# Patient Record
Sex: FEMALE | Race: OTHER | HISPANIC OR LATINO
[De-identification: names, ages, dates, MRNs, and addresses within clinical notes are randomized per-mention and may not be internally consistent; named-entity substitution may affect disease eponyms.]

---

## 2017-12-06 PROBLEM — Z00.00 ENCOUNTER FOR PREVENTIVE HEALTH EXAMINATION: Status: ACTIVE | Noted: 2017-12-06

## 2017-12-08 ENCOUNTER — APPOINTMENT (OUTPATIENT)
Dept: VASCULAR SURGERY | Facility: CLINIC | Age: 67
End: 2017-12-08
Payer: MEDICARE

## 2017-12-08 VITALS
TEMPERATURE: 97.8 F | DIASTOLIC BLOOD PRESSURE: 104 MMHG | WEIGHT: 135 LBS | SYSTOLIC BLOOD PRESSURE: 176 MMHG | HEIGHT: 64 IN | HEART RATE: 91 BPM | BODY MASS INDEX: 23.05 KG/M2

## 2017-12-08 DIAGNOSIS — Z86.79 PERSONAL HISTORY OF OTHER DISEASES OF THE CIRCULATORY SYSTEM: ICD-10-CM

## 2017-12-08 DIAGNOSIS — Z82.49 FAMILY HISTORY OF ISCHEMIC HEART DISEASE AND OTHER DISEASES OF THE CIRCULATORY SYSTEM: ICD-10-CM

## 2017-12-08 PROCEDURE — 99204 OFFICE O/P NEW MOD 45 MIN: CPT | Mod: 25

## 2017-12-08 PROCEDURE — 93970 EXTREMITY STUDY: CPT

## 2018-01-17 ENCOUNTER — APPOINTMENT (OUTPATIENT)
Dept: VASCULAR SURGERY | Facility: CLINIC | Age: 68
End: 2018-01-17
Payer: MEDICARE

## 2018-01-17 PROCEDURE — 37765 STAB PHLEB VEINS XTR 10-20: CPT

## 2018-01-17 PROCEDURE — 36478 ENDOVENOUS LASER 1ST VEIN: CPT

## 2018-01-19 ENCOUNTER — APPOINTMENT (OUTPATIENT)
Dept: VASCULAR SURGERY | Facility: CLINIC | Age: 68
End: 2018-01-19
Payer: MEDICARE

## 2018-01-19 PROCEDURE — 93971 EXTREMITY STUDY: CPT

## 2018-01-25 ENCOUNTER — TRANSCRIPTION ENCOUNTER (OUTPATIENT)
Age: 68
End: 2018-01-25

## 2018-02-02 ENCOUNTER — APPOINTMENT (OUTPATIENT)
Dept: VASCULAR SURGERY | Facility: CLINIC | Age: 68
End: 2018-02-02
Payer: MEDICARE

## 2018-02-02 VITALS
SYSTOLIC BLOOD PRESSURE: 142 MMHG | HEART RATE: 88 BPM | HEIGHT: 64 IN | TEMPERATURE: 98.6 F | WEIGHT: 135 LBS | DIASTOLIC BLOOD PRESSURE: 80 MMHG | BODY MASS INDEX: 23.05 KG/M2

## 2018-02-02 DIAGNOSIS — I83.899 VARICOSE VEINS OF UNSPECIFIED LOWER EXTREMITY WITH OTHER COMPLICATIONS: ICD-10-CM

## 2018-02-02 PROCEDURE — 99024 POSTOP FOLLOW-UP VISIT: CPT

## 2018-02-13 PROBLEM — I83.899 BLEEDING FROM VARICOSE VEIN: Status: ACTIVE | Noted: 2017-12-08

## 2018-10-11 ENCOUNTER — APPOINTMENT (OUTPATIENT)
Dept: WOUND CARE | Facility: CLINIC | Age: 68
End: 2018-10-11
Payer: MEDICARE

## 2018-10-11 PROCEDURE — 99214 OFFICE O/P EST MOD 30 MIN: CPT

## 2018-10-11 PROCEDURE — 99204 OFFICE O/P NEW MOD 45 MIN: CPT

## 2019-12-04 ENCOUNTER — EMERGENCY (EMERGENCY)
Facility: HOSPITAL | Age: 69
LOS: 1 days | Discharge: HOME | End: 2019-12-04
Attending: EMERGENCY MEDICINE
Payer: COMMERCIAL

## 2019-12-04 VITALS
HEART RATE: 64 BPM | OXYGEN SATURATION: 99 % | DIASTOLIC BLOOD PRESSURE: 119 MMHG | SYSTOLIC BLOOD PRESSURE: 178 MMHG | RESPIRATION RATE: 17 BRPM

## 2019-12-04 VITALS
HEIGHT: 61 IN | SYSTOLIC BLOOD PRESSURE: 199 MMHG | TEMPERATURE: 98 F | DIASTOLIC BLOOD PRESSURE: 118 MMHG | WEIGHT: 138.01 LBS | OXYGEN SATURATION: 100 % | HEART RATE: 97 BPM | RESPIRATION RATE: 18 BRPM

## 2019-12-04 PROCEDURE — 73060 X-RAY EXAM OF HUMERUS: CPT | Mod: 26,LT

## 2019-12-04 PROCEDURE — 73030 X-RAY EXAM OF SHOULDER: CPT | Mod: 26,LT

## 2019-12-04 PROCEDURE — 73502 X-RAY EXAM HIP UNI 2-3 VIEWS: CPT

## 2019-12-04 PROCEDURE — 99284 EMERGENCY DEPT VISIT MOD MDM: CPT

## 2019-12-04 PROCEDURE — 73020 X-RAY EXAM OF SHOULDER: CPT

## 2019-12-04 PROCEDURE — 73502 X-RAY EXAM HIP UNI 2-3 VIEWS: CPT | Mod: 26,LT

## 2019-12-04 PROCEDURE — 73060 X-RAY EXAM OF HUMERUS: CPT

## 2019-12-04 PROCEDURE — 73030 X-RAY EXAM OF SHOULDER: CPT

## 2019-12-04 NOTE — ED PROVIDER NOTE - CARE PLAN
Principal Discharge DX:	Closed fracture of proximal end of left humerus, unspecified fracture morphology, initial encounter

## 2019-12-04 NOTE — ED ADULT NURSE REASSESSMENT NOTE - NS ED NURSE REASSESS COMMENT FT1
Patient A&Ox3, ambulatory. Sling applied to left upper extremity, patient instructed on how to wear and apply sling. D/C instructions reviewed with patient by RN. Patient verbalized understanding. /119 at this time. MD Sifuentes states ok for discharge. Patient states that she took her BP meds this morning as prescribed. Family member at bedside.

## 2019-12-04 NOTE — ED PROVIDER NOTE - PATIENT PORTAL LINK FT
You can access the FollowMyHealth Patient Portal offered by [facility] by registering at the following website: [email or web address]. By joining [employer]’s FollowMyHealth portal, you will also be able to view your health information using other applications (apps) compatible with our system.

## 2019-12-04 NOTE — ED PROVIDER NOTE - CARE PROVIDER_API CALL
Boo Masters)  Orthopaedic Surgery  [address] [place] [address], [address]  [place] [address]  Phone: [phone]  Fax: [phone]  Follow Up Time:

## 2019-12-04 NOTE — ED PROVIDER NOTE - CLINICAL SUMMARY MEDICAL DECISION MAKING FREE TEXT BOX
Attn - pt with fall approx 10 days ago with left shoulder pain and ecchymosis and left hip pain.  Xrays to r/o fx.

## 2019-12-04 NOTE — ED PROVIDER NOTE - PHYSICAL EXAMINATION
Attn - pt with elevated BP.  alert, mild pain.  head - NC/AT, neck-, chest - sl. pain left lateral chest without focal pain or ecchymosis or crepitation. sl. tender left paraLumbar.  no s/p tenderness,  no ecchymosis.  no CVAT.  Pelvis-, Right upper and lower ext - neg.  Left shoulder - no clavicle or AC, prox hum sl. tender with ecchymosis, no swelling or deformity.  decreased AROM FF and ABduction.  able to IR and ER shoulder.  Left hip - no tender, pain with SLR in Left groin.  no ecchymosis.  neuro - nonfocal.

## 2019-12-04 NOTE — ED PROVIDER NOTE - OBJECTIVE STATEMENT
Attn - pt seen in FT5 - pt fell approx 10 days ago when going down basement stairs, missed last step and fell onto Left side.  Pt c/o pain left shoulder and left hip.  decreased AROM.  NO: head injury, neck or spine pain. abdo pain, dizziness, n/v, LOC.  c/o mild pain left lateral ribs and left lower back.  + bruising left arm.

## 2019-12-04 NOTE — ED PROVIDER NOTE - NSFOLLOWUPINSTRUCTIONS_ED_ALL_ED_FT
Tylenol two tablets every 4-6 hours as needed or Motrin every 6-8 hours.  Sling for left shoulder until discontinued by Orthopedist  Call Orthopedist - Dr. Boo Masters today for appointment in the next week.

## 2021-12-16 PROBLEM — I10 ESSENTIAL (PRIMARY) HYPERTENSION: Chronic | Status: ACTIVE | Noted: 2019-12-04

## 2021-12-22 ENCOUNTER — APPOINTMENT (OUTPATIENT)
Dept: CARE COORDINATION | Facility: HOME HEALTH | Age: 71
End: 2021-12-22

## 2021-12-30 ENCOUNTER — APPOINTMENT (OUTPATIENT)
Dept: CARE COORDINATION | Facility: HOME HEALTH | Age: 71
End: 2021-12-30
Payer: MEDICARE

## 2021-12-30 DIAGNOSIS — I83.93 ASYMPTOMATIC VARICOSE VEINS OF BILATERAL LOWER EXTREMITIES: ICD-10-CM

## 2021-12-30 DIAGNOSIS — Z00.00 ENCOUNTER FOR GENERAL ADULT MEDICAL EXAMINATION W/OUT ABNORMAL FINDINGS: ICD-10-CM

## 2021-12-30 DIAGNOSIS — Z78.9 OTHER SPECIFIED HEALTH STATUS: ICD-10-CM

## 2021-12-30 PROCEDURE — 99348 HOME/RES VST EST LOW MDM 30: CPT | Mod: 95

## 2021-12-30 NOTE — ASSESSMENT
[FreeTextEntry1] : 71 year old female with history of HTN, varicose veins [facility].  During the TeleHealth the patient was in no acute distress.  Able to speak with complete sentences and no audible wheeze noted.  The patient denies chest pain, shortness of breath, cough, hemoptysis, fever, palpitations, syncope.\par \par HTN: stable\par Continue Diltiazem\par Follow heart healthy diet\par Follow up with PCP in 2022\par

## 2021-12-30 NOTE — HISTORY OF PRESENT ILLNESS
[Home] : at home, [unfilled] , at the time of the visit. [Other Location: e.g. Home (Enter Location, City,State)___] : at [unfilled] [Spouse] : spouse [Family Member] : family member [Other:____] : [unfilled] [Verbal consent obtained from patient] : the patient, [unfilled] [de-identified] : This patient is Enrolled in the [facility] First Follow Up Program through [employer]\par \par 71 year old female with history of HTN, varicose veins scheduled for [facility] wellness follow up today.  During the TeleHealth the patient was in no acute distress.  Able to speak with complete sentences and no audible wheeze noted.  The patient denies chest pain, shortness of breath, cough, hemoptysis, fever, palpitations, syncope.  Pt admits to not taking her Diltiazem on a daily basis.\par \par \par COVID Vaccine Moderna 2nd dose 3/15/2021\par Lives with spouse\par Uses assistive device\par \par PCP last appointment  1 year ago next scheduled appointment 2022\par \par

## 2021-12-30 NOTE — PHYSICAL EXAM
[No Acute Distress] : no acute distress [Well Nourished] : well nourished [Well Developed] : well developed [Normal Sclera/Conjunctiva] : normal sclera/conjunctiva [Normal Outer Ear/Nose] : the outer ears and nose were normal in appearance [No JVD] : no jugular venous distention [No Respiratory Distress] : no respiratory distress  [No Accessory Muscle Use] : no accessory muscle use [No Edema] : there was no peripheral edema [Non Tender] : non-tender [de-identified] : Limited Visual Physical Exam during TeleHealth Visit [de-identified] : Awake and alert [de-identified] : Calm

## 2021-12-30 NOTE — PLAN
[FreeTextEntry1] : CV and pulmonary status stable\par Patient advised to continue with medication regimen as directed \par Medication education discussed in full detail with + teach back. \par Encouraged verbalization of fears and concerns. \par Report all symptoms not relieved by rest or medication\par Educated on monitoring blood pressure\par Maintain Balanced diet \par Exercise as appropriate\par Patient educated on s/s of when to call medical providers with + teach back. \par Reminded of NP role and advised to call with any questions/concerns. \par Follow up with medical providers as scheduled\par Instructed the patient to call TCM Team or CCC with any questions or concerns.\par

## 2024-02-12 ENCOUNTER — INPATIENT (INPATIENT)
Facility: HOSPITAL | Age: 74
LOS: 7 days | Discharge: HOME HEALTH | DRG: 640 | End: 2024-02-20
Attending: STUDENT IN AN ORGANIZED HEALTH CARE EDUCATION/TRAINING PROGRAM | Admitting: HOSPITALIST
Payer: COMMERCIAL

## 2024-02-12 VITALS
OXYGEN SATURATION: 99 % | RESPIRATION RATE: 17 BRPM | HEART RATE: 118 BPM | DIASTOLIC BLOOD PRESSURE: 52 MMHG | TEMPERATURE: 98 F | SYSTOLIC BLOOD PRESSURE: 82 MMHG

## 2024-02-12 LAB
ALBUMIN SERPL ELPH-MCNC: 2.9 G/DL — LOW (ref 3.3–5)
ALP SERPL-CCNC: 92 U/L — SIGNIFICANT CHANGE UP (ref 40–120)
ALT FLD-CCNC: 234 U/L — HIGH (ref 10–45)
ANION GAP SERPL CALC-SCNC: 20 MMOL/L — HIGH (ref 5–17)
AST SERPL-CCNC: 206 U/L — HIGH (ref 10–40)
BASOPHILS # BLD AUTO: 0 K/UL — SIGNIFICANT CHANGE UP (ref 0–0.2)
BASOPHILS NFR BLD AUTO: 0 % — SIGNIFICANT CHANGE UP (ref 0–2)
BILIRUB SERPL-MCNC: 0.7 MG/DL — SIGNIFICANT CHANGE UP (ref 0.2–1.2)
BLD GP AB SCN SERPL QL: NEGATIVE — SIGNIFICANT CHANGE UP
BUN SERPL-MCNC: 34 MG/DL — HIGH (ref 7–23)
CALCIUM SERPL-MCNC: 6.4 MG/DL — CRITICAL LOW (ref 8.4–10.5)
CHLORIDE SERPL-SCNC: 92 MMOL/L — LOW (ref 96–108)
CO2 SERPL-SCNC: 22 MMOL/L — SIGNIFICANT CHANGE UP (ref 22–31)
CREAT SERPL-MCNC: 1.11 MG/DL — SIGNIFICANT CHANGE UP (ref 0.5–1.3)
EGFR: 52 ML/MIN/1.73M2 — LOW
EOSINOPHIL # BLD AUTO: 0 K/UL — SIGNIFICANT CHANGE UP (ref 0–0.5)
EOSINOPHIL NFR BLD AUTO: 0 % — SIGNIFICANT CHANGE UP (ref 0–6)
GLUCOSE SERPL-MCNC: 115 MG/DL — HIGH (ref 70–99)
HCT VFR BLD CALC: 18.3 % — CRITICAL LOW (ref 34.5–45)
HGB BLD-MCNC: 6 G/DL — CRITICAL LOW (ref 11.5–15.5)
LYMPHOCYTES # BLD AUTO: 0.19 K/UL — LOW (ref 1–3.3)
LYMPHOCYTES # BLD AUTO: 16 % — SIGNIFICANT CHANGE UP (ref 13–44)
MAGNESIUM SERPL-MCNC: 1.1 MG/DL — LOW (ref 1.6–2.6)
MCHC RBC-ENTMCNC: 31.1 PG — SIGNIFICANT CHANGE UP (ref 27–34)
MCHC RBC-ENTMCNC: 32.8 GM/DL — SIGNIFICANT CHANGE UP (ref 32–36)
MCV RBC AUTO: 94.8 FL — SIGNIFICANT CHANGE UP (ref 80–100)
METAMYELOCYTES # FLD: 4 % — HIGH (ref 0–0)
MONOCYTES # BLD AUTO: 0.14 K/UL — SIGNIFICANT CHANGE UP (ref 0–0.9)
MONOCYTES NFR BLD AUTO: 12 % — SIGNIFICANT CHANGE UP (ref 2–14)
MYELOCYTES NFR BLD: 2 % — HIGH (ref 0–0)
NEUTROPHILS # BLD AUTO: 0.77 K/UL — LOW (ref 1.8–7.4)
NEUTROPHILS NFR BLD AUTO: 48 % — SIGNIFICANT CHANGE UP (ref 43–77)
NEUTS BAND # BLD: 18 % — HIGH (ref 0–8)
NRBC # BLD: 4 /100 WBCS — HIGH (ref 0–0)
PHOSPHATE SERPL-MCNC: 2.1 MG/DL — LOW (ref 2.5–4.5)
PLAT MORPH BLD: NORMAL — SIGNIFICANT CHANGE UP
PLATELET # BLD AUTO: 68 K/UL — LOW (ref 150–400)
POTASSIUM SERPL-MCNC: 3.2 MMOL/L — LOW (ref 3.5–5.3)
POTASSIUM SERPL-SCNC: 3.2 MMOL/L — LOW (ref 3.5–5.3)
PROT SERPL-MCNC: 6.6 G/DL — SIGNIFICANT CHANGE UP (ref 6–8.3)
RBC # BLD: 1.93 M/UL — LOW (ref 3.8–5.2)
RBC # FLD: 14.5 % — SIGNIFICANT CHANGE UP (ref 10.3–14.5)
RBC BLD AUTO: SIGNIFICANT CHANGE UP
RH IG SCN BLD-IMP: POSITIVE — SIGNIFICANT CHANGE UP
SODIUM SERPL-SCNC: 134 MMOL/L — LOW (ref 135–145)
TROPONIN T, HIGH SENSITIVITY RESULT: 67 NG/L — HIGH (ref 0–51)
WBC # BLD: 1.16 K/UL — LOW (ref 3.8–10.5)
WBC # FLD AUTO: 1.16 K/UL — LOW (ref 3.8–10.5)

## 2024-02-12 PROCEDURE — 72170 X-RAY EXAM OF PELVIS: CPT | Mod: 26

## 2024-02-12 PROCEDURE — 99285 EMERGENCY DEPT VISIT HI MDM: CPT

## 2024-02-12 PROCEDURE — 70450 CT HEAD/BRAIN W/O DYE: CPT | Mod: 26,MA

## 2024-02-12 PROCEDURE — 71045 X-RAY EXAM CHEST 1 VIEW: CPT | Mod: 26

## 2024-02-12 PROCEDURE — 72125 CT NECK SPINE W/O DYE: CPT | Mod: 26,MA

## 2024-02-12 RX ORDER — ONDANSETRON 8 MG/1
4 TABLET, FILM COATED ORAL ONCE
Refills: 0 | Status: COMPLETED | OUTPATIENT
Start: 2024-02-12 | End: 2024-02-12

## 2024-02-12 RX ORDER — SODIUM CHLORIDE 9 MG/ML
1000 INJECTION INTRAMUSCULAR; INTRAVENOUS; SUBCUTANEOUS ONCE
Refills: 0 | Status: COMPLETED | OUTPATIENT
Start: 2024-02-12 | End: 2024-02-12

## 2024-02-12 RX ORDER — MAGNESIUM SULFATE 500 MG/ML
2 VIAL (ML) INJECTION ONCE
Refills: 0 | Status: COMPLETED | OUTPATIENT
Start: 2024-02-12 | End: 2024-02-12

## 2024-02-12 RX ORDER — POTASSIUM CHLORIDE 20 MEQ
40 PACKET (EA) ORAL ONCE
Refills: 0 | Status: COMPLETED | OUTPATIENT
Start: 2024-02-12 | End: 2024-02-12

## 2024-02-12 RX ORDER — MAGNESIUM OXIDE 400 MG ORAL TABLET 241.3 MG
400 TABLET ORAL ONCE
Refills: 0 | Status: COMPLETED | OUTPATIENT
Start: 2024-02-12 | End: 2024-02-12

## 2024-02-12 RX ORDER — CALCIUM GLUCONATE 100 MG/ML
1 VIAL (ML) INTRAVENOUS ONCE
Refills: 0 | Status: COMPLETED | OUTPATIENT
Start: 2024-02-12 | End: 2024-02-12

## 2024-02-12 RX ADMIN — ONDANSETRON 4 MILLIGRAM(S): 8 TABLET, FILM COATED ORAL at 18:25

## 2024-02-12 RX ADMIN — Medication 40 MILLIEQUIVALENT(S): at 22:09

## 2024-02-12 RX ADMIN — Medication 25 GRAM(S): at 22:06

## 2024-02-12 RX ADMIN — MAGNESIUM OXIDE 400 MG ORAL TABLET 400 MILLIGRAM(S): 241.3 TABLET ORAL at 22:17

## 2024-02-12 RX ADMIN — SODIUM CHLORIDE 1000 MILLILITER(S): 9 INJECTION INTRAMUSCULAR; INTRAVENOUS; SUBCUTANEOUS at 18:25

## 2024-02-12 NOTE — ED ADULT NURSE NOTE - NSFALLHARMRISKINTERV_ED_ALL_ED

## 2024-02-12 NOTE — ED ADULT NURSE NOTE - NSICDXPASTMEDICALHX_GEN_ALL_CORE_FT
PAST MEDICAL HISTORY:  At risk for infection due to chemotherapy     Breast cancer     Carcinoma of breast treated with adjuvant chemotherapy     Essential hypertension

## 2024-02-12 NOTE — ED PROVIDER NOTE - CLINICAL SUMMARY MEDICAL DECISION MAKING FREE TEXT BOX
73-year-old female with history of breast cancer.  Diagnosed in October status post Jf presents to the ED with 1 week of weakness.  Patient endorses decreased appetite, patient says she wants to drink water to digest her food.  Patient also endorses that she fell last night and not sure what happened.  Ddx includes electrolyte imbalance, failure to thrive, ich. Orders include chest xray, ct head, c spine, labs. Dispo pending labs, imaging and reassessment.

## 2024-02-12 NOTE — ED PROVIDER NOTE - OBJECTIVE STATEMENT
73-year-old female with history of breast cancer.  Diagnosed in October status post Jf presents to the ED with 1 week of weakness.  Patient endorses decreased appetite, patient says she wants to drink water to digest her food.  Patient also endorses that she fell last night and not sure what happened.

## 2024-02-12 NOTE — ED PROVIDER NOTE - PROGRESS NOTE DETAILS
Parth Villa MD PGY-2: Discussed with on call [facility] oncologist Gali Reyes who agrees with blood transfusion and worlup. Recommends hemolysis workup as well but likely presentation 2/2 teddy from chemo.

## 2024-02-12 NOTE — ED PROVIDER NOTE - ATTENDING CONTRIBUTION TO CARE
I performed a face to face history and physical exam of the patient and discussed their management with the resident. I reviewed the resident's note and agree with the documented findings and plan of care.       74 y/o female with hx of R breast CA followed by [facility] last chemo two weeks ago p/w possible syncopal episode, unwitnessed, pt recalls feeling lightheaded but does not remember fall, pt complains of generalized weakness, has small laceration to R eyebrow, pt had diarrhea two days ago, multiple episodes nonbloody, no N/V but decreased PO, no abdominal pain, no CP, no SOB, no fever, no chills, no leg pain, no leg swelling,     Vitals reviewed, General thin female in NAD, Head: + 1/2cm approximated laceration R eyebrow,  PERRLA, EOMI, dry mucous membranes, Neck: no midline tenderness, FROM, , Chest: clear to ausculation, Cardiac: regular rate and rhythm, Abdomen: soft and nontender, Back no midline tenderness, pelvis stable, nontender, Extremities: well perfused and without edema, Skin: no visible rash, Psych: appropriate mood, Neuro: AxOx3, no focal neurologic deficit     Ddx includes but not limited to syncope, head injury, metabolic derangement, dehydration, ACS, PE, cardiac arrhythymia    Will get CBC, CMP, CE, Ct head/neck, EKG, tele,   Consider CTA

## 2024-02-12 NOTE — ED ADULT NURSE NOTE - SUICIDE SCREENING QUESTION 3
The patient states that for 6 months she has had bone pain in the right mid tibia without trauma or injury.  Exam is normal.  Check x-ray.   No

## 2024-02-12 NOTE — ED ADULT NURSE NOTE - OBJECTIVE STATEMENT
1739 pt 73yf aox4 w/  at bedside able to verbalize concerns, dx bl breast cancer on chemo per  at 9:30am pt was found on the floor when he went out to get groceries found pt on floor, called her hemo onc and was recommended to go to hospital for eval, pt states no appetite x 2 days, unable to tolerate fluids and solid foods, states feels weak at this time/pending eval

## 2024-02-13 ENCOUNTER — RESULT REVIEW (OUTPATIENT)
Age: 74
End: 2024-02-13

## 2024-02-13 DIAGNOSIS — D61.818 OTHER PANCYTOPENIA: ICD-10-CM

## 2024-02-13 DIAGNOSIS — R71.0 PRECIPITOUS DROP IN HEMATOCRIT: ICD-10-CM

## 2024-02-13 DIAGNOSIS — E87.8 OTHER DISORDERS OF ELECTROLYTE AND FLUID BALANCE, NOT ELSEWHERE CLASSIFIED: ICD-10-CM

## 2024-02-13 DIAGNOSIS — C50.919 MALIGNANT NEOPLASM OF UNSPECIFIED SITE OF UNSPECIFIED FEMALE BREAST: ICD-10-CM

## 2024-02-13 DIAGNOSIS — I10 ESSENTIAL (PRIMARY) HYPERTENSION: ICD-10-CM

## 2024-02-13 DIAGNOSIS — R55 SYNCOPE AND COLLAPSE: ICD-10-CM

## 2024-02-13 LAB
A1C WITH ESTIMATED AVERAGE GLUCOSE RESULT: 5.6 % — SIGNIFICANT CHANGE UP (ref 4–5.6)
ALBUMIN SERPL ELPH-MCNC: 2.7 G/DL — LOW (ref 3.3–5)
ALP SERPL-CCNC: 82 U/L — SIGNIFICANT CHANGE UP (ref 40–120)
ALT FLD-CCNC: 173 U/L — HIGH (ref 10–45)
ANION GAP SERPL CALC-SCNC: 18 MMOL/L — HIGH (ref 5–17)
APPEARANCE UR: CLEAR — SIGNIFICANT CHANGE UP
APTT BLD: 17.7 SEC — LOW (ref 24.5–35.6)
AST SERPL-CCNC: 116 U/L — HIGH (ref 10–40)
B-OH-BUTYR SERPL-SCNC: 2.8 MMOL/L — HIGH
BACTERIA # UR AUTO: NEGATIVE /HPF — SIGNIFICANT CHANGE UP
BASE EXCESS BLDV CALC-SCNC: 1.7 MMOL/L — SIGNIFICANT CHANGE UP (ref -2–3)
BASOPHILS # BLD AUTO: 0 K/UL — SIGNIFICANT CHANGE UP (ref 0–0.2)
BASOPHILS NFR BLD AUTO: 0 % — SIGNIFICANT CHANGE UP (ref 0–2)
BILIRUB SERPL-MCNC: 0.7 MG/DL — SIGNIFICANT CHANGE UP (ref 0.2–1.2)
BILIRUB UR-MCNC: NEGATIVE — SIGNIFICANT CHANGE UP
BUN SERPL-MCNC: 36 MG/DL — HIGH (ref 7–23)
CA-I SERPL-SCNC: 0.89 MMOL/L — LOW (ref 1.15–1.33)
CALCIUM SERPL-MCNC: 6.6 MG/DL — LOW (ref 8.4–10.5)
CAST: 1 /LPF — SIGNIFICANT CHANGE UP (ref 0–4)
CHLORIDE BLDV-SCNC: 101 MMOL/L — SIGNIFICANT CHANGE UP (ref 96–108)
CHLORIDE SERPL-SCNC: 97 MMOL/L — SIGNIFICANT CHANGE UP (ref 96–108)
CHOLEST SERPL-MCNC: 116 MG/DL — SIGNIFICANT CHANGE UP
CLOSURE TME COLL+EPINEP BLD: 59 K/UL — LOW (ref 150–400)
CO2 BLDV-SCNC: 27 MMOL/L — HIGH (ref 22–26)
CO2 SERPL-SCNC: 24 MMOL/L — SIGNIFICANT CHANGE UP (ref 22–31)
COLOR SPEC: YELLOW — SIGNIFICANT CHANGE UP
CREAT ?TM UR-MCNC: 54 MG/DL — SIGNIFICANT CHANGE UP
CREAT SERPL-MCNC: 0.76 MG/DL — SIGNIFICANT CHANGE UP (ref 0.5–1.3)
DIFF PNL FLD: ABNORMAL
EGFR: 82 ML/MIN/1.73M2 — SIGNIFICANT CHANGE UP
EOSINOPHIL # BLD AUTO: 0.01 K/UL — SIGNIFICANT CHANGE UP (ref 0–0.5)
EOSINOPHIL NFR BLD AUTO: 0.6 % — SIGNIFICANT CHANGE UP (ref 0–6)
ESTIMATED AVERAGE GLUCOSE: 114 MG/DL — SIGNIFICANT CHANGE UP (ref 68–114)
FERRITIN SERPL-MCNC: 4212 NG/ML — HIGH (ref 13–330)
FLUAV AG NPH QL: SIGNIFICANT CHANGE UP
FLUBV AG NPH QL: SIGNIFICANT CHANGE UP
FOLATE SERPL-MCNC: 10.7 NG/ML — SIGNIFICANT CHANGE UP
GAS PNL BLDV: 135 MMOL/L — LOW (ref 136–145)
GAS PNL BLDV: SIGNIFICANT CHANGE UP
GAS PNL BLDV: SIGNIFICANT CHANGE UP
GLUCOSE BLDV-MCNC: 77 MG/DL — SIGNIFICANT CHANGE UP (ref 70–99)
GLUCOSE SERPL-MCNC: 80 MG/DL — SIGNIFICANT CHANGE UP (ref 70–99)
GLUCOSE UR QL: NEGATIVE MG/DL — SIGNIFICANT CHANGE UP
HAPTOGLOB SERPL-MCNC: 562 MG/DL — HIGH (ref 34–200)
HAPTOGLOB SERPL-MCNC: 568 MG/DL — HIGH (ref 34–200)
HAV IGM SER-ACNC: SIGNIFICANT CHANGE UP
HBV CORE IGM SER-ACNC: SIGNIFICANT CHANGE UP
HBV SURFACE AG SER-ACNC: SIGNIFICANT CHANGE UP
HCO3 BLDV-SCNC: 26 MMOL/L — SIGNIFICANT CHANGE UP (ref 22–29)
HCT VFR BLD CALC: 20.9 % — CRITICAL LOW (ref 34.5–45)
HCT VFR BLD CALC: 30.7 % — LOW (ref 34.5–45)
HCT VFR BLDA CALC: 24 % — LOW (ref 34.5–46.5)
HCV AB S/CO SERPL IA: 0.04 S/CO — SIGNIFICANT CHANGE UP (ref 0–0.99)
HCV AB SERPL-IMP: SIGNIFICANT CHANGE UP
HDLC SERPL-MCNC: 19 MG/DL — LOW
HGB BLD CALC-MCNC: 7.9 G/DL — LOW (ref 11.7–16.1)
HGB BLD-MCNC: 10.4 G/DL — LOW (ref 11.5–15.5)
HGB BLD-MCNC: 7.1 G/DL — LOW (ref 11.5–15.5)
IMM GRANULOCYTES NFR BLD AUTO: 1.9 % — HIGH (ref 0–0.9)
INR BLD: 1.41 RATIO — HIGH (ref 0.85–1.18)
IRON SATN MFR SERPL: 32 % — SIGNIFICANT CHANGE UP (ref 14–50)
IRON SATN MFR SERPL: 34 % — SIGNIFICANT CHANGE UP (ref 14–50)
IRON SATN MFR SERPL: 44 UG/DL — SIGNIFICANT CHANGE UP (ref 30–160)
IRON SATN MFR SERPL: 57 UG/DL — SIGNIFICANT CHANGE UP (ref 30–160)
KETONES UR-MCNC: 40 MG/DL
LACTATE BLDV-MCNC: 1.1 MMOL/L — SIGNIFICANT CHANGE UP (ref 0.5–2)
LACTATE SERPL-SCNC: 1.1 MMOL/L — SIGNIFICANT CHANGE UP (ref 0.5–2)
LDH SERPL L TO P-CCNC: 131 U/L — SIGNIFICANT CHANGE UP (ref 50–242)
LDH SERPL L TO P-CCNC: 138 U/L — SIGNIFICANT CHANGE UP (ref 50–242)
LEUKOCYTE ESTERASE UR-ACNC: ABNORMAL
LIPID PNL WITH DIRECT LDL SERPL: 75 MG/DL — SIGNIFICANT CHANGE UP
LYMPHOCYTES # BLD AUTO: 0.02 K/UL — LOW (ref 1–3.3)
LYMPHOCYTES # BLD AUTO: 1.3 % — LOW (ref 13–44)
MAGNESIUM SERPL-MCNC: 1.7 MG/DL — SIGNIFICANT CHANGE UP (ref 1.6–2.6)
MCHC RBC-ENTMCNC: 30.5 PG — SIGNIFICANT CHANGE UP (ref 27–34)
MCHC RBC-ENTMCNC: 30.6 PG — SIGNIFICANT CHANGE UP (ref 27–34)
MCHC RBC-ENTMCNC: 33.9 GM/DL — SIGNIFICANT CHANGE UP (ref 32–36)
MCHC RBC-ENTMCNC: 34 GM/DL — SIGNIFICANT CHANGE UP (ref 32–36)
MCV RBC AUTO: 90 FL — SIGNIFICANT CHANGE UP (ref 80–100)
MCV RBC AUTO: 90.1 FL — SIGNIFICANT CHANGE UP (ref 80–100)
MONOCYTES # BLD AUTO: 0.3 K/UL — SIGNIFICANT CHANGE UP (ref 0–0.9)
MONOCYTES NFR BLD AUTO: 19.2 % — HIGH (ref 2–14)
NEUTROPHILS # BLD AUTO: 1.2 K/UL — LOW (ref 1.8–7.4)
NEUTROPHILS NFR BLD AUTO: 77 % — SIGNIFICANT CHANGE UP (ref 43–77)
NITRITE UR-MCNC: NEGATIVE — SIGNIFICANT CHANGE UP
NON HDL CHOLESTEROL: 97 MG/DL — SIGNIFICANT CHANGE UP
NRBC # BLD: 1 /100 WBCS — HIGH (ref 0–0)
NRBC # BLD: 2 /100 WBCS — HIGH (ref 0–0)
OSMOLALITY UR: 552 MOS/KG — SIGNIFICANT CHANGE UP (ref 300–900)
PCO2 BLDV: 36 MMHG — LOW (ref 39–42)
PH BLDV: 7.46 — HIGH (ref 7.32–7.43)
PH UR: 6.5 — SIGNIFICANT CHANGE UP (ref 5–8)
PHOSPHATE SERPL-MCNC: 1.7 MG/DL — LOW (ref 2.5–4.5)
PLATELET # BLD AUTO: 33 K/UL — LOW (ref 150–400)
PLATELET # BLD AUTO: 49 K/UL — LOW (ref 150–400)
PO2 BLDV: 89 MMHG — HIGH (ref 25–45)
POTASSIUM BLDV-SCNC: 3.1 MMOL/L — LOW (ref 3.5–5.1)
POTASSIUM SERPL-MCNC: 3.3 MMOL/L — LOW (ref 3.5–5.3)
POTASSIUM SERPL-SCNC: 3.3 MMOL/L — LOW (ref 3.5–5.3)
POTASSIUM UR-SCNC: 35 MMOL/L — SIGNIFICANT CHANGE UP
PROT ?TM UR-MCNC: 58 MG/DL — HIGH (ref 0–12)
PROT SERPL-MCNC: 5.8 G/DL — LOW (ref 6–8.3)
PROT UR-MCNC: 30 MG/DL
PROT/CREAT UR-RTO: 1.1 RATIO — HIGH (ref 0–0.2)
PROTHROM AB SERPL-ACNC: 14.7 SEC — HIGH (ref 9.5–13)
RBC # BLD: 2.32 M/UL — LOW (ref 3.8–5.2)
RBC # BLD: 2.32 M/UL — LOW (ref 3.8–5.2)
RBC # BLD: 3.41 M/UL — LOW (ref 3.8–5.2)
RBC # FLD: 15.6 % — HIGH (ref 10.3–14.5)
RBC # FLD: 15.7 % — HIGH (ref 10.3–14.5)
RBC CASTS # UR COMP ASSIST: 2 /HPF — SIGNIFICANT CHANGE UP (ref 0–4)
RETICS #: 19 K/UL — LOW (ref 25–125)
RETICS/RBC NFR: 0.8 % — SIGNIFICANT CHANGE UP (ref 0.5–2.5)
REVIEW: SIGNIFICANT CHANGE UP
RSV RNA NPH QL NAA+NON-PROBE: SIGNIFICANT CHANGE UP
SAO2 % BLDV: 99.6 % — HIGH (ref 67–88)
SARS-COV-2 RNA SPEC QL NAA+PROBE: SIGNIFICANT CHANGE UP
SODIUM SERPL-SCNC: 139 MMOL/L — SIGNIFICANT CHANGE UP (ref 135–145)
SODIUM UR-SCNC: 91 MMOL/L — SIGNIFICANT CHANGE UP
SP GR SPEC: 1.02 — SIGNIFICANT CHANGE UP (ref 1–1.03)
SQUAMOUS # UR AUTO: 2 /HPF — SIGNIFICANT CHANGE UP (ref 0–5)
TIBC SERPL-MCNC: 127 UG/DL — LOW (ref 220–430)
TIBC SERPL-MCNC: 181 UG/DL — LOW (ref 220–430)
TRIGL SERPL-MCNC: 118 MG/DL — SIGNIFICANT CHANGE UP
TSH SERPL-MCNC: 0.55 UIU/ML — SIGNIFICANT CHANGE UP (ref 0.27–4.2)
UIBC SERPL-MCNC: 124 UG/DL — SIGNIFICANT CHANGE UP (ref 110–370)
UIBC SERPL-MCNC: 84 UG/DL — LOW (ref 110–370)
URATE SERPL-MCNC: 4.2 MG/DL — SIGNIFICANT CHANGE UP (ref 2.5–7)
UROBILINOGEN FLD QL: 4 MG/DL (ref 0.2–1)
VIT B12 SERPL-MCNC: 1519 PG/ML — HIGH (ref 232–1245)
VIT B12 SERPL-MCNC: 1543 PG/ML — HIGH (ref 232–1245)
WBC # BLD: 1.56 K/UL — LOW (ref 3.8–10.5)
WBC # BLD: 3.35 K/UL — LOW (ref 3.8–10.5)
WBC # FLD AUTO: 1.56 K/UL — LOW (ref 3.8–10.5)
WBC # FLD AUTO: 3.35 K/UL — LOW (ref 3.8–10.5)
WBC UR QL: 27 /HPF — HIGH (ref 0–5)

## 2024-02-13 PROCEDURE — 76705 ECHO EXAM OF ABDOMEN: CPT | Mod: 26

## 2024-02-13 PROCEDURE — 76376 3D RENDER W/INTRP POSTPROCES: CPT | Mod: 26

## 2024-02-13 PROCEDURE — 93356 MYOCRD STRAIN IMG SPCKL TRCK: CPT

## 2024-02-13 PROCEDURE — 99223 1ST HOSP IP/OBS HIGH 75: CPT

## 2024-02-13 PROCEDURE — 93306 TTE W/DOPPLER COMPLETE: CPT | Mod: 26

## 2024-02-13 RX ORDER — LANOLIN ALCOHOL/MO/W.PET/CERES
3 CREAM (GRAM) TOPICAL AT BEDTIME
Refills: 0 | Status: DISCONTINUED | OUTPATIENT
Start: 2024-02-13 | End: 2024-02-20

## 2024-02-13 RX ORDER — ACETAMINOPHEN 500 MG
650 TABLET ORAL EVERY 6 HOURS
Refills: 0 | Status: DISCONTINUED | OUTPATIENT
Start: 2024-02-13 | End: 2024-02-20

## 2024-02-13 RX ORDER — SODIUM,POTASSIUM PHOSPHATES 278-250MG
2 POWDER IN PACKET (EA) ORAL ONCE
Refills: 0 | Status: COMPLETED | OUTPATIENT
Start: 2024-02-13 | End: 2024-02-13

## 2024-02-13 RX ORDER — ONDANSETRON 8 MG/1
4 TABLET, FILM COATED ORAL EVERY 8 HOURS
Refills: 0 | Status: DISCONTINUED | OUTPATIENT
Start: 2024-02-13 | End: 2024-02-20

## 2024-02-13 RX ORDER — DEXTROSE MONOHYDRATE, SODIUM CHLORIDE, AND POTASSIUM CHLORIDE 50; .745; 4.5 G/1000ML; G/1000ML; G/1000ML
1000 INJECTION, SOLUTION INTRAVENOUS
Refills: 0 | Status: DISCONTINUED | OUTPATIENT
Start: 2024-02-13 | End: 2024-02-20

## 2024-02-13 RX ADMIN — Medication 100 GRAM(S): at 00:34

## 2024-02-13 RX ADMIN — Medication 2 PACKET(S): at 12:25

## 2024-02-13 NOTE — CONSULT NOTE ADULT - ASSESSMENT
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  --CT cervical spine: No acute fracture or traumatic subluxation.  --on tele    Hyponatremia   --replete as needed  --2/2 N/V/D      will follow,    Romy King NP  Hematology/ Oncology  [facility]  [phone] (office)  [phone] (alt office)  Evenings and weekends please call MD on call or office     72yo f w pmh htn, breast ca, p/w generalized fatigue/weakness (x1 week in duration, a/w decreased po intake, n+v+d) leading to syncopal episode    Breast ca  --under the care of Easton Eduardo of [facility]  --newly diagnosed, right sded cT2N0 (Triple negative, w/ robust tumor infiltrating lymphocytes)  --currently on neoadjuvant treatment w/  (Cyclophosphamide, Doxorubicin, Pembrolizumab +Onpro) Last given on 02/02/24  --no systemic treatment while inpatient and/or rehab  --ongoing care after discharge    Pancytopenia  --2/2 malignancy and treatment  --transfuse for Hgb <8, platelets <10    syncopal episode  --2/2 decreased PO intake, N/V/D, and anemia  --CT head: No evidence of acute transcortical infarct, acute intracranial hemorrhage or mass effect.  --CT cervical spine: No acute fracture or traumatic subluxation.  --on tele    Hyponatremia   --replete as needed  --2/2 N/V/D      will follow,    Romy Kign NP  Hematology/ Oncology  [facility]  [phone] (office)  [phone] (alt office)  Evenings and weekends please call MD on call or office

## 2024-02-13 NOTE — PHYSICAL THERAPY INITIAL EVALUATION ADULT - PATIENT/FAMILY/SIGNIFICANT OTHER GOALS STATEMENT, PT EVAL
Care Management Initial Consult    General Information  Assessment completed with: Patient, Care Team Member, Family, Kriss, Trauma CC   Type of CM/SW Visit: Initial Assessment    Primary Care Provider verified and updated as needed: Yes   Readmission within the last 30 days:        Reason for Consult: discharge planning  Advance Care Planning:            Communication Assessment  Patient's communication style: spoken language (English or Bilingual)    Hearing Difficulty or Deaf: yes   Wear Glasses or Blind: yes    Cognitive  Cognitive/Neuro/Behavioral: WDL  Level of Consciousness: other (see comments) (some forgetulness)  Arousal Level: arouses to voice     Mood/Behavior: calm  Best Language: 0 - No aphasia  Speech: clear    Living Environment:   People in home: alone     Current living Arrangements: assisted living, extended care facility  Name of Facility: [facility]    Able to return to prior arrangements: yes       Family/Social Support:  Care provided by: self, homecare agency  Provides care for: no one  Marital Status:   Children          Description of Support System: Supportive, Involved         Current Resources:   Patient receiving home care services:       Community Resources:    Equipment currently used at home: cane, straight, walker, rolling  Supplies currently used at home:      Employment/Financial:  Employment Status: retired        Financial Concerns: No concerns identified           Lifestyle & Psychosocial Needs:  Social Determinants of Health     Tobacco Use: Low Risk      Smoking Tobacco Use: Never Smoker     Smokeless Tobacco Use: Never Used   Alcohol Use:      Frequency of Alcohol Consumption:      Average Number of Drinks:      Frequency of Binge Drinking:    Financial Resource Strain:      Difficulty of Paying Living Expenses:    Food Insecurity:      Worried About Running Out of Food in the Last Year:      Ran Out of Food in the Last Year:    Transportation Needs:      Lack of  Transportation (Medical):      Lack of Transportation (Non-Medical):    Physical Activity:      Days of Exercise per Week:      Minutes of Exercise per Session:    Stress:      Feeling of Stress :    Social Connections:      Frequency of Communication with Friends and Family:      Frequency of Social Gatherings with Friends and Family:      Attends Anabaptist Services:      Active Member of Clubs or Organizations:      Attends Club or Organization Meetings:      Marital Status:    Intimate Partner Violence:      Fear of Current or Ex-Partner:      Emotionally Abused:      Physically Abused:      Sexually Abused:    Depression:      PHQ-2 Score:    Housing Stability:      Unable to Pay for Housing in the Last Year:      Number of Places Lived in the Last Year:      Unstable Housing in the Last Year:        Functional Status:  Prior to admission patient needed assistance:              Mental Health Status:          Chemical Dependency Status:                Values/Beliefs:  Spiritual, Cultural Beliefs, Anabaptist Practices, Values that affect care:                 Additional Information:  Consult for discharge planning. Patient admitted on 10/16/21 for hip fracture with a tentative discharge date 10/19/21. Writer reviewed chart and TCU recommendations at discharge. Patient is being followed by Kriss with [facility]. Per chart note, Kriss spoke to patient and daughter Danika. Patient provided choices of [place], [facility], [facility], [facility] and [facility]. Per chart note, patient will need transport set up and is aware of out of pocket cost.     LO Marie         Return home

## 2024-02-13 NOTE — PHYSICAL THERAPY INITIAL EVALUATION ADULT - PLANNED THERAPY INTERVENTIONS, PT EVAL
balance training/gait training/strengthening/transfer training Stairs: GOAL: Pt will ascend/descend 6 stairs with contact guard in order to return home safety in 2 weeks./balance training/gait training/strengthening/transfer training

## 2024-02-13 NOTE — PROGRESS NOTE ADULT - ASSESSMENT
74yo f w pmh htn, breast ca, p/w generalized fatigue/weakness leading to fall, unclear etiology; in er, found to have multiple metabolic disturbances suggestive of hypovolemia; admit to medicine for further mgmt

## 2024-02-13 NOTE — H&P ADULT - PROBLEM SELECTOR PLAN 2
[place] ambulatory encounter(APSO):    [facility]  [address] [address]  [phone]    Assessment & Plan:  1. Angular cheilitis    2. Acute vulvitis      Return if symptoms worsen or fail to improve.  Suspect allergic cheilitis, stop topicals, begin topical hydrocortisone 2.5% TID prn, for up to ten days.    Consult GYN regarding vulvar itching complaint.      The patient indicates understanding of these issues and agrees with the plan.       CHIEF COMPLAINT:   Chief Complaint   Patient presents with   • Mouth/Lip Problem     complains of lip swelling, itching.  Also complains of also has swelling, itching of labial area.           History of present illness:    She is complaining of/requesting evaluation for she has had a few weeks of lip commissure, some blistering of lips of same time.  Tried various  Lip products, Vaseline, Shlomo Bees, Abreva.  No history of cold sores, though  has cold sores.  Lips itch a lot.  Lips swollen last few days,  Awakens middle of night.  vaseline has worked best.      LMP 29 Jan for five days.  Itching introitus last five days.  No discharge.  Few months ago had introitus itching, tea tree oil application.  She prefers to see GYN  For that.        PAST HISTORIES:  I have reviewed the past medical history,  social history, medications and allergies listed in the medical record as obtained by my nursing staff and support staff and agree with their documentation.  ALLERGIES:  No Known Allergies  Current Outpatient Medications   Medication Sig Dispense Refill   • escitalopram (LEXAPRO) 10 MG tablet Take 1 tablet by mouth daily. 90 tablet 0   • norgestimate-ethinyl estradiol (MONONESSA) 0.25-35 MG-MCG per tablet Take 1 tablet by mouth daily. 84 tablet 3   • LORazepam (ATIVAN) 0.5 MG tablet Take 1 tablet by mouth every 8 hours as needed for Anxiety. 10 tablet 0   • acetaminophen (TYLENOL) 500 MG tablet Take  1,000 mg by mouth every 6 hours as needed.     • fluticasone (FLONASE) 50 MCG/ACT nasal spray Spray 2 sprays in each nostril daily. 1 Bottle 2   • Ascorbic Acid (VITAMIN C PO) Take  by mouth.     • docosanol (ABREVA) 10 % Cream cream   0     No current facility-administered medications for this visit.      Past Medical History:   Diagnosis Date   • Kidney stone      Past Surgical History:   Procedure Laterality Date   • Removal of kidney stone              PHYSICAL exam:    Vitals:    02/07/19 0956   BP: 120/76   Pulse: 64   Temp: 97.8 °F (36.6 °C)   TempSrc: Temporal Artery   Weight: 74.6 kg   Height: 5' 1\" (1.549 m)     GENERAL:  Pleasant, Cooperative in no distress.  Color is good.  HEENT: Head normocephalic, atraumatic. Eyes: Extraocular movements  intact. Pupils are equally responsive and reactive to light. Conjunctivae  pink. Sclerae anicteric. Ears: External ears are normal. Tympanic  membranes and external auditory canals are normal. Nasopharyngeal mucosa  is normal. Oropharynx: lips upper and lower are mildly swollen, microblisters at lip commissure and at vermmillion border.    NECK: Supple without mass. No thyromegaly or adenopathy.                  hypoNa, hypoCl, hypoK, hypoMg + hypoP, HAGMA, hypoCa (despite correction w albumin), marissa w bun/cr > 20:1; consistent with dehydration + hypovolemia  s/p 1 L NS + ca glu, mgso4, mgo, kcl  follow up urine studies; abg/vbg; lactic acid + bhb; gi pcr + fecal calprotectin  Monitor I/Os, daily weights, UO, BUN/Cr, volume status, acid-base balance, electrolytes (na, k, ca, mg, p)  hold home hctz  avoid nephrotoxic agents; appropriate dose adjustments for all renally cleared medications   encourage po intake; IVF resusci + electrolyte supplementation as needed in the mean time

## 2024-02-13 NOTE — PROGRESS NOTE ADULT - PROBLEM SELECTOR PLAN 1
trauma work up with no acute significant findings  neuroimaging with no acute significant findings  ekg with no new st seg - t wave changes suggestive of acute ischemia - shows sinus tach  trop 77->67, suspect demand and/or decreased clearance  -TTE reassuring, EF 54% and no wall motion abnormalities.   follow up tsh, folate, b12, rpr; orthostatic vitals, tte; ua +/- uc, rvp + cxr; ruq us + hepatitis panel  frequent neurochecks  monitor on telemetry  maintain fall + frac, delirium, seizure, aspiration precautions; keep head end of bed elevated  nutrition consult  pt/ot eval + sw/cm consult for disposition

## 2024-02-13 NOTE — H&P ADULT - PROBLEM SELECTOR PLAN 3
severe anemia, moderate thrombocytopenia, moderate neutropenia  unclear baseline hgb, plt, white count  no gross bleeding in er  per conversation between er and [facility], suspect 2/2 recent chemo  s/p 1 u prbc in er  follow up post transfusion cbc; anemia work up  Monitor hgb, plt w daily CBC; Goal Hb >7g/dl,  Plt >10k, 20k if septic, 50k if actively bleeding  maintain adequate PIV and active type and screen; transfuse blood product as needed to maintain goal

## 2024-02-13 NOTE — OCCUPATIONAL THERAPY INITIAL EVALUATION ADULT - LIVES WITH, PROFILE
Pt lives in [place] +spouse, +3 DARRIN and 10 steps indoors to bedroom. Pt reports independence with all aspects of self care and functional mobility without AD PTA.

## 2024-02-13 NOTE — H&P ADULT - HISTORY OF PRESENT ILLNESS
72yo f w pmh htn, breast ca, p/w generalized fatigue/weakness leading to fall; in er, found to have multiple metabolic disturbances suggestive of hypovolemia; admit to medicine for further mgmt 72yo f w pmh htn, breast ca, p/w generalized fatigue/weakness (x1 week in duration, a/w decreased po intake, n+v+d) leading to fall; unwitnessed, +head strike, not on blood thinners, unclear whether a/w loc; patient's family member found patient on floor early this morning; contacted outpatient provider who recommended patient go to er for further evaluation. reportedly, patient recently underwent chemo for her breast ca. in er, found to have multiple metabolic disturbances suggestive of hypovolemia; admit to medicine for further mgmt

## 2024-02-13 NOTE — PHYSICAL THERAPY INITIAL EVALUATION ADULT - PERTINENT HX OF CURRENT PROBLEM, REHAB EVAL
Pt is a 73F w pmh htn, breast ca, p/w generalized fatigue/weakness (x1 week in duration, a/w decreased po intake, n+v+d) leading to fall; unwitnessed, +head strike, not on blood thinners, unclear whether a/w loc; patient's family member found patient on floor early this morning; contacted outpatient provider who recommended patient go to ER for further evaluation. reportedly, patient recently underwent chemo for her breast ca. in er, found to have multiple metabolic disturbances suggestive of hypovolemia; admit to medicine for further mgmt.

## 2024-02-13 NOTE — PROGRESS NOTE ADULT - SUBJECTIVE AND OBJECTIVE BOX
Patient is a 73y old  Female who presents with a chief complaint of generalized fatigue/weakness (13 Feb 2024 11:58)        SUBJECTIVE / OVERNIGHT EVENTS: Patient reports feeling better today but still weak and fatigued and decreased appetite. No abd pain.       MEDICATIONS  (STANDING):  sodium chloride 0.9% with potassium chloride 20 mEq/L 1000 milliLiter(s) (100 mL/Hr) IV Continuous <Continuous>    MEDICATIONS  (PRN):  acetaminophen     Tablet .. 650 milliGRAM(s) Oral every 6 hours PRN Temp greater or equal to 38C (100.4F), Mild Pain (1 - 3)  aluminum hydroxide/magnesium hydroxide/simethicone Suspension 30 milliLiter(s) Oral every 4 hours PRN Dyspepsia  melatonin 3 milliGRAM(s) Oral at bedtime PRN Insomnia  ondansetron Injectable 4 milliGRAM(s) IV Push every 8 hours PRN Nausea and/or Vomiting      Vital Signs Last 24 Hrs  T(C): 36.7 (13 Feb 2024 19:20), Max: 36.8 (13 Feb 2024 18:45)  T(F): 98.1 (13 Feb 2024 19:20), Max: 98.2 (13 Feb 2024 18:45)  HR: 90 (13 Feb 2024 19:20) (83 - 105)  BP: 141/91 (13 Feb 2024 19:20) (99/74 - 149/78)  BP(mean): 82 (13 Feb 2024 00:40) (82 - 83)  RR: 18 (13 Feb 2024 19:20) (18 - 19)  SpO2: 92% (13 Feb 2024 19:20) (92% - 99%)    Parameters below as of 13 Feb 2024 19:20  Patient On (Oxygen Delivery Method): room air      CAPILLARY BLOOD GLUCOSE        I&O's Summary    12 Feb 2024 07:01  -  13 Feb 2024 07:00  --------------------------------------------------------  IN: 250 mL / OUT: 0 mL / NET: 250 mL    13 Feb 2024 07:01  -  13 Feb 2024 19:27  --------------------------------------------------------  IN: 250 mL / OUT: 0 mL / NET: 250 mL          PHYSICAL EXAM:   GENERAL: NAD, thin  HEAD:  Atraumatic, Normocephalic  EYES:   conjunctiva and sclera pale  NECK: Supple,   CHEST/LUNG: Clear to auscultation bilaterally; No wheeze  HEART: S1S2 normal. Regular rate and rhythm; No murmurs, rubs, or gallops  ABDOMEN: Soft, Nontender, Nondistended; Bowel sounds present  EXTREMITIES:    No clubbing, cyanosis, or edema  PSYCH/Neuro: AAOx3. Non-focal.   SKIN: No rashes or lesions      LABS:                        7.1    1.56  )-----------( 49       ( 13 Feb 2024 06:35 )             20.9     02-13    139  |  97  |  36<H>  ----------------------------<  80  3.3<L>   |  24  |  0.76    Ca    6.6<L>      13 Feb 2024 06:35  Phos  1.7     02-13  Mg     1.7     02-13    TPro  5.8<L>  /  Alb  2.7<L>  /  TBili  0.7  /  DBili  x   /  AST  116<H>  /  ALT  173<H>  /  AlkPhos  82  02-13    PT/INR - ( 13 Feb 2024 06:35 )   PT: 14.7 sec;   INR: 1.41 ratio         PTT - ( 13 Feb 2024 06:35 )  PTT:17.7 sec  CARDIAC MARKERS ( 12 Feb 2024 18:04 )  x     / x     / 46 U/L / x     / x          Urinalysis Basic - ( 13 Feb 2024 06:35 )    Color: x / Appearance: x / SG: x / pH: x  Gluc: 80 mg/dL / Ketone: x  / Bili: x / Urobili: x   Blood: x / Protein: x / Nitrite: x   Leuk Esterase: x / RBC: x / WBC x   Sq Epi: x / Non Sq Epi: x / Bacteria: x      < from: US Abdomen Upper Quadrant Right (02.13.24 @ 09:27) >  IMPRESSION:    1. The gallbladder is unremarkable without stones. No biliary dilatation.  2. Evaluation of the liver is technically limited on this examination..   The liver parenchyma demonstrates heterogeneous echotexture with   scattered ill-defined regions of increased echogenicity, possibly   reflecting hepatic steatosis. No discrete focal abnormality is   identified. Further assessment with abdominal CT or MRI is suggested in   this patient with history of breast cancer, as clinically warranted..  3. Limited visualization of the pancreas on this exam.    < end of copied text >      < from: TTE W or WO Ultrasound Enhancing Agent (02.13.24 @ 07:34) >  CONCLUSIONS:      1. Left ventricular cavity is small. Left ventricular wall thickness is normal. Left ventricular systolic function is normal with an ejection fraction of 54 % by Yates's method of disks. There are no regional wall motion abnormalities seen.   2. Normal left ventricular diastolic function, with normal filling pressure.   3. Normal right ventricular cavity size, wall thickness, and normal systolic function.   4. There is trace tricuspid regurgitation. Estimated pulmonary artery systolic pressure is 20 mmHg.   5. No pericardial effusion seen.   6. No prior echocardiogram is available for comparison.    < end of copied text >      RADIOLOGY & ADDITIONAL TESTS:    Imaging Personally Reviewed: ruq us and TTE  Consultant(s) Notes Reviewed:  heme/onoc  Care Discussed with Consultants/Other Providers: heme/onco

## 2024-02-13 NOTE — OCCUPATIONAL THERAPY INITIAL EVALUATION ADULT - PERTINENT HX OF CURRENT PROBLEM, REHAB EVAL
73F w pmh htn, breast ca, p/w generalized fatigue/weakness (x1 week in duration, a/w decreased po intake, n+v+d) leading to fall; unwitnessed, +head strike, not on blood thinners, unclear whether a/w loc; patient's family member found patient on floor early this morning; contacted outpatient provider who recommended patient go to ER for further evaluation. reportedly, patient recently underwent chemo for her breast ca. in er, found to have multiple metabolic disturbances suggestive of hypovolemia; admit to medicine for further mgmt.    H.1  Hct: 20.9; cleared for ILANA Hilliard for session.

## 2024-02-13 NOTE — CONSULT NOTE ADULT - SUBJECTIVE AND OBJECTIVE BOX
Reason for consult: breast ca    HPI:  72yo f w pmh htn, breast ca, p/w generalized fatigue/weakness (x1 week in duration, a/w decreased po intake, n+v+d) leading to fall; unwitnessed, +head strike, not on blood thinners, unclear whether a/w loc; patient's family member found patient on floor early this morning; contacted outpatient provider who recommended patient go to er for further evaluation. reportedly, patient recently underwent chemo for her breast ca. in er, found to have multiple metabolic disturbances suggestive of hypovolemia; admit to medicine for further mgmt (13 Feb 2024 03:58)    Hematology/Oncology called to see patient who follows with Easton Eduardo of [facility] [place] for the management of breast ca    PAST MEDICAL & SURGICAL HISTORY:  Essential hypertension      Breast cancer      At risk for infection due to chemotherapy      Carcinoma of breast treated with adjuvant chemotherapy          FAMILY HISTORY:      Alochol: Denied  Smoking: Nonsmoker  Drug Use: Denied  Marital Status:         Allergies    No Known Allergies    Intolerances        MEDICATIONS  (STANDING):  sodium chloride 0.9% with potassium chloride 20 mEq/L 1000 milliLiter(s) (100 mL/Hr) IV Continuous <Continuous>    MEDICATIONS  (PRN):  acetaminophen     Tablet .. 650 milliGRAM(s) Oral every 6 hours PRN Temp greater or equal to 38C (100.4F), Mild Pain (1 - 3)  aluminum hydroxide/magnesium hydroxide/simethicone Suspension 30 milliLiter(s) Oral every 4 hours PRN Dyspepsia  melatonin 3 milliGRAM(s) Oral at bedtime PRN Insomnia  ondansetron Injectable 4 milliGRAM(s) IV Push every 8 hours PRN Nausea and/or Vomiting      ROS  No fever, sweats, chills  No epistaxis, HA, sore throat  No CP, SOB, cough, sputum  No n/v/d, abd pain, melena, hematochezia  No edema  No rash  No anxiety  No back pain, joint pain  No bleeding, bruising  No dysuria, hematuria    T(C): 36.6 (02-13-24 @ 05:13), Max: 36.7 (02-13-24 @ 04:00)  HR: 94 (02-13-24 @ 05:13) (91 - 118)  BP: 122/78 (02-13-24 @ 05:13) (82/52 - 122/78)  RR: 18 (02-13-24 @ 05:13) (17 - 19)  SpO2: 99% (02-13-24 @ 05:13) (95% - 99%)  Wt(kg): --    PE  NAD  Awake, alert  Anicteric, MMM  RRR  CTAB  Abd soft, NT, ND  No c/c/e  No rash grossly  FROM                          7.1    1.56  )-----------( 49       ( 13 Feb 2024 06:35 )             20.9       02-13    139  |  97  |  36<H>  ----------------------------<  80  3.3<L>   |  24  |  0.76    Ca    6.6<L>      13 Feb 2024 06:35  Phos  1.7     02-13  Mg     1.7     02-13    TPro  5.8<L>  /  Alb  2.7<L>  /  TBili  0.7  /  DBili  x   /  AST  116<H>  /  ALT  173<H>  /  AlkPhos  82  02-13    ACC: [id number] EXAM:  CT CERVICAL SPINE   ORDERED BY:  VANESSA MONROE     ACC: [id number] EXAM:  CT BRAIN   ORDERED BY:  VANESSA MONROE     PROCEDURE DATE:  02/12/2024          INTERPRETATION:  CLINICAL INDICATION: Fall    CT BRAIN:    TECHNIQUE:  Multiple contiguous axial images were obtained from the skull   base to the vertex without the use of intravenous contrast. Reformatted   coronal and sagittal images were subsequently obtained and reviewed.    COMPARISON EXAMINATION: None.    FINDINGS:  There is no CT evidence of acute transcortical infarct. Age-related   involutional changes and chronic microvascular ischemic changes.    There is no hydrocephalus, mass effect, midline shift, or acute   intracranial hemorrhage. No extra-axial collection. Basal cisterns are   patent.    The visualized paranasal sinuses and mastoid air cells are clear.    The calvarium is intact.      CT CERVICAL SPINE:    TECHNIQUE:  Axial images were obtained through the cervical spine using   multislice helical technique.  Reformatted coronal and sagittal images   were subsequently obtained and reviewed.    COMPARISON EXAMINATION:  None.    FINDINGS:  There is no prevertebral soft tissue swelling. There is no splaying of   the spinous processes. The occipital condyles are normal. Lateral masses   of C1 align normally with C2. The atlantodental and atlanto-occipital   joints are maintained. No lucent fracture line is identified. There is no   spondylolisthesis. Facet joint alignments are maintained.    Multilevel degenerative osteoarthritis and degenerative disc disease are   present. Findings include marginal osteophytes, uncovertebral spurring,   loss of normal disc space height and endplate sclerosis, and facet joint   space compartment narrowing with subchondral sclerosis and hypertrophic   osteophytes at multiple levels. Canal contents are suboptimally evaluated   inherent to CT technique.      IMPRESSION:  CT head: No evidence of acute transcortical infarct, acute intracranial   hemorrhage or mass effect.    CT cervical spine: No acute fracture or traumatic subluxation.       Reason for consult: breast ca    HPI:  74yo f w pmh htn, breast ca, p/w generalized fatigue/weakness (x1 week in duration, a/w decreased po intake, n+v+d) leading to fall; unwitnessed, +head strike, not on blood thinners, unclear whether a/w loc; patient's family member found patient on floor early this morning; contacted outpatient provider who recommended patient go to er for further evaluation. reportedly, patient recently underwent chemo for her breast ca. in er, found to have multiple metabolic disturbances suggestive of hypovolemia; admit to medicine for further mgmt (13 Feb 2024 03:58)    Hematology/Oncology called to see patient who follows with Easton Eduardo of [facility] [place] of [place]    PAST MEDICAL & SURGICAL HISTORY:  Essential hypertension      Breast cancer      At risk for infection due to chemotherapy      Carcinoma of breast treated with adjuvant chemotherapy          FAMILY HISTORY:      Alochol: Denied  Smoking: Nonsmoker  Drug Use: Denied  Marital Status:         Allergies    No Known Allergies    Intolerances        MEDICATIONS  (STANDING):  sodium chloride 0.9% with potassium chloride 20 mEq/L 1000 milliLiter(s) (100 mL/Hr) IV Continuous <Continuous>    MEDICATIONS  (PRN):  acetaminophen     Tablet .. 650 milliGRAM(s) Oral every 6 hours PRN Temp greater or equal to 38C (100.4F), Mild Pain (1 - 3)  aluminum hydroxide/magnesium hydroxide/simethicone Suspension 30 milliLiter(s) Oral every 4 hours PRN Dyspepsia  melatonin 3 milliGRAM(s) Oral at bedtime PRN Insomnia  ondansetron Injectable 4 milliGRAM(s) IV Push every 8 hours PRN Nausea and/or Vomiting      ROS  No fever, sweats, chills  No epistaxis, HA, sore throat  No CP, SOB, cough, sputum  No n/v/d, abd pain, melena, hematochezia  No edema  No rash  No anxiety  No back pain, joint pain  No bleeding, bruising  No dysuria, hematuria    T(C): 36.6 (02-13-24 @ 05:13), Max: 36.7 (02-13-24 @ 04:00)  HR: 94 (02-13-24 @ 05:13) (91 - 118)  BP: 122/78 (02-13-24 @ 05:13) (82/52 - 122/78)  RR: 18 (02-13-24 @ 05:13) (17 - 19)  SpO2: 99% (02-13-24 @ 05:13) (95% - 99%)  Wt(kg): --    PE  frail  Awake, alert  Anicteric, MMM  RRR  CTAB  Abd soft, NT, ND  No c/c                        7.1    1.56  )-----------( 49       ( 13 Feb 2024 06:35 )             20.9       02-13    139  |  97  |  36<H>  ----------------------------<  80  3.3<L>   |  24  |  0.76    Ca    6.6<L>      13 Feb 2024 06:35  Phos  1.7     02-13  Mg     1.7     02-13    TPro  5.8<L>  /  Alb  2.7<L>  /  TBili  0.7  /  DBili  x   /  AST  116<H>  /  ALT  173<H>  /  AlkPhos  82  02-13    ACC: [id number] EXAM:  CT CERVICAL SPINE   ORDERED BY:  VANESSA MONROE     ACC: [id number] EXAM:  CT BRAIN   ORDERED BY:  VANESSA MONROE     PROCEDURE DATE:  02/12/2024          INTERPRETATION:  CLINICAL INDICATION: Fall    CT BRAIN:    TECHNIQUE:  Multiple contiguous axial images were obtained from the skull   base to the vertex without the use of intravenous contrast. Reformatted   coronal and sagittal images were subsequently obtained and reviewed.    COMPARISON EXAMINATION: None.    FINDINGS:  There is no CT evidence of acute transcortical infarct. Age-related   involutional changes and chronic microvascular ischemic changes.    There is no hydrocephalus, mass effect, midline shift, or acute   intracranial hemorrhage. No extra-axial collection. Basal cisterns are   patent.    The visualized paranasal sinuses and mastoid air cells are clear.    The calvarium is intact.      CT CERVICAL SPINE:    TECHNIQUE:  Axial images were obtained through the cervical spine using   multislice helical technique.  Reformatted coronal and sagittal images   were subsequently obtained and reviewed.    COMPARISON EXAMINATION:  None.    FINDINGS:  There is no prevertebral soft tissue swelling. There is no splaying of   the spinous processes. The occipital condyles are normal. Lateral masses   of C1 align normally with C2. The atlantodental and atlanto-occipital   joints are maintained. No lucent fracture line is identified. There is no   spondylolisthesis. Facet joint alignments are maintained.    Multilevel degenerative osteoarthritis and degenerative disc disease are   present. Findings include marginal osteophytes, uncovertebral spurring,   loss of normal disc space height and endplate sclerosis, and facet joint   space compartment narrowing with subchondral sclerosis and hypertrophic   osteophytes at multiple levels. Canal contents are suboptimally evaluated   inherent to CT technique.      IMPRESSION:  CT head: No evidence of acute transcortical infarct, acute intracranial   hemorrhage or mass effect.    CT cervical spine: No acute fracture or traumatic subluxation.

## 2024-02-13 NOTE — PHYSICAL THERAPY INITIAL EVALUATION ADULT - BALANCE TRAINING, PT EVAL
GOAL: Pt will improve standing balance by 1 grade from poor to fair to improve ease with ADLs in 2 weeks.

## 2024-02-13 NOTE — CONSULT NOTE ADULT - NS ATTEND AMEND GEN_ALL_CORE FT
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

## 2024-02-13 NOTE — H&P ADULT - PROBLEM SELECTOR PLAN 1
trauma work up with no acute significant findings  neuroimaging with no acute significant findings  ekg with no new st seg - t wave changes suggestive of acute ischemia - shows sinus tach  trop 77->67, suspect demand and/or decreased clearance  follow up tsh, folate, b12, rpr; orthostatic vitals, tte; ua +/- uc, rvp + cxr; ruq us + hepatitis panel  Monitor mental status with frequent neurochecks  monitor on telemetry  maintain fall + frac, delirium, seizure, aspiration precautions; keep head end of bed elevated  nutrition consult  pt/ot eval + sw/cm consult for disposition trauma work up with no acute significant findings  neuroimaging with no acute significant findings  ekg with no new st seg - t wave changes suggestive of acute ischemia - shows sinus tach  trop 77->67, suspect demand and/or decreased clearance  follow up tsh, folate, b12, rpr; orthostatic vitals, tte; ua +/- uc, rvp + cxr; ruq us + hepatitis panel  frequent neurochecks  monitor on telemetry  maintain fall + frac, delirium, seizure, aspiration precautions; keep head end of bed elevated  nutrition consult  pt/ot eval + sw/cm consult for disposition

## 2024-02-13 NOTE — H&P ADULT - NSHPPHYSICALEXAM_GEN_ALL_CORE
T(C): 36.7 (02-13-24 @ 04:00), Max: 36.7 (02-13-24 @ 04:00)  HR: 91 (02-13-24 @ 04:00) (91 - 118)  BP: 117/85 (02-13-24 @ 04:00) (82/52 - 117/85)  RR: 19 (02-13-24 @ 04:00) (17 - 19)  SpO2: 95% (02-13-24 @ 04:00) (95% - 99%)  GENERAL: NAD, lying in bed    EYES: EOMI, PERRLA; conjunctiva and sclera clear  ENMT: Moist oral mucosa, no pharyngeal injection or exudates;   NECK: Supple, no palpable masses; no JVD  RESPIRATORY: Normal respiratory effort; lungs are clear to auscultation bilaterally  CARDIOVASCULAR: Regular rate and rhythm, normal S1 and S2, no murmur/rub/gallop; No lower extremity edema; Peripheral pulses are 2+ bilaterally  ABDOMEN: Nontender to palpation, normoactive bowel sounds, no rebound/guarding;   MUSCULOSKELETAL: no joint swelling or tenderness to palpation  PSYCH: A+O to person, place, and time; affect appropriate  NEUROLOGY: CN 2-12 are intact and symmetric; no gross motor or sensory deficits   SKIN: No rashes; no palpable lesions T(C): 36.7 (02-13-24 @ 04:00), Max: 36.7 (02-13-24 @ 04:00)  HR: 91 (02-13-24 @ 04:00) (91 - 118)  BP: 117/85 (02-13-24 @ 04:00) (82/52 - 117/85)  RR: 19 (02-13-24 @ 04:00) (17 - 19)  SpO2: 95% (02-13-24 @ 04:00) (95% - 99%)  GENERAL: NAD, lying in bed    EYES: EOMI, PERRLA; conjunctiva and sclera clear; laceration over right eyebrow  ENMT: Moist oral mucosa, no pharyngeal injection or exudates;   NECK: Supple, no palpable masses; no JVD  RESPIRATORY: Normal respiratory effort; lungs are clear to auscultation bilaterally  CARDIOVASCULAR: Regular rate and rhythm, normal S1 and S2, no murmur/rub/gallop; No lower extremity edema; Peripheral pulses are 2+ bilaterally  ABDOMEN: Nontender to palpation, normoactive bowel sounds, no rebound/guarding;   MUSCULOSKELETAL: no joint swelling or tenderness to palpation  PSYCH: A+O to person, place, and time; affect appropriate  NEUROLOGY: CN 2-12 are intact and symmetric; no gross motor or sensory deficits   SKIN: No rashes; no palpable lesions

## 2024-02-13 NOTE — PHYSICAL THERAPY INITIAL EVALUATION ADULT - ADDITIONAL COMMENTS
pt reside in a private home with . pt has 3 stairs to enter and 1 flight once inside to 2nd floor. pt reports  assists with all ADLs and ambulates with no AD.

## 2024-02-13 NOTE — PATIENT PROFILE ADULT - NSPROPOAPRESSUREINJURYCT_GEN_A_NUR
1 Tremfya Counseling: I discussed with the patient the risks of guselkumab including but not limited to immunosuppression, serious infections, worsening of inflammatory bowel disease and drug reactions.  The patient understands that monitoring is required including a PPD at baseline and must alert us or the primary physician if symptoms of infection or other concerning signs are noted.

## 2024-02-13 NOTE — H&P ADULT - NSHPREVIEWOFSYSTEMS_GEN_ALL_CORE
CONSTITUTIONAL: No fever. +weakness  ENMT:  No sinus or throat pain  RESPIRATORY: No cough, wheezing, chills or hemoptysis; No shortness of breath  CARDIOVASCULAR: No chest pain, palpitations, dizziness, or leg swelling  GASTROINTESTINAL: No abdominal or epigastric pain. No nausea, vomiting, or hematemesis; No diarrhea or constipation. No melena or hematochezia.  GENITOURINARY: No dysuria or incontinence  NEUROLOGICAL: No headaches, memory loss, loss of strength, numbness, or tremors  SKIN: No rashes,  No hives or eczema  ENDOCRINE: No heat or cold intolerance; No hair loss  MUSCULOSKELETAL: No joint pain or swelling; No muscle, back, or extremity pain  PSYCHIATRIC: No depression, anxiety, mood swings, or difficulty sleeping  HEME/LYMPH: No easy bruising, or bleeding gums; no enlarged LN

## 2024-02-13 NOTE — PATIENT PROFILE ADULT - FALL HARM RISK - HARM RISK INTERVENTIONS

## 2024-02-13 NOTE — H&P ADULT - ASSESSMENT
72yo f w pmh htn, breast ca, p/w generalized fatigue/weakness leading to fall; in er, found to have multiple metabolic disturbances suggestive of hypovolemia; admit to medicine for further mgmt  74yo f w pmh htn, breast ca, p/w generalized fatigue/weakness leading to fall, unclear etiology; in er, found to have multiple metabolic disturbances suggestive of hypovolemia; admit to medicine for further mgmt

## 2024-02-14 DIAGNOSIS — E87.8 OTHER DISORDERS OF ELECTROLYTE AND FLUID BALANCE, NOT ELSEWHERE CLASSIFIED: ICD-10-CM

## 2024-02-14 LAB
ALBUMIN SERPL ELPH-MCNC: 2.6 G/DL — LOW (ref 3.3–5)
ALP SERPL-CCNC: 84 U/L — SIGNIFICANT CHANGE UP (ref 40–120)
ALT FLD-CCNC: 110 U/L — HIGH (ref 10–45)
ANION GAP SERPL CALC-SCNC: 11 MMOL/L — SIGNIFICANT CHANGE UP (ref 5–17)
ANION GAP SERPL CALC-SCNC: 13 MMOL/L — SIGNIFICANT CHANGE UP (ref 5–17)
AST SERPL-CCNC: 51 U/L — HIGH (ref 10–40)
BASOPHILS # BLD AUTO: 0 K/UL — SIGNIFICANT CHANGE UP (ref 0–0.2)
BASOPHILS NFR BLD AUTO: 0 % — SIGNIFICANT CHANGE UP (ref 0–2)
BILIRUB SERPL-MCNC: 0.8 MG/DL — SIGNIFICANT CHANGE UP (ref 0.2–1.2)
BUN SERPL-MCNC: 17 MG/DL — SIGNIFICANT CHANGE UP (ref 7–23)
BUN SERPL-MCNC: 22 MG/DL — SIGNIFICANT CHANGE UP (ref 7–23)
CALCIUM SERPL-MCNC: 6.4 MG/DL — CRITICAL LOW (ref 8.4–10.5)
CALCIUM SERPL-MCNC: 6.6 MG/DL — LOW (ref 8.4–10.5)
CHLORIDE SERPL-SCNC: 100 MMOL/L — SIGNIFICANT CHANGE UP (ref 96–108)
CHLORIDE SERPL-SCNC: 100 MMOL/L — SIGNIFICANT CHANGE UP (ref 96–108)
CO2 SERPL-SCNC: 26 MMOL/L — SIGNIFICANT CHANGE UP (ref 22–31)
CO2 SERPL-SCNC: 27 MMOL/L — SIGNIFICANT CHANGE UP (ref 22–31)
CREAT SERPL-MCNC: 0.44 MG/DL — LOW (ref 0.5–1.3)
CREAT SERPL-MCNC: 0.47 MG/DL — LOW (ref 0.5–1.3)
DOHLE BOD BLD QL SMEAR: PRESENT — SIGNIFICANT CHANGE UP
EGFR: 101 ML/MIN/1.73M2 — SIGNIFICANT CHANGE UP
EGFR: 99 ML/MIN/1.73M2 — SIGNIFICANT CHANGE UP
EOSINOPHIL # BLD AUTO: 0 K/UL — SIGNIFICANT CHANGE UP (ref 0–0.5)
EOSINOPHIL NFR BLD AUTO: 0 % — SIGNIFICANT CHANGE UP (ref 0–6)
GLUCOSE SERPL-MCNC: 102 MG/DL — HIGH (ref 70–99)
GLUCOSE SERPL-MCNC: 81 MG/DL — SIGNIFICANT CHANGE UP (ref 70–99)
HBV CORE AB SER-ACNC: SIGNIFICANT CHANGE UP
HBV SURFACE AB SER-ACNC: SIGNIFICANT CHANGE UP
HCT VFR BLD CALC: 26 % — LOW (ref 34.5–45)
HCV AB S/CO SERPL IA: 0.04 S/CO — SIGNIFICANT CHANGE UP (ref 0–0.99)
HCV AB SERPL-IMP: SIGNIFICANT CHANGE UP
HGB BLD-MCNC: 8.8 G/DL — LOW (ref 11.5–15.5)
LYMPHOCYTES # BLD AUTO: 0.07 K/UL — LOW (ref 1–3.3)
LYMPHOCYTES # BLD AUTO: 1.8 % — LOW (ref 13–44)
MAGNESIUM SERPL-MCNC: 1.1 MG/DL — LOW (ref 1.6–2.6)
MAGNESIUM SERPL-MCNC: 1.3 MG/DL — LOW (ref 1.6–2.6)
MANUAL SMEAR VERIFICATION: SIGNIFICANT CHANGE UP
MCHC RBC-ENTMCNC: 30.1 PG — SIGNIFICANT CHANGE UP (ref 27–34)
MCHC RBC-ENTMCNC: 33.8 GM/DL — SIGNIFICANT CHANGE UP (ref 32–36)
MCV RBC AUTO: 89 FL — SIGNIFICANT CHANGE UP (ref 80–100)
METAMYELOCYTES # FLD: 0.9 % — HIGH (ref 0–0)
MONOCYTES # BLD AUTO: 0.32 K/UL — SIGNIFICANT CHANGE UP (ref 0–0.9)
MONOCYTES NFR BLD AUTO: 8 % — SIGNIFICANT CHANGE UP (ref 2–14)
NEUTROPHILS # BLD AUTO: 3.52 K/UL — SIGNIFICANT CHANGE UP (ref 1.8–7.4)
NEUTROPHILS NFR BLD AUTO: 79.6 % — HIGH (ref 43–77)
NEUTS BAND # BLD: 9.7 % — HIGH (ref 0–8)
NRBC # BLD: 2 /100 WBCS — HIGH (ref 0–0)
PHOSPHATE SERPL-MCNC: 1.6 MG/DL — LOW (ref 2.5–4.5)
PHOSPHATE SERPL-MCNC: 3.2 MG/DL — SIGNIFICANT CHANGE UP (ref 2.5–4.5)
PLAT MORPH BLD: ABNORMAL
PLATELET # BLD AUTO: 49 K/UL — LOW (ref 150–400)
POTASSIUM SERPL-MCNC: 2.8 MMOL/L — CRITICAL LOW (ref 3.5–5.3)
POTASSIUM SERPL-MCNC: 4.6 MMOL/L — SIGNIFICANT CHANGE UP (ref 3.5–5.3)
POTASSIUM SERPL-SCNC: 2.8 MMOL/L — CRITICAL LOW (ref 3.5–5.3)
POTASSIUM SERPL-SCNC: 4.6 MMOL/L — SIGNIFICANT CHANGE UP (ref 3.5–5.3)
PROT SERPL-MCNC: 5.4 G/DL — LOW (ref 6–8.3)
RBC # BLD: 2.92 M/UL — LOW (ref 3.8–5.2)
RBC # FLD: 15.7 % — HIGH (ref 10.3–14.5)
RBC BLD AUTO: SIGNIFICANT CHANGE UP
SODIUM SERPL-SCNC: 138 MMOL/L — SIGNIFICANT CHANGE UP (ref 135–145)
SODIUM SERPL-SCNC: 139 MMOL/L — SIGNIFICANT CHANGE UP (ref 135–145)
TOXIC GRANULES BLD QL SMEAR: PRESENT — SIGNIFICANT CHANGE UP
UUN UR-MCNC: 696 MG/DL — SIGNIFICANT CHANGE UP
WBC # BLD: 3.94 K/UL — SIGNIFICANT CHANGE UP (ref 3.8–10.5)
WBC # FLD AUTO: 3.94 K/UL — SIGNIFICANT CHANGE UP (ref 3.8–10.5)

## 2024-02-14 PROCEDURE — 99233 SBSQ HOSP IP/OBS HIGH 50: CPT

## 2024-02-14 PROCEDURE — 99223 1ST HOSP IP/OBS HIGH 75: CPT | Mod: GC

## 2024-02-14 RX ORDER — POTASSIUM PHOSPHATE, MONOBASIC POTASSIUM PHOSPHATE, DIBASIC 236; 224 MG/ML; MG/ML
15 INJECTION, SOLUTION INTRAVENOUS ONCE
Refills: 0 | Status: COMPLETED | OUTPATIENT
Start: 2024-02-14 | End: 2024-02-14

## 2024-02-14 RX ORDER — ASCORBIC ACID 60 MG
500 TABLET,CHEWABLE ORAL DAILY
Refills: 0 | Status: DISCONTINUED | OUTPATIENT
Start: 2024-02-14 | End: 2024-02-20

## 2024-02-14 RX ORDER — SODIUM,POTASSIUM PHOSPHATES 278-250MG
1 POWDER IN PACKET (EA) ORAL ONCE
Refills: 0 | Status: COMPLETED | OUTPATIENT
Start: 2024-02-14 | End: 2024-02-14

## 2024-02-14 RX ORDER — CARVEDILOL PHOSPHATE 80 MG/1
6.25 CAPSULE, EXTENDED RELEASE ORAL EVERY 12 HOURS
Refills: 0 | Status: DISCONTINUED | OUTPATIENT
Start: 2024-02-14 | End: 2024-02-20

## 2024-02-14 RX ORDER — MAGNESIUM SULFATE 500 MG/ML
2 VIAL (ML) INJECTION ONCE
Refills: 0 | Status: COMPLETED | OUTPATIENT
Start: 2024-02-14 | End: 2024-02-14

## 2024-02-14 RX ORDER — CALCIUM GLUCONATE 100 MG/ML
1 VIAL (ML) INTRAVENOUS ONCE
Refills: 0 | Status: COMPLETED | OUTPATIENT
Start: 2024-02-14 | End: 2024-02-14

## 2024-02-14 RX ORDER — MAGNESIUM OXIDE 400 MG ORAL TABLET 241.3 MG
400 TABLET ORAL DAILY
Refills: 0 | Status: DISCONTINUED | OUTPATIENT
Start: 2024-02-14 | End: 2024-02-16

## 2024-02-14 RX ORDER — CHLORHEXIDINE GLUCONATE 213 G/1000ML
1 SOLUTION TOPICAL
Refills: 0 | Status: DISCONTINUED | OUTPATIENT
Start: 2024-02-14 | End: 2024-02-20

## 2024-02-14 RX ORDER — POTASSIUM CHLORIDE 20 MEQ
40 PACKET (EA) ORAL EVERY 4 HOURS
Refills: 0 | Status: COMPLETED | OUTPATIENT
Start: 2024-02-14 | End: 2024-02-14

## 2024-02-14 RX ORDER — POTASSIUM CHLORIDE 20 MEQ
10 PACKET (EA) ORAL
Refills: 0 | Status: COMPLETED | OUTPATIENT
Start: 2024-02-14 | End: 2024-02-14

## 2024-02-14 RX ORDER — THIAMINE MONONITRATE (VIT B1) 100 MG
100 TABLET ORAL DAILY
Refills: 0 | Status: DISCONTINUED | OUTPATIENT
Start: 2024-02-14 | End: 2024-02-20

## 2024-02-14 RX ADMIN — Medication 25 GRAM(S): at 13:30

## 2024-02-14 RX ADMIN — Medication 100 MILLIEQUIVALENT(S): at 08:40

## 2024-02-14 RX ADMIN — Medication 100 GRAM(S): at 13:47

## 2024-02-14 RX ADMIN — POTASSIUM PHOSPHATE, MONOBASIC POTASSIUM PHOSPHATE, DIBASIC 62.5 MILLIMOLE(S): 236; 224 INJECTION, SOLUTION INTRAVENOUS at 17:24

## 2024-02-14 RX ADMIN — Medication 100 MILLIEQUIVALENT(S): at 11:44

## 2024-02-14 RX ADMIN — CHLORHEXIDINE GLUCONATE 1 APPLICATION(S): 213 SOLUTION TOPICAL at 17:24

## 2024-02-14 RX ADMIN — Medication 100 MILLIEQUIVALENT(S): at 10:27

## 2024-02-14 RX ADMIN — Medication 40 MILLIEQUIVALENT(S): at 18:26

## 2024-02-14 RX ADMIN — Medication 40 MILLIEQUIVALENT(S): at 13:25

## 2024-02-14 RX ADMIN — Medication 1 PACKET(S): at 13:30

## 2024-02-14 RX ADMIN — MAGNESIUM OXIDE 400 MG ORAL TABLET 400 MILLIGRAM(S): 241.3 TABLET ORAL at 13:25

## 2024-02-14 NOTE — CONSULT NOTE ADULT - PROBLEM SELECTOR RECOMMENDATION 9
Currently with hypomag, hypoK, hypophosphatemia as well as hypocalcemia. This is a patient on active chemotherapy starting in Oct 2023 with her last chemotherapy session on 2/2/2024. Her initial presentation was for syncope with initial labs noted with what appears to be prerenal azotemia (responded to fluids as well as holding her home hctz. She said prior to her presentation, she was experiencing dizziness with poor PO intake due to lack of appetite. She now says her dizziness has resolved after fluid resuscitation and her appetite has improved during hospital admission. Overall, this appears to be consistent with refeeding syndrome.  - Continue to monitor bmp, mag, phos. Replenish as appropriate  - In regards to her hypocalcemia, can eval with PTH, Vitamin D-25. Suspect her hypocalcemia may be secondary to vitamin D deficiency given her clinical picture. Currently with hypomag, hypoK, hypophosphatemia as well as hypocalcemia. This is a patient on active chemotherapy starting in Oct 2023 with her last chemotherapy session on 2/2/2024. Her initial presentation was for syncope with initial labs noted with what appears to be prerenal azotemia (responded to fluids as well as holding her home hctz. She said prior to her presentation, she was experiencing dizziness with poor PO intake due to lack of appetite. She now says her dizziness has resolved after fluid resuscitation and her appetite has improved during hospital admission. Overall, this appears to be consistent with poor PO intake/refeeding syndrome as well as diuretic use.  - Continue to monitor bmp, mag, phos. Replenish as appropriate  - In regards to her hypocalcemia, can eval with PTH, Vitamin D-25. Suspect her hypocalcemia may be secondary to vitamin D deficiency given her clinical picture. Currently with hypomag, hypoK, hypophosphatemia as well as hypocalcemia. This is a patient on active chemotherapy starting in Oct 2023 with her last chemotherapy session on 2/2/2024. Her initial presentation was for syncope with initial labs noted with what appears to be prerenal azotemia (responded to fluids as well as holding her home hctz. She said prior to her presentation, she was experiencing dizziness with poor PO intake due to lack of appetite. She now says her dizziness has resolved after fluid resuscitation and her appetite has improved during hospital admission. Overall, this appears to be consistent with poor PO intake/refeeding syndrome as well as diuretic use.  - Continue to monitor bmp, mag, phos. Replenish as appropriate  - In regards to her hypocalcemia, can eval with PTH, Vitamin D-25, Vitamin D-1,25. Suspect her hypocalcemia may be secondary to vitamin D deficiency given her clinical picture. Currently with hypomag, hypoK, hypophosphatemia as well as hypocalcemia. This is a patient on active chemotherapy starting in Oct 2023 with her last chemotherapy session on 2/2/2024. Her initial presentation was for syncope with initial labs noted with what appears to be prerenal azotemia (responded to fluids as well as holding her home hctz. She said prior to her presentation, she was experiencing dizziness with poor PO intake due to lack of appetite. She now says her dizziness has resolved after fluid resuscitation and her appetite has improved during hospital admission. Overall, this appears to be consistent with poor PO intake/refeeding syndrome as well as diuretic use.  - Continue to monitor bmp, mag, phos. Replenish as appropriate preferably orally given she is able to now tolerate PO intake.  - In regards to her hypocalcemia, can eval with PTH, Vitamin D-25, Vitamin D-1,25. Suspect her hypocalcemia may be secondary to vitamin D deficiency given her clinical picture.

## 2024-02-14 NOTE — CONSULT NOTE ADULT - SUBJECTIVE AND OBJECTIVE BOX
NEPHROLOGY CONSULTATION NOTE    Chief Complaint:  HPI: Patient is a 73y Female whom presented to the hospital with     PAST MEDICAL & SURGICAL HISTORY:  Essential hypertension      Breast cancer      At risk for infection due to chemotherapy      Carcinoma of breast treated with adjuvant chemotherapy        Allergies:  No Known Allergies    Home Medications Reviewed  Hospital Medications:   MEDICATIONS  (STANDING):  calcium gluconate IVPB 1 Gram(s) IV Intermittent once  magnesium oxide 400 milliGRAM(s) Oral daily  magnesium sulfate  IVPB 2 Gram(s) IV Intermittent once  potassium chloride    Tablet ER 40 milliEquivalent(s) Oral every 4 hours  potassium chloride  10 mEq/100 mL IVPB 10 milliEquivalent(s) IV Intermittent every 1 hour  potassium phosphate / sodium phosphate Powder (PHOS-NaK) 1 Packet(s) Oral once  potassium phosphate IVPB 15 milliMole(s) IV Intermittent once  sodium chloride 0.9% with potassium chloride 20 mEq/L 1000 milliLiter(s) (100 mL/Hr) IV Continuous <Continuous>    SOCIAL HISTORY:  Denies ETOH,Smoking,   FAMILY HISTORY:      REVIEW OF SYSTEMS:  CONSTITUTIONAL: No weakness, fevers or chills  EYES/ENT: No visual changes;  No vertigo or throat pain   NECK: No pain or stiffness  RESPIRATORY: No cough, wheezing, hemoptysis; No shortness of breath  CARDIOVASCULAR: No chest pain or palpitations.  GASTROINTESTINAL: No abdominal or epigastric pain. No nausea, vomiting, or hematemesis; No diarrhea or constipation. No melena or hematochezia.  GENITOURINARY: No dysuria, frequency, foamy urine, urinary urgency, incontinence or hematuria  NEUROLOGICAL: No numbness or weakness  SKIN: No itching, burning, rashes, or lesions   VASCULAR: No bilateral lower extremity edema.   All other review of systems is negative unless indicated above.    VITALS:  Vital Signs Last 24 Hrs  T(C): 36.6 (14 Feb 2024 04:45), Max: 36.8 (13 Feb 2024 18:45)  T(F): 97.9 (14 Feb 2024 04:45), Max: 98.2 (13 Feb 2024 18:45)  HR: 88 (14 Feb 2024 04:45) (83 - 102)  BP: 124/78 (14 Feb 2024 04:45) (112/71 - 149/78)  BP(mean): --  RR: 18 (14 Feb 2024 04:45) (18 - 18)  SpO2: 95% (14 Feb 2024 04:45) (92% - 99%)    Parameters below as of 14 Feb 2024 04:45  Patient On (Oxygen Delivery Method): room air        02-13 @ 07:01  -  02-14 @ 07:00  --------------------------------------------------------  IN: 250 mL / OUT: 1025 mL / NET: -775 mL        PHYSICAL EXAM:  Constitutional: NAD  HEENT: anicteric sclera, oropharynx clear, MMM  Neck: No JVD  Respiratory: CTAB, no wheezes, rales or rhonchi  Cardiovascular: S1, S2, RRR  Gastrointestinal: BS+, soft, NT/ND  Extremities: No cyanosis or clubbing. No peripheral edema  Neurological: A/O x 3, no focal deficits  Psychiatric: Normal mood, normal affect  : No CVA tenderness. No martinez.   Skin: No rashes  Vascular Access:    LABS:  02-14    139  |  100  |  22  ----------------------------<  81  2.8<LL>   |  26  |  0.47<L>    Ca    6.4<LL>      14 Feb 2024 06:42  Phos  1.6     02-14  Mg     1.1     02-14    TPro  5.4<L>  /  Alb  2.6<L>  /  TBili  0.8  /  DBili      /  AST  51<H>  /  ALT  110<H>  /  AlkPhos  84  02-14    Creatinine Trend: 0.47 <--, 0.76 <--, 1.11 <--                        8.8    3.94  )-----------( 49       ( 14 Feb 2024 06:44 )             26.0     Urine Studies:  Urinalysis Basic - ( 14 Feb 2024 06:42 )    Color:  / Appearance:  / SG:  / pH:   Gluc: 81 mg/dL / Ketone:   / Bili:  / Urobili:    Blood:  / Protein:  / Nitrite:    Leuk Esterase:  / RBC:  / WBC    Sq Epi:  / Non Sq Epi:  / Bacteria:       Sodium, Random Urine: 91 mmol/L (02-13 @ 21:25)  Creatinine, Random Urine: 54 mg/dL (02-13 @ 21:25)  Protein/Creatinine Ratio Calculation: 1.1 Ratio (02-13 @ 21:25)  Osmolality, Random Urine: 552 mos/kg (02-13 @ 21:25)  Potassium, Random Urine: 35 mmol/L (02-13 @ 21:25)            RADIOLOGY & ADDITIONAL STUDIES:                 NEPHROLOGY CONSULTATION NOTE    Chief Complaint:  HPI: 74yo f w pmh htn, breast ca, p/w generalized fatigue/weakness (x1 week in duration, a/w decreased po intake, n+v+d) leading to fall; unwitnessed, +head strike, not on blood thinners, unclear whether a/w loc; patient's family member found patient on floor early this morning; contacted outpatient provider who recommended patient go to er for further evaluation. reportedly, patient recently underwent chemo for her breast ca. in er, found to have multiple metabolic disturbances suggestive of hypovolemia; admit to medicine for further mgmt.    Nephrology consulted for electrolyte abnormalities (hypokalemia, hypomagnesemia, hypophosphatemia, hypocalcemia) despite replenishment. Per patient, denies nausea/vomiting, diarrhea different to baseline both currently and prior to admission. However, she endorses a lack of appetite with poor PO intake of solids and mostly liquid diet. She denies any urinary symptoms. She is unsure what her urine currently looks like as she hasn't been looking at it during this admission    PAST MEDICAL & SURGICAL HISTORY:  Essential hypertension      Breast cancer      At risk for infection due to chemotherapy      Carcinoma of breast treated with adjuvant chemotherapy        Allergies:  No Known Allergies    Home Medications Reviewed  Hospital Medications:   MEDICATIONS  (STANDING):  calcium gluconate IVPB 1 Gram(s) IV Intermittent once  magnesium oxide 400 milliGRAM(s) Oral daily  magnesium sulfate  IVPB 2 Gram(s) IV Intermittent once  potassium chloride    Tablet ER 40 milliEquivalent(s) Oral every 4 hours  potassium chloride  10 mEq/100 mL IVPB 10 milliEquivalent(s) IV Intermittent every 1 hour  potassium phosphate / sodium phosphate Powder (PHOS-NaK) 1 Packet(s) Oral once  potassium phosphate IVPB 15 milliMole(s) IV Intermittent once  sodium chloride 0.9% with potassium chloride 20 mEq/L 1000 milliLiter(s) (100 mL/Hr) IV Continuous <Continuous>    SOCIAL HISTORY:  Denies ETOH,Smoking,   FAMILY HISTORY:      REVIEW OF SYSTEMS:  CONSTITUTIONAL: No weakness, fevers or chills  EYES/ENT: No visual changes;  No vertigo or throat pain   NECK: No pain or stiffness  RESPIRATORY: No cough, wheezing, hemoptysis; No shortness of breath  CARDIOVASCULAR: No chest pain or palpitations.  GASTROINTESTINAL: No abdominal or epigastric pain. No nausea, vomiting, or hematemesis; No diarrhea or constipation. No melena or hematochezia.  GENITOURINARY: No dysuria, frequency, foamy urine, urinary urgency, incontinence or hematuria  NEUROLOGICAL: No numbness or weakness  SKIN: No itching, burning, rashes, or lesions   VASCULAR: No bilateral lower extremity edema.   All other review of systems is negative unless indicated above.    VITALS:  Vital Signs Last 24 Hrs  T(C): 36.6 (14 Feb 2024 04:45), Max: 36.8 (13 Feb 2024 18:45)  T(F): 97.9 (14 Feb 2024 04:45), Max: 98.2 (13 Feb 2024 18:45)  HR: 88 (14 Feb 2024 04:45) (83 - 102)  BP: 124/78 (14 Feb 2024 04:45) (112/71 - 149/78)  BP(mean): --  RR: 18 (14 Feb 2024 04:45) (18 - 18)  SpO2: 95% (14 Feb 2024 04:45) (92% - 99%)    Parameters below as of 14 Feb 2024 04:45  Patient On (Oxygen Delivery Method): room air        02-13 @ 07:01  -  02-14 @ 07:00  --------------------------------------------------------  IN: 250 mL / OUT: 1025 mL / NET: -775 mL        PHYSICAL EXAM:  Constitutional: NAD, cachetic  HEENT: anicteric sclera, oropharynx clear, MMM  Neck: No JVD  Respiratory: CTAB, no wheezes, rales or rhonchi  Cardiovascular: S1, S2, RRR  Gastrointestinal: BS+, soft, NT/ND  Extremities: No cyanosis or clubbing. No peripheral edema  Neurological: A/O x 3, no focal deficits  Psychiatric: Normal mood, normal affect  : No CVA tenderness. No martinez.   Skin: No rashes  Vascular Access:    LABS:  02-14    139  |  100  |  22  ----------------------------<  81  2.8<LL>   |  26  |  0.47<L>    Ca    6.4<LL>      14 Feb 2024 06:42  Phos  1.6     02-14  Mg     1.1     02-14    TPro  5.4<L>  /  Alb  2.6<L>  /  TBili  0.8  /  DBili      /  AST  51<H>  /  ALT  110<H>  /  AlkPhos  84  02-14    Creatinine Trend: 0.47 <--, 0.76 <--, 1.11 <--                        8.8    3.94  )-----------( 49       ( 14 Feb 2024 06:44 )             26.0     Urine Studies:  Urinalysis Basic - ( 14 Feb 2024 06:42 )    Color:  / Appearance:  / SG:  / pH:   Gluc: 81 mg/dL / Ketone:   / Bili:  / Urobili:    Blood:  / Protein:  / Nitrite:    Leuk Esterase:  / RBC:  / WBC    Sq Epi:  / Non Sq Epi:  / Bacteria:       Sodium, Random Urine: 91 mmol/L (02-13 @ 21:25)  Creatinine, Random Urine: 54 mg/dL (02-13 @ 21:25)  Protein/Creatinine Ratio Calculation: 1.1 Ratio (02-13 @ 21:25)  Osmolality, Random Urine: 552 mos/kg (02-13 @ 21:25)  Potassium, Random Urine: 35 mmol/L (02-13 @ 21:25)            RADIOLOGY & ADDITIONAL STUDIES:

## 2024-02-14 NOTE — CONSULT NOTE ADULT - ATTENDING COMMENTS
74yo f w pmh htn, breast ca, p/w generalized fatigue/weakness (x1 week in duration, a/w decreased po intake, n+v+d) leading to fall; unwitnessed, +head strike, not on blood thinners, unclear whether a/w loc; patient's family member found patient on floor early this morning; contacted outpatient provider who recommended patient go to er for further evaluation. reportedly, patient recently underwent chemo for her breast ca. in er, found to have multiple metabolic disturbances suggestive of hypovolemia; admit to medicine for further mgmt. Seen at bedside with .  Alert, conversant, IV infusing, taking PO.    Acknowledged course including diarrhea associated with recent chemo rx.  1.  Hypomagnesemia--may be associated with low K and Ca.  aggressive rx  2.  Hypokalemia--telemetry.  PO>IV repletion  3.  Hypocalcemia--may relate to 1 related effects on PTH.  Check PTH, vitamin D  4.  Volume depletion--poor PO intake (reflected in initial starvation ketosis.  Volume optimize IV NaCl, PO.  No evidence of overload.    5.  Hypophosphatemia--replete and can get lower with refeeding.  Check Vit D.  Improved nutrition    discussed with med team  Newton Christianson MD  contact me on TEAMS

## 2024-02-14 NOTE — DIETITIAN INITIAL EVALUATION ADULT - REASON INDICATOR FOR ASSESSMENT
RD consult for Pressure Injury Stage 2 or >.   Source: pt, pt's  at bedside, medical record. Chart reviewed, events noted.

## 2024-02-14 NOTE — DIETITIAN INITIAL EVALUATION ADULT - NSFNSGIIOFT_GEN_A_CORE
Denies nausea, vomiting, constipation, diarrhea. Reports last BM 2/13. Pt not currently ordered for bowel regimen.

## 2024-02-14 NOTE — DIETITIAN INITIAL EVALUATION ADULT - SIGNS/SYMPTOMS
</=50% EER >/= 5 days, severe muscle/fat depletion, >20% wt loss x 1 year, BMI <19 pt with breast cancer on chemotherapy, and possible pressure injury

## 2024-02-14 NOTE — PROGRESS NOTE ADULT - SUBJECTIVE AND OBJECTIVE BOX
Patient is a 73y old  Female who presents with a chief complaint of generalized fatigue/weakness (14 Feb 2024 09:52)    Patient seen and examined.  No new complaints reported.    MEDICATIONS  (STANDING):  calcium gluconate IVPB 1 Gram(s) IV Intermittent once  magnesium oxide 400 milliGRAM(s) Oral daily  magnesium sulfate  IVPB 2 Gram(s) IV Intermittent once  potassium chloride    Tablet ER 40 milliEquivalent(s) Oral every 4 hours  potassium chloride  10 mEq/100 mL IVPB 10 milliEquivalent(s) IV Intermittent every 1 hour  potassium phosphate / sodium phosphate Powder (PHOS-NaK) 1 Packet(s) Oral once  potassium phosphate IVPB 15 milliMole(s) IV Intermittent once  sodium chloride 0.9% with potassium chloride 20 mEq/L 1000 milliLiter(s) (100 mL/Hr) IV Continuous <Continuous>    MEDICATIONS  (PRN):  acetaminophen     Tablet .. 650 milliGRAM(s) Oral every 6 hours PRN Temp greater or equal to 38C (100.4F), Mild Pain (1 - 3)  aluminum hydroxide/magnesium hydroxide/simethicone Suspension 30 milliLiter(s) Oral every 4 hours PRN Dyspepsia  melatonin 3 milliGRAM(s) Oral at bedtime PRN Insomnia  ondansetron Injectable 4 milliGRAM(s) IV Push every 8 hours PRN Nausea and/or Vomiting      Vital Signs Last 24 Hrs  T(C): 36.5 (14 Feb 2024 11:19), Max: 36.8 (13 Feb 2024 18:45)  T(F): 97.7 (14 Feb 2024 11:19), Max: 98.2 (13 Feb 2024 18:45)  HR: 95 (14 Feb 2024 11:19) (83 - 95)  BP: 134/90 (14 Feb 2024 11:19) (112/71 - 141/91)  BP(mean): --  RR: 18 (14 Feb 2024 11:19) (18 - 18)  SpO2: 95% (14 Feb 2024 11:19) (92% - 97%)    Parameters below as of 14 Feb 2024 11:19  Patient On (Oxygen Delivery Method): room air        PE  frail  Awake, alert  Anicteric, MMM  RRR  CTAB  Abd soft, NT, ND  No c/c                        8.8    3.94  )-----------( 49       ( 14 Feb 2024 06:44 )             26.0       02-14    139  |  100  |  22  ----------------------------<  81  2.8<LL>   |  26  |  0.47<L>    Ca    6.4<LL>      14 Feb 2024 06:42  Phos  1.6     02-14  Mg     1.1     02-14    TPro  5.4<L>  /  Alb  2.6<L>  /  TBili  0.8  /  DBili  x   /  AST  51<H>  /  ALT  110<H>  /  AlkPhos  84  02-14

## 2024-02-14 NOTE — ADVANCED PRACTICE NURSE CONSULT - REASON FOR CONSULT
Requested by staff to assess skin status: sacrum and b/l buttocks. PMH is noted:  Reason for Admission: generalized fatigue/weakness  History of Present Illness:   72yo f w pmh htn, breast ca, p/w generalized fatigue/weakness (x1 week in duration, a/w decreased po intake, n+v+d) leading to fall; unwitnessed, +head strike, not on blood thinners, unclear whether a/w loc; patient's family member found patient on floor early this morning; contacted outpatient provider who recommended patient go to er for further evaluation. reportedly, patient recently underwent chemo for her breast ca. in er, found to have multiple metabolic disturbances suggestive of hypovolemia; admit to medicine for further mgmt

## 2024-02-14 NOTE — DIETITIAN INITIAL EVALUATION ADULT - ADD RECOMMEND
1) Continue Regular diet.   2) Concern for *REFEEDING SYNDROME*. **RECOMMEND a multivitamin and thiamine, pending no medical contraindications, and continue to replete K+, Phos, and Mg aggressively PRN. Consider Vitamin C for wound healing as well if not contraindicated  3) Recommend Ensure Plus High Protein 1x/day and Liquid Protein Supplement 1x/day.  4) Monitor PO intake, GI tolerance, skin integrity, labs, weight, and bowel movement regularity.   5) Honor food preferences as feasible. Assist with meals PRN and encourage PO intake.  6) RD remains available upon request and will follow-up per protocol.  7) malnutrition/BMI <19 alert placed in chart

## 2024-02-14 NOTE — PROGRESS NOTE ADULT - ASSESSMENT
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  --CT cervical spine: No acute fracture or traumatic subluxation.  --on tele    Electrolyte depletions   --replete as needed  --2/2 N/V/D    Poor PO intake  --consider dronabinol, palliative care consult and/or nutrition consult to help increase calorie intake  --PRN anti-emetics      will follow,    Romy King NP  Hematology/ Oncology  [facility]  [phone] (office)  [phone] (alt office)  Evenings and weekends please call MD on call or office

## 2024-02-14 NOTE — DIETITIAN INITIAL EVALUATION ADULT - REASON FOR ADMISSION
Precipitous drop in hematocrit    Per chart, pt is a 73 year old female with PMH of htn, breast ca, p/w generalized fatigue/weakness leading to fall, unclear etiology; in er, found to have multiple metabolic disturbances suggestive of hypovolemia; admit to medicine for further mgmt

## 2024-02-14 NOTE — DIETITIAN INITIAL EVALUATION ADULT - PERSON TAUGHT/METHOD
Discussed importance of adequate protein-energy consumption to meet estimated nutrient needs. Encouraged slow and steady increase in her intake of meals and oral nutrition supplements due to concern for refeeding syndrome. Encouraged intake of protein-rich foods first at mealtimes to help preserve lean muscle mass and aid in wound healing. Reviewed food choices that are high in protein. Pt/spouse noted with good comprehension and made aware RD remains available for further questions/concerns./verbal instruction/patient instructed/spouse instructed

## 2024-02-14 NOTE — ADVANCED PRACTICE NURSE CONSULT - RECOMMEDATIONS
Will recommend the followin. Sacrum b/l buttocks: continue to monitor, Brielle Antifungal twice daily and prn for incontinence  2. continue to encourage mobility, T&P  3. Off-load heels with boots/cushion  4. Seat cushion when OOB to chair  5. nutrition support as pt condition allows  Tx plan discussed with pt/RN

## 2024-02-14 NOTE — DIETITIAN INITIAL EVALUATION ADULT - WEIGHT USED FOR CALCULATIONS
current weight [Initial Evaluation] : an initial evaluation [Patient] : patient [Father] : father [FreeTextEntry1] : Right 5th finger Pain

## 2024-02-14 NOTE — ADVANCED PRACTICE NURSE CONSULT - ASSESSMENT
The pt was encountered on [employer]- Mrs Gaffney was awake and alert and engaging in conversation. She is on a [employer] P 500 support surface and needs assistance with T&P. Will recommend boots/cushion for off-loading the heels.  As the pt was a/w skin changes a nutrition consult is pending for further evaluation.  Upon assessment, the staff had applied a Pure-wick catheter to divert urine from the skin. the pt reports that she wears a diaper at home. on the sacrum and b/l buttocks are skin changes consistent with deep tissue injury versus IAD with a fungal component. The skin is hyperpigmented and there are resolving satellite lesions.  Will recommended to continue to monitor and to apply Brielle antifungal with pericare.   I provided education to the pt regarding her skin and the tx- I also talked about PI prevention The pt was encountered on [employer]- Mrs Gaffney was awake and alert and engaging in conversation. She is on a [employer] P 500 support surface and needs assistance with T&P. Will recommend boots/cushion for off-loading the heels.  As the pt was a/w skin changes a nutrition consult is pending for further evaluation.  Upon assessment, the staff had applied a Pure-wick catheter to divert urine from the skin. the pt reports that she wears a diaper at home. on the sacrum and b/l buttocks are skin changes consistent with deep tissue injury versus IAD with a fungal component. The skin is hyperpigmented and there are resolving satellite lesions.  Will recommended to continue to monitor and to apply Brielle antifungal with pericare. The area in question measures 13cmx 9cm x0cm.  I provided education to the pt regarding her skin and the tx- I also talked about PI prevention

## 2024-02-14 NOTE — DIETITIAN INITIAL EVALUATION ADULT - ORAL INTAKE PTA/DIET HISTORY
Pt reports decreased appetite/PO intake starting 2/9, mostly taking liquids and not so much solid food. Doesn't follow any therapeutic diet at baseline.   Confirms NKFA.

## 2024-02-14 NOTE — DIETITIAN INITIAL EVALUATION ADULT - PERTINENT MEDS FT
MEDICATIONS  (STANDING):  magnesium oxide 400 milliGRAM(s) Oral daily  potassium chloride    Tablet ER 40 milliEquivalent(s) Oral every 4 hours  potassium phosphate IVPB 15 milliMole(s) IV Intermittent once  sodium chloride 0.9% with potassium chloride 20 mEq/L 1000 milliLiter(s) (100 mL/Hr) IV Continuous <Continuous>    MEDICATIONS  (PRN):  acetaminophen     Tablet .. 650 milliGRAM(s) Oral every 6 hours PRN Temp greater or equal to 38C (100.4F), Mild Pain (1 - 3)  aluminum hydroxide/magnesium hydroxide/simethicone Suspension 30 milliLiter(s) Oral every 4 hours PRN Dyspepsia  melatonin 3 milliGRAM(s) Oral at bedtime PRN Insomnia  ondansetron Injectable 4 milliGRAM(s) IV Push every 8 hours PRN Nausea and/or Vomiting

## 2024-02-14 NOTE — DIETITIAN INITIAL EVALUATION ADULT - OTHER INFO
Reports  lbs ~1 year ago. Endorses wt loss over the past year, but is unsure of current weight or amount of weight loss.  Dosing wt/ht not in chart at this time. Reports height is 5'4".  Wt history per chart: 104.5 lbs (02-14, RD obtained bedscale wt). Suggests 21% wt loss x 1 year - clinically significant. RD to continue to monitor weight trends as able/available.     Additional nutrition-related concerns:  - breast cancer on chemotherapy.  - Concern for refeeding syndrome in setting of recent poor PO intake, low weight, and electrolyte abnormalities with low K+, Phos, Mg. K+, all of which were low today. Nephrology consulted - note that hypocalcemia may be secondary to Vitamin D deficiency. Pt receiving vitamin/mineral repletion PRN.

## 2024-02-14 NOTE — DIETITIAN INITIAL EVALUATION ADULT - OTHER CALCULATIONS
Fluid needs deferred to team. Energy and protein needs based on bedscale weight obtained by RD: 104.5 lbs/47.4 kg (02-14)

## 2024-02-14 NOTE — DIETITIAN INITIAL EVALUATION ADULT - NSFNSPHYEXAMSKINFT_GEN_A_CORE
Pressure Injury 1: BLL buttock/ sacrum , Suspected deep tissue injury  Pt seen by wound care 2/14, noted with sacrum/bilateral buttocks deep tissue injury vs IAD

## 2024-02-14 NOTE — DIETITIAN INITIAL EVALUATION ADULT - PROBLEM SELECTOR PLAN 2
hypoNa, hypoCl, hypoK, hypoMg + hypoP, HAGMA, hypoCa (despite correction w albumin), marissa w bun/cr > 20:1; consistent with dehydration + hypovolemia  s/p 1 L NS + ca glu, mgso4, mgo, kcl  follow up urine studies; abg/vbg; lactic acid + bhb; gi pcr + fecal calprotectin  Monitor I/Os, daily weights, UO, BUN/Cr, volume status, acid-base balance, electrolytes (na, k, ca, mg, p)  hold home hctz  avoid nephrotoxic agents; appropriate dose adjustments for all renally cleared medications   encourage po intake; IVF resusci + electrolyte supplementation as needed in the mean time

## 2024-02-14 NOTE — PROGRESS NOTE ADULT - PROBLEM SELECTOR PLAN 1
trauma work up with no acute significant findings  neuroimaging with no acute significant findings  ekg with no new st seg - t wave changes suggestive of acute ischemia - shows sinus tach  trop 77->67, suspect demand and/or decreased clearance  -TTE reassuring, EF 54% and no wall motion abnormalities.   follow up tsh, folate, b12, rpr; orthostatic vitals, tte; ua +/- uc, rvp + cxr; ruq us + hepatitis panel  frequent neurochecks  monitor on telemetry  maintain fall + frac, delirium, seizure, aspiration precautions; keep head end of bed elevated  nutrition consult appreciated.   Consider dronabinol for poor po intake.   pt/ot eval + sw/cm consult for disposition

## 2024-02-14 NOTE — PROGRESS NOTE ADULT - SUBJECTIVE AND OBJECTIVE BOX
Patient is a 73y old  Female who presents with a chief complaint of Precipitous drop in hematocrit    Per chart, pt is a 73 year old female with PMH of htn, breast ca, p/w generalized fatigue/weakness leading to fall, unclear etiology; in er, found to have multiple metabolic disturbances suggestive of hypovolemia; admit to medicine for further mgmt    (14 Feb 2024 15:04)        SUBJECTIVE / OVERNIGHT EVENTS: feels better today. denies diarrhea.      MEDICATIONS  (STANDING):  chlorhexidine 2% Cloths 1 Application(s) Topical <User Schedule>  magnesium oxide 400 milliGRAM(s) Oral daily  sodium chloride 0.9% with potassium chloride 20 mEq/L 1000 milliLiter(s) (100 mL/Hr) IV Continuous <Continuous>    MEDICATIONS  (PRN):  acetaminophen     Tablet .. 650 milliGRAM(s) Oral every 6 hours PRN Temp greater or equal to 38C (100.4F), Mild Pain (1 - 3)  aluminum hydroxide/magnesium hydroxide/simethicone Suspension 30 milliLiter(s) Oral every 4 hours PRN Dyspepsia  melatonin 3 milliGRAM(s) Oral at bedtime PRN Insomnia  ondansetron Injectable 4 milliGRAM(s) IV Push every 8 hours PRN Nausea and/or Vomiting      Vital Signs Last 24 Hrs  T(C): 36.5 (14 Feb 2024 11:19), Max: 36.8 (13 Feb 2024 18:45)  T(F): 97.7 (14 Feb 2024 11:19), Max: 98.2 (13 Feb 2024 18:45)  HR: 99 (14 Feb 2024 16:36) (83 - 99)  BP: 138/88 (14 Feb 2024 16:36) (113/71 - 141/91)  BP(mean): --  RR: 18 (14 Feb 2024 11:19) (18 - 18)  SpO2: 95% (14 Feb 2024 11:19) (92% - 96%)    Parameters below as of 14 Feb 2024 11:19  Patient On (Oxygen Delivery Method): room air      CAPILLARY BLOOD GLUCOSE        I&O's Summary    13 Feb 2024 07:01  -  14 Feb 2024 07:00  --------------------------------------------------------  IN: 250 mL / OUT: 1025 mL / NET: -775 mL    14 Feb 2024 07:01  -  14 Feb 2024 18:40  --------------------------------------------------------  IN: 480 mL / OUT: 100 mL / NET: 380 mL          PHYSICAL EXAM:   GENERAL: NAD,thin  HEAD:  Atraumatic, Normocephalic  EYES:  conjunctiva and sclera clear  NECK: Supple   CHEST/LUNG: Clear to auscultation bilaterally; No wheeze  HEART: S1S2 normal. Regular rate and rhythm; No murmurs, rubs, or gallops  ABDOMEN: Soft, Nontender, Nondistended; Bowel sounds present  EXTREMITIES:   No clubbing, cyanosis, or edema  PSYCH/Neuro: AAOx3. Non-focal.   SKIN: No rashes or lesions      LABS:                        8.8    3.94  )-----------( 49       ( 14 Feb 2024 06:44 )             26.0     02-14    139  |  100  |  22  ----------------------------<  81  2.8<LL>   |  26  |  0.47<L>    Ca    6.4<LL>      14 Feb 2024 06:42  Phos  1.6     02-14  Mg     1.1     02-14    TPro  5.4<L>  /  Alb  2.6<L>  /  TBili  0.8  /  DBili  x   /  AST  51<H>  /  ALT  110<H>  /  AlkPhos  84  02-14    PT/INR - ( 13 Feb 2024 06:35 )   PT: 14.7 sec;   INR: 1.41 ratio         PTT - ( 13 Feb 2024 06:35 )  PTT:17.7 sec      Urinalysis Basic - ( 14 Feb 2024 06:42 )    Color: x / Appearance: x / SG: x / pH: x  Gluc: 81 mg/dL / Ketone: x  / Bili: x / Urobili: x   Blood: x / Protein: x / Nitrite: x   Leuk Esterase: x / RBC: x / WBC x   Sq Epi: x / Non Sq Epi: x / Bacteria: x          RADIOLOGY & ADDITIONAL TESTS:    Imaging Personally Reviewed:  Consultant(s) Notes Reviewed:  renal, heme/onco  Care Discussed with Consultants/Other Providers:

## 2024-02-14 NOTE — DIETITIAN INITIAL EVALUATION ADULT - PROBLEM SELECTOR PLAN 1
trauma work up with no acute significant findings  neuroimaging with no acute significant findings  ekg with no new st seg - t wave changes suggestive of acute ischemia - shows sinus tach  trop 77->67, suspect demand and/or decreased clearance  follow up tsh, folate, b12, rpr; orthostatic vitals, tte; ua +/- uc, rvp + cxr; ruq us + hepatitis panel  frequent neurochecks  monitor on telemetry  maintain fall + frac, delirium, seizure, aspiration precautions; keep head end of bed elevated  nutrition consult  pt/ot eval + sw/cm consult for disposition

## 2024-02-14 NOTE — DIETITIAN INITIAL EVALUATION ADULT - ENERGY INTAKE
Pt reports much improved appetite/PO intake during admission, but still just eating some of the food. Is amenable to try oral nutrition supplements in-house.

## 2024-02-14 NOTE — DIETITIAN INITIAL EVALUATION ADULT - PERTINENT LABORATORY DATA
02-14    139  |  100  |  22  ----------------------------<  81  2.8<LL>   |  26  |  0.47<L>    Ca    6.4<LL>      14 Feb 2024 06:42  Phos  1.6     02-14  Mg     1.1     02-14    TPro  5.4<L>  /  Alb  2.6<L>  /  TBili  0.8  /  DBili  x   /  AST  51<H>  /  ALT  110<H>  /  AlkPhos  84  02-14  A1C with Estimated Average Glucose Result: 5.6 % (02-13-24 @ 06:35)

## 2024-02-14 NOTE — CONSULT NOTE ADULT - ASSESSMENT
72yo f w pmh htn, breast ca, p/w generalized fatigue/weakness leading to fall, unclear etiology; in er, found to have multiple electrolyte abnormalities. Nephro consulted for electrolyte abnormalities 72yo f w pmh htn, breast ca, p/w generalized fatigue/weakness leading to fall, unclear etiology; in er, found to have multiple electrolyte abnormalities in the setting of poor PO intake now with improved appetite/PO intake. Nephro consulted for electrolyte abnormalities

## 2024-02-15 LAB
24R-OH-CALCIDIOL SERPL-MCNC: 33.6 NG/ML — SIGNIFICANT CHANGE UP (ref 30–80)
ALBUMIN SERPL ELPH-MCNC: 2.5 G/DL — LOW (ref 3.3–5)
ALP SERPL-CCNC: 96 U/L — SIGNIFICANT CHANGE UP (ref 40–120)
ALT FLD-CCNC: 79 U/L — HIGH (ref 10–45)
ANION GAP SERPL CALC-SCNC: 10 MMOL/L — SIGNIFICANT CHANGE UP (ref 5–17)
ANION GAP SERPL CALC-SCNC: 9 MMOL/L — SIGNIFICANT CHANGE UP (ref 5–17)
AST SERPL-CCNC: 32 U/L — SIGNIFICANT CHANGE UP (ref 10–40)
BILIRUB SERPL-MCNC: 0.5 MG/DL — SIGNIFICANT CHANGE UP (ref 0.2–1.2)
BUN SERPL-MCNC: 13 MG/DL — SIGNIFICANT CHANGE UP (ref 7–23)
BUN SERPL-MCNC: 16 MG/DL — SIGNIFICANT CHANGE UP (ref 7–23)
CALCIUM SERPL-MCNC: 6.1 MG/DL — CRITICAL LOW (ref 8.4–10.5)
CALCIUM SERPL-MCNC: 6.5 MG/DL — CRITICAL LOW (ref 8.4–10.5)
CALCIUM SERPL-MCNC: 6.9 MG/DL — LOW (ref 8.4–10.5)
CHLORIDE SERPL-SCNC: 101 MMOL/L — SIGNIFICANT CHANGE UP (ref 96–108)
CHLORIDE SERPL-SCNC: 101 MMOL/L — SIGNIFICANT CHANGE UP (ref 96–108)
CO2 SERPL-SCNC: 26 MMOL/L — SIGNIFICANT CHANGE UP (ref 22–31)
CO2 SERPL-SCNC: 27 MMOL/L — SIGNIFICANT CHANGE UP (ref 22–31)
CREAT SERPL-MCNC: 0.41 MG/DL — LOW (ref 0.5–1.3)
CREAT SERPL-MCNC: 0.47 MG/DL — LOW (ref 0.5–1.3)
EGFR: 103 ML/MIN/1.73M2 — SIGNIFICANT CHANGE UP
EGFR: 99 ML/MIN/1.73M2 — SIGNIFICANT CHANGE UP
GI PCR PANEL: DETECTED
GLUCOSE SERPL-MCNC: 106 MG/DL — HIGH (ref 70–99)
GLUCOSE SERPL-MCNC: 96 MG/DL — SIGNIFICANT CHANGE UP (ref 70–99)
HCT VFR BLD CALC: 26 % — LOW (ref 34.5–45)
HGB BLD-MCNC: 8.8 G/DL — LOW (ref 11.5–15.5)
MAGNESIUM SERPL-MCNC: 1.2 MG/DL — LOW (ref 1.6–2.6)
MAGNESIUM SERPL-MCNC: 1.5 MG/DL — LOW (ref 1.6–2.6)
MCHC RBC-ENTMCNC: 30.3 PG — SIGNIFICANT CHANGE UP (ref 27–34)
MCHC RBC-ENTMCNC: 33.8 GM/DL — SIGNIFICANT CHANGE UP (ref 32–36)
MCV RBC AUTO: 89.7 FL — SIGNIFICANT CHANGE UP (ref 80–100)
MRSA PCR RESULT.: SIGNIFICANT CHANGE UP
NOROVIRUS GI+II RNA STL QL NAA+NON-PROBE: DETECTED
NRBC # BLD: 0 /100 WBCS — SIGNIFICANT CHANGE UP (ref 0–0)
OB PNL STL: NEGATIVE — SIGNIFICANT CHANGE UP
PHOSPHATE SERPL-MCNC: 2.1 MG/DL — LOW (ref 2.5–4.5)
PHOSPHATE SERPL-MCNC: 4.6 MG/DL — HIGH (ref 2.5–4.5)
PLATELET # BLD AUTO: 80 K/UL — LOW (ref 150–400)
POTASSIUM SERPL-MCNC: 4.1 MMOL/L — SIGNIFICANT CHANGE UP (ref 3.5–5.3)
POTASSIUM SERPL-MCNC: 5 MMOL/L — SIGNIFICANT CHANGE UP (ref 3.5–5.3)
POTASSIUM SERPL-SCNC: 4.1 MMOL/L — SIGNIFICANT CHANGE UP (ref 3.5–5.3)
POTASSIUM SERPL-SCNC: 5 MMOL/L — SIGNIFICANT CHANGE UP (ref 3.5–5.3)
PROT SERPL-MCNC: 5.3 G/DL — LOW (ref 6–8.3)
PTH-INTACT FLD-MCNC: 132 PG/ML — HIGH (ref 15–65)
RBC # BLD: 2.9 M/UL — LOW (ref 3.8–5.2)
RBC # FLD: 15.5 % — HIGH (ref 10.3–14.5)
S AUREUS DNA NOSE QL NAA+PROBE: DETECTED
SODIUM SERPL-SCNC: 136 MMOL/L — SIGNIFICANT CHANGE UP (ref 135–145)
SODIUM SERPL-SCNC: 138 MMOL/L — SIGNIFICANT CHANGE UP (ref 135–145)
VIT D25+D1,25 OH+D1,25 PNL SERPL-MCNC: 178 PG/ML — HIGH (ref 19.9–79.3)
WBC # BLD: 6.43 K/UL — SIGNIFICANT CHANGE UP (ref 3.8–10.5)
WBC # FLD AUTO: 6.43 K/UL — SIGNIFICANT CHANGE UP (ref 3.8–10.5)

## 2024-02-15 PROCEDURE — 99233 SBSQ HOSP IP/OBS HIGH 50: CPT

## 2024-02-15 PROCEDURE — 99232 SBSQ HOSP IP/OBS MODERATE 35: CPT | Mod: GC

## 2024-02-15 RX ORDER — CALCIUM GLUCONATE 100 MG/ML
2 VIAL (ML) INTRAVENOUS ONCE
Refills: 0 | Status: COMPLETED | OUTPATIENT
Start: 2024-02-15 | End: 2024-02-15

## 2024-02-15 RX ORDER — SODIUM,POTASSIUM PHOSPHATES 278-250MG
1 POWDER IN PACKET (EA) ORAL ONCE
Refills: 0 | Status: COMPLETED | OUTPATIENT
Start: 2024-02-15 | End: 2024-02-15

## 2024-02-15 RX ORDER — MAGNESIUM OXIDE 400 MG ORAL TABLET 241.3 MG
400 TABLET ORAL ONCE
Refills: 0 | Status: COMPLETED | OUTPATIENT
Start: 2024-02-15 | End: 2024-02-15

## 2024-02-15 RX ORDER — POTASSIUM PHOSPHATE, MONOBASIC POTASSIUM PHOSPHATE, DIBASIC 236; 224 MG/ML; MG/ML
15 INJECTION, SOLUTION INTRAVENOUS ONCE
Refills: 0 | Status: COMPLETED | OUTPATIENT
Start: 2024-02-15 | End: 2024-02-15

## 2024-02-15 RX ORDER — MAGNESIUM SULFATE 500 MG/ML
1 VIAL (ML) INJECTION ONCE
Refills: 0 | Status: COMPLETED | OUTPATIENT
Start: 2024-02-15 | End: 2024-02-15

## 2024-02-15 RX ADMIN — Medication 1 PACKET(S): at 07:40

## 2024-02-15 RX ADMIN — CARVEDILOL PHOSPHATE 6.25 MILLIGRAM(S): 80 CAPSULE, EXTENDED RELEASE ORAL at 05:34

## 2024-02-15 RX ADMIN — Medication 500 MILLIGRAM(S): at 11:13

## 2024-02-15 RX ADMIN — Medication 100 GRAM(S): at 01:08

## 2024-02-15 RX ADMIN — POTASSIUM PHOSPHATE, MONOBASIC POTASSIUM PHOSPHATE, DIBASIC 62.5 MILLIMOLE(S): 236; 224 INJECTION, SOLUTION INTRAVENOUS at 11:14

## 2024-02-15 RX ADMIN — Medication 1 TABLET(S): at 11:13

## 2024-02-15 RX ADMIN — Medication 100 MILLIGRAM(S): at 11:14

## 2024-02-15 RX ADMIN — MAGNESIUM OXIDE 400 MG ORAL TABLET 400 MILLIGRAM(S): 241.3 TABLET ORAL at 11:14

## 2024-02-15 RX ADMIN — Medication 200 GRAM(S): at 08:49

## 2024-02-15 RX ADMIN — CARVEDILOL PHOSPHATE 6.25 MILLIGRAM(S): 80 CAPSULE, EXTENDED RELEASE ORAL at 17:11

## 2024-02-15 RX ADMIN — MAGNESIUM OXIDE 400 MG ORAL TABLET 400 MILLIGRAM(S): 241.3 TABLET ORAL at 07:40

## 2024-02-15 RX ADMIN — CHLORHEXIDINE GLUCONATE 1 APPLICATION(S): 213 SOLUTION TOPICAL at 05:31

## 2024-02-15 RX ADMIN — Medication 100 GRAM(S): at 18:09

## 2024-02-15 RX ADMIN — MAGNESIUM OXIDE 400 MG ORAL TABLET 400 MILLIGRAM(S): 241.3 TABLET ORAL at 01:08

## 2024-02-15 NOTE — PROGRESS NOTE ADULT - SUBJECTIVE AND OBJECTIVE BOX
Patient is a 73y old  Female who presents with a chief complaint of generalized fatigue/weakness (15 Feb 2024 09:50)    Patient seen and examined at bedside, feeling well.    MEDICATIONS  (STANDING):  ascorbic acid 500 milliGRAM(s) Oral daily  carvedilol 6.25 milliGRAM(s) Oral every 12 hours  chlorhexidine 2% Cloths 1 Application(s) Topical <User Schedule>  magnesium oxide 400 milliGRAM(s) Oral daily  multivitamin 1 Tablet(s) Oral daily  sodium chloride 0.9% with potassium chloride 20 mEq/L 1000 milliLiter(s) (100 mL/Hr) IV Continuous <Continuous>  thiamine 100 milliGRAM(s) Oral daily    MEDICATIONS  (PRN):  acetaminophen     Tablet .. 650 milliGRAM(s) Oral every 6 hours PRN Temp greater or equal to 38C (100.4F), Mild Pain (1 - 3)  aluminum hydroxide/magnesium hydroxide/simethicone Suspension 30 milliLiter(s) Oral every 4 hours PRN Dyspepsia  melatonin 3 milliGRAM(s) Oral at bedtime PRN Insomnia  ondansetron Injectable 4 milliGRAM(s) IV Push every 8 hours PRN Nausea and/or Vomiting    Vital Signs Last 24 Hrs  T(C): 36.3 (15 Feb 2024 11:34), Max: 36.9 (14 Feb 2024 20:36)  T(F): 97.4 (15 Feb 2024 11:34), Max: 98.5 (14 Feb 2024 20:36)  HR: 89 (15 Feb 2024 11:34) (84 - 99)  BP: 122/81 (15 Feb 2024 11:34) (115/81 - 138/88)  BP(mean): --  RR: 18 (15 Feb 2024 11:34) (18 - 18)  SpO2: 97% (15 Feb 2024 11:34) (95% - 97%)    Parameters below as of 15 Feb 2024 11:34  Patient On (Oxygen Delivery Method): room air    PE  NAD  Awake, alert  Anicteric, MMM  No c/c/e  No rash grossly  FROM                        8.8    6.43  )-----------( 80       ( 15 Feb 2024 06:14 )             26.0       02-15    138  |  101  |  13  ----------------------------<  96  4.1   |  27  |  0.41<L>    Ca    6.1<LL>      15 Feb 2024 06:15  Phos  2.1     02-15  Mg     1.5     02-15    TPro  5.3<L>  /  Alb  2.5<L>  /  TBili  0.5  /  DBili  x   /  AST  32  /  ALT  79<H>  /  AlkPhos  96  02-15

## 2024-02-15 NOTE — PROGRESS NOTE ADULT - ASSESSMENT
74yo f w pmh htn, breast ca, p/w generalized fatigue/weakness (x1 week in duration, a/w decreased po intake, n+v+d) leading to syncopal episode    Breast ca  --under the care of Easton Eduardo of [facility]  --newly diagnosed, right sided cT2N0 (Triple negative, w/ robust tumor infiltrating lymphocytes)  --currently on neoadjuvant treatment w/  (Cyclophosphamide, Doxorubicin, Pembrolizumab +Onpro) Last given on 02/02/24  --no systemic treatment while inpatient and/or rehab  --ongoing care after discharge    Pancytopenia  --2/2 malignancy and treatment  --transfuse for Hgb <8, platelets <10    syncopal episode  --2/2 decreased PO intake, N/V/D, and anemia  --CT head: No evidence of acute transcortical infarct, acute intracranial hemorrhage or mass effect.  --CT cervical spine: No acute fracture or traumatic subluxation.  --on tele    Electrolyte depletions   --replete as needed, management per nephrology  --Given low vit D-25 with elevated PTH, suspect secondary hyperparathyroidism   --2/2 N/V/D    Poor PO intake  --Follow up nutrition recommendations    will follow,    Edward Juan PA-C  Hematology/Oncology  [facility]  [phone] (office)

## 2024-02-15 NOTE — PROGRESS NOTE ADULT - SUBJECTIVE AND OBJECTIVE BOX
Patient is a 73y old  Female who presents with a chief complaint of generalized fatigue/weakness (15 Feb 2024 14:54)        SUBJECTIVE / OVERNIGHT EVENTS: patient feels better and eating better but had some diarrhea again today.       MEDICATIONS  (STANDING):  ascorbic acid 500 milliGRAM(s) Oral daily  carvedilol 6.25 milliGRAM(s) Oral every 12 hours  chlorhexidine 2% Cloths 1 Application(s) Topical <User Schedule>  magnesium oxide 400 milliGRAM(s) Oral daily  magnesium sulfate  IVPB 1 Gram(s) IV Intermittent once  multivitamin 1 Tablet(s) Oral daily  sodium chloride 0.9% with potassium chloride 20 mEq/L 1000 milliLiter(s) (100 mL/Hr) IV Continuous <Continuous>  thiamine 100 milliGRAM(s) Oral daily    MEDICATIONS  (PRN):  acetaminophen     Tablet .. 650 milliGRAM(s) Oral every 6 hours PRN Temp greater or equal to 38C (100.4F), Mild Pain (1 - 3)  aluminum hydroxide/magnesium hydroxide/simethicone Suspension 30 milliLiter(s) Oral every 4 hours PRN Dyspepsia  melatonin 3 milliGRAM(s) Oral at bedtime PRN Insomnia  ondansetron Injectable 4 milliGRAM(s) IV Push every 8 hours PRN Nausea and/or Vomiting      Vital Signs Last 24 Hrs  T(C): 36.3 (15 Feb 2024 11:34), Max: 36.9 (14 Feb 2024 20:36)  T(F): 97.4 (15 Feb 2024 11:34), Max: 98.5 (14 Feb 2024 20:36)  HR: 96 (15 Feb 2024 16:35) (84 - 97)  BP: 118/78 (15 Feb 2024 16:35) (115/81 - 135/94)  BP(mean): --  RR: 18 (15 Feb 2024 11:34) (18 - 18)  SpO2: 97% (15 Feb 2024 11:34) (95% - 97%)    Parameters below as of 15 Feb 2024 11:34  Patient On (Oxygen Delivery Method): room air      CAPILLARY BLOOD GLUCOSE        I&O's Summary    14 Feb 2024 07:01  -  15 Feb 2024 07:00  --------------------------------------------------------  IN: 950 mL / OUT: 700 mL / NET: 250 mL          PHYSICAL EXAM:   GENERAL: NAD, thin  HEAD:  Atraumatic, Normocephalic  EYES:   conjunctiva and sclera clear  NECK: Supple,    CHEST/LUNG: Clear to auscultation bilaterally; No wheeze  HEART: S1S2 normal. Regular rate and rhythm; No murmurs, rubs, or gallops  ABDOMEN: Soft, Nontender, Nondistended; Bowel sounds present  EXTREMITIES:   No clubbing, cyanosis, or edema  PSYCH/Neuro: AAOx3. Non-focal.   SKIN: No rashes or lesions      LABS:                        8.8    6.43  )-----------( 80       ( 15 Feb 2024 06:14 )             26.0     02-15    136  |  101  |  16  ----------------------------<  106<H>  5.0   |  26  |  0.47<L>    Ca    6.9<L>      15 Feb 2024 15:55  Phos  4.6     02-15  Mg     1.2     02-15    TPro  5.3<L>  /  Alb  2.5<L>  /  TBili  0.5  /  DBili  x   /  AST  32  /  ALT  79<H>  /  AlkPhos  96  02-15          Urinalysis Basic - ( 15 Feb 2024 15:55 )    Color: x / Appearance: x / SG: x / pH: x  Gluc: 106 mg/dL / Ketone: x  / Bili: x / Urobili: x   Blood: x / Protein: x / Nitrite: x   Leuk Esterase: x / RBC: x / WBC x   Sq Epi: x / Non Sq Epi: x / Bacteria: x          RADIOLOGY & ADDITIONAL TESTS:    Imaging Personally Reviewed:  Consultant(s) Notes Reviewed:  renal and heme  Care Discussed with Consultants/Other Providers:

## 2024-02-15 NOTE — PROVIDER CONTACT NOTE (CRITICAL VALUE NOTIFICATION) - BACKGROUND
Lip quivering.   Health Maintenance Due   Topic Date Due   • COVID-19 Vaccine (1) Never done   • Shingles Vaccine (1 of 2) Never done   • Medicare Wellness Visit  09/14/2021   • Depression Screening  09/14/2021       Patient is due for topics as listed above but is not proceeding with Immunization(s) Shingles at this time. Education provided for Immunization(s) ShinglesPatient has given consent to record this visit for documentation in their clinical record.    Patient is going to be receiving covid injection today           
Patient admitted with syncope
73 Y F p/w generalized fatigue, weakness, decreased po intake leading to fall. Dx syncope, elevated trops, TATY, hypok, hypomg, hypophos. PMHx breast cancer, HTN, electrolyte depletion.

## 2024-02-15 NOTE — PROGRESS NOTE ADULT - PROBLEM SELECTOR PLAN 1
trauma work up with no acute significant findings  neuroimaging with no acute significant findings  ekg with no new st seg - t wave changes suggestive of acute ischemia - shows sinus tach  trop 77->67, suspect demand and/or decreased clearance  -TTE reassuring, EF 54% and no wall motion abnormalities.   follow up tsh, folate, b12, rpr; orthostatic vitals, tte; ua +/- uc, rvp + cxr; ruq us + hepatitis panel neg  frequent neurochecks  monitor on telemetry  maintain fall + frac, delirium, seizure, aspiration precautions; keep head end of bed elevated  nutrition consult appreciated.   Consider dronabinol for poor po intake.   pt/ot eval + sw/cm consult for disposition

## 2024-02-15 NOTE — PROVIDER CONTACT NOTE (CRITICAL VALUE NOTIFICATION) - ACTION/TREATMENT ORDERED:
Provider made aware. Will endorse to next shit. Monitoring remains ongoing.
NOEL Ahuja notified. Calcium supplemented. Follow up BMP ordered for 16:00.

## 2024-02-15 NOTE — PROGRESS NOTE ADULT - PROBLEM SELECTOR PLAN 1
Currently with hypomag, hypoK, hypophosphatemia as well as hypocalcemia. This is a patient on active chemotherapy starting in Oct 2023 with her last chemotherapy session on 2/2/2024. Her initial presentation was for syncope with initial labs noted with what appears to be prerenal azotemia (responded to fluids as well as holding her home hctz. She said prior to her presentation, she was experiencing dizziness with poor PO intake due to lack of appetite. She now says her dizziness has resolved after fluid resuscitation and her appetite has improved during hospital admission. Overall, this appears to be consistent with poor PO intake/refeeding syndrome as well as diuretic use.  - Overall, electrolytes improved today compared to yesterday  - Continue to monitor bmp, mag, phos. Replenish as appropriate preferably orally for potassium specifically given she is able to now tolerate PO intake.  - In regards to her hypocalcemia, can eval with PTH, Vitamin D-25, Vitamin D-1,25 (pending in lab). Suspect her hypocalcemia may be secondary to vitamin D deficiency given her clinical picture as well as her hypomagnesemia. Currently with hypomag, hypoK, hypophosphatemia as well as hypocalcemia. This is a patient on active chemotherapy starting in Oct 2023 with her last chemotherapy session on 2/2/2024. Her initial presentation was for syncope with initial labs noted with what appears to be prerenal azotemia (responded to fluids as well as holding her home hctz. She said prior to her presentation, she was experiencing dizziness with poor PO intake due to lack of appetite. She now says her dizziness has resolved after fluid resuscitation and her appetite has improved during hospital admission. Overall, this appears to be consistent with poor PO intake/refeeding syndrome as well as diuretic use.  - Overall, electrolytes improved today compared to yesterday  - Continue to monitor bmp, mag, phos. Replenish as appropriate preferably orally for potassium specifically given she is able to now tolerate PO intake.  - In regards to her hypocalcemia, Vitamin D-25 appears to be on the low end of normal with elevated PTH. Patient may still have relative Vitamin D-25 deficiency Currently with hypomag, hypoK, hypophosphatemia as well as hypocalcemia. This is a patient on active chemotherapy starting in Oct 2023 with her last chemotherapy session on 2/2/2024. Her initial presentation was for syncope with initial labs noted with what appears to be prerenal azotemia (responded to fluids as well as holding her home hctz. She said prior to her presentation, she was experiencing dizziness with poor PO intake due to lack of appetite. She now says her dizziness has resolved after fluid resuscitation and her appetite has improved during hospital admission. Overall, this appears to be consistent with poor PO intake/refeeding syndrome as well as diuretic use.  - Overall, electrolytes improved today compared to yesterday  - Continue to monitor bmp, mag, phos. Replenish as appropriate preferably orally for potassium specifically given she is able to now tolerate PO intake.  - In regards to her hypocalcemia, Vitamin D-25 appears to be on the low end of normal with elevated PTH. Suspect she has secondary hyperparathyroidism due to her hypocalcemia but her hypocalcemia is caused by PTH resistance from hypomagnesemia. Continue to replenish mag. Please obtain ionized calcium

## 2024-02-15 NOTE — PROGRESS NOTE ADULT - SUBJECTIVE AND OBJECTIVE BOX
[facility] DIVISION OF KIDNEY DISEASES AND HYPERTENSION   FOLLOW UP NOTE    --------------------------------------------------------------------------------    SUBJECTIVE / ROS / INTERVAL EVENTS:  - Patient seen and examined at bedside  - Patient continues to report improvement in appetite. Feels well. No other symptoms or concerns        PAST HISTORY  --------------------------------------------------------------------------------  No significant changes to PMH, PSH, FHx, SHx, unless otherwise noted    ALLERGIES & MEDICATIONS  --------------------------------------------------------------------------------  Allergies    No Known Allergies    Intolerances      Standing Inpatient Medications  ascorbic acid 500 milliGRAM(s) Oral daily  carvedilol 6.25 milliGRAM(s) Oral every 12 hours  chlorhexidine 2% Cloths 1 Application(s) Topical <User Schedule>  magnesium oxide 400 milliGRAM(s) Oral daily  multivitamin 1 Tablet(s) Oral daily  potassium phosphate IVPB 15 milliMole(s) IV Intermittent once  sodium chloride 0.9% with potassium chloride 20 mEq/L 1000 milliLiter(s) IV Continuous <Continuous>  thiamine 100 milliGRAM(s) Oral daily    PRN Inpatient Medications  acetaminophen     Tablet .. 650 milliGRAM(s) Oral every 6 hours PRN  aluminum hydroxide/magnesium hydroxide/simethicone Suspension 30 milliLiter(s) Oral every 4 hours PRN  melatonin 3 milliGRAM(s) Oral at bedtime PRN  ondansetron Injectable 4 milliGRAM(s) IV Push every 8 hours PRN      VITALS  --------------------------------------------------------------------------------  T(C): 36.6 (02-15-24 @ 08:19), Max: 36.9 (02-14-24 @ 20:36)  HR: 84 (02-15-24 @ 08:19) (84 - 99)  BP: 115/81 (02-15-24 @ 08:19) (115/81 - 138/88)  RR: 18 (02-15-24 @ 08:19) (18 - 18)  SpO2: 95% (02-15-24 @ 08:19) (95% - 96%)  Wt(kg): --      02-14-24 @ 07:01  -  02-15-24 @ 07:00  --------------------------------------------------------  IN: 950 mL / OUT: 700 mL / NET: 250 mL      PHYSICAL EXAM:  General: no acute distress, cachetic  Neuro: no focal deficits  HEENT: NC/AT, anicteric, no JVD  Pulmonary: lungs CTA B/L  Cardiovascular/Chest: +S1S2, RRR  GI/Abdomen: soft, NT/ND, +bowel sounds        Transplant site: N/A  Extremities: No edema  : martinez  Skin: Warm and dry  Vascular access:    LABS/STUDIES  --------------------------------------------------------------------------------              8.8    6.43  >-----------<  80       [02-15-24 @ 06:14]              26.0     138  |  101  |  13  ----------------------------<  96      [02-15-24 @ 06:15]  4.1   |  27  |  0.41        Ca     6.1     [02-15-24 @ 06:15]      Mg     1.5     [02-15-24 @ 06:15]      Phos  2.1     [02-15-24 @ 06:15]    TPro  5.3  /  Alb  2.5  /  TBili  0.5  /  DBili  x   /  AST  32  /  ALT  79  /  AlkPhos  96  [02-15-24 @ 06:15]          Creatinine Trend:  SCr 0.41 [02-15 @ 06:15]  SCr 0.44 [02-14 @ 22:48]  SCr 0.47 [02-14 @ 06:42]  SCr 0.76 [02-13 @ 06:35]  SCr 1.11 [02-12 @ 18:04]    Urinalysis - [02-15-24 @ 06:15]      Color  / Appearance  / SG  / pH       Gluc 96 / Ketone   / Bili  / Urobili        Blood  / Protein  / Leuk Est  / Nitrite       RBC  / WBC  / Hyaline  / Gran  / Sq Epi  / Non Sq Epi  / Bacteria     Urine Creatinine 54      [02-13-24 @ 21:25]  Urine Protein 58      [02-13-24 @ 21:25]  Urine Sodium 91      [02-13-24 @ 21:25]  Urine Urea Nitrogen 696      [02-13-24 @ 21:25]  Urine Potassium 35      [02-13-24 @ 21:25]  Urine Osmolality 552      [02-13-24 @ 21:25]    HBsAb Nonreact      [02-14-24 @ 06:42]  HBsAg Nonreact      [02-13-24 @ 06:35]  HBcAb Nonreact      [02-14-24 @ 06:42]  HCV 0.04, Nonreact      [02-14-24 @ 06:42]      Tacrolimus  Cyclosporine  Sirolimus  Mycophenolate  BK PCR  CMV PCR  Parvo PCR  EBV PCR

## 2024-02-15 NOTE — PROGRESS NOTE ADULT - ASSESSMENT
72yo f w pmh htn, breast ca, p/w generalized fatigue/weakness leading to fall, unclear etiology; in er, found to have multiple electrolyte abnormalities in the setting of poor PO intake now with improved appetite/PO intake. Nephro consulted for electrolyte abnormalities

## 2024-02-15 NOTE — PROVIDER CONTACT NOTE (CRITICAL VALUE NOTIFICATION) - ASSESSMENT
Patient remains aox4;vss. Patient denies any heart palpitations, chest pain, sob.
Pt A&Ox4, able to make needs known. Pt asymptomatic. VSS. No s/s of bleeding. Pt denies cp, denies SOB, denies pain.

## 2024-02-16 LAB
ANION GAP SERPL CALC-SCNC: 11 MMOL/L — SIGNIFICANT CHANGE UP (ref 5–17)
BUN SERPL-MCNC: 12 MG/DL — SIGNIFICANT CHANGE UP (ref 7–23)
CALCIUM SERPL-MCNC: 6.9 MG/DL — LOW (ref 8.4–10.5)
CHLORIDE SERPL-SCNC: 98 MMOL/L — SIGNIFICANT CHANGE UP (ref 96–108)
CO2 SERPL-SCNC: 25 MMOL/L — SIGNIFICANT CHANGE UP (ref 22–31)
CREAT SERPL-MCNC: 0.41 MG/DL — LOW (ref 0.5–1.3)
CULTURE RESULTS: SIGNIFICANT CHANGE UP
EGFR: 103 ML/MIN/1.73M2 — SIGNIFICANT CHANGE UP
GLUCOSE SERPL-MCNC: 88 MG/DL — SIGNIFICANT CHANGE UP (ref 70–99)
HCT VFR BLD CALC: 27.6 % — LOW (ref 34.5–45)
HGB BLD-MCNC: 9.1 G/DL — LOW (ref 11.5–15.5)
MAGNESIUM SERPL-MCNC: 1.2 MG/DL — LOW (ref 1.6–2.6)
MCHC RBC-ENTMCNC: 30.2 PG — SIGNIFICANT CHANGE UP (ref 27–34)
MCHC RBC-ENTMCNC: 33 GM/DL — SIGNIFICANT CHANGE UP (ref 32–36)
MCV RBC AUTO: 91.7 FL — SIGNIFICANT CHANGE UP (ref 80–100)
NRBC # BLD: 0 /100 WBCS — SIGNIFICANT CHANGE UP (ref 0–0)
PHOSPHATE SERPL-MCNC: 2.8 MG/DL — SIGNIFICANT CHANGE UP (ref 2.5–4.5)
PLATELET # BLD AUTO: 119 K/UL — LOW (ref 150–400)
POTASSIUM SERPL-MCNC: 4.1 MMOL/L — SIGNIFICANT CHANGE UP (ref 3.5–5.3)
POTASSIUM SERPL-SCNC: 4.1 MMOL/L — SIGNIFICANT CHANGE UP (ref 3.5–5.3)
RBC # BLD: 3.01 M/UL — LOW (ref 3.8–5.2)
RBC # FLD: 15.3 % — HIGH (ref 10.3–14.5)
SODIUM SERPL-SCNC: 134 MMOL/L — LOW (ref 135–145)
SPECIMEN SOURCE: SIGNIFICANT CHANGE UP
WBC # BLD: 7.59 K/UL — SIGNIFICANT CHANGE UP (ref 3.8–10.5)
WBC # FLD AUTO: 7.59 K/UL — SIGNIFICANT CHANGE UP (ref 3.8–10.5)

## 2024-02-16 PROCEDURE — 99232 SBSQ HOSP IP/OBS MODERATE 35: CPT | Mod: GC

## 2024-02-16 PROCEDURE — 99233 SBSQ HOSP IP/OBS HIGH 50: CPT

## 2024-02-16 RX ORDER — MAGNESIUM SULFATE 500 MG/ML
2 VIAL (ML) INJECTION ONCE
Refills: 0 | Status: COMPLETED | OUTPATIENT
Start: 2024-02-16 | End: 2024-02-16

## 2024-02-16 RX ORDER — MAGNESIUM OXIDE 400 MG ORAL TABLET 241.3 MG
400 TABLET ORAL
Refills: 0 | Status: DISCONTINUED | OUTPATIENT
Start: 2024-02-16 | End: 2024-02-20

## 2024-02-16 RX ORDER — CALCIUM GLUCONATE 100 MG/ML
2 VIAL (ML) INTRAVENOUS ONCE
Refills: 0 | Status: COMPLETED | OUTPATIENT
Start: 2024-02-16 | End: 2024-02-16

## 2024-02-16 RX ORDER — LISINOPRIL 2.5 MG/1
10 TABLET ORAL DAILY
Refills: 0 | Status: DISCONTINUED | OUTPATIENT
Start: 2024-02-16 | End: 2024-02-20

## 2024-02-16 RX ADMIN — CHLORHEXIDINE GLUCONATE 1 APPLICATION(S): 213 SOLUTION TOPICAL at 06:32

## 2024-02-16 RX ADMIN — Medication 25 GRAM(S): at 11:29

## 2024-02-16 RX ADMIN — Medication 200 GRAM(S): at 11:30

## 2024-02-16 RX ADMIN — Medication 500 MILLIGRAM(S): at 11:29

## 2024-02-16 RX ADMIN — Medication 100 MILLIGRAM(S): at 11:29

## 2024-02-16 RX ADMIN — MAGNESIUM OXIDE 400 MG ORAL TABLET 400 MILLIGRAM(S): 241.3 TABLET ORAL at 11:29

## 2024-02-16 RX ADMIN — CARVEDILOL PHOSPHATE 6.25 MILLIGRAM(S): 80 CAPSULE, EXTENDED RELEASE ORAL at 17:35

## 2024-02-16 RX ADMIN — CARVEDILOL PHOSPHATE 6.25 MILLIGRAM(S): 80 CAPSULE, EXTENDED RELEASE ORAL at 06:32

## 2024-02-16 RX ADMIN — Medication 1 TABLET(S): at 11:29

## 2024-02-16 RX ADMIN — MAGNESIUM OXIDE 400 MG ORAL TABLET 400 MILLIGRAM(S): 241.3 TABLET ORAL at 17:36

## 2024-02-16 NOTE — PROVIDER CONTACT NOTE (OTHER) - ASSESSMENT
Patient asymptomatic.  Critical Lab:  Potassium 2.8  Calcium 6.4
patient resting in bed during time of event asymptomatic and back to baseline of NSR on tele.
AOx4, /85 HR89  denies CP, headache, dizziness or palpitation
patient resting in bed VSS, asymptomatic at this time

## 2024-02-16 NOTE — PROVIDER CONTACT NOTE (OTHER) - REASON
Critical
PAT 5.2 sec HR up to 151, PAT 8.8 sec HR up to 132
pt had 4 beats WCT HR up to 183
suspected DTI to sacrum

## 2024-02-16 NOTE — PROVIDER CONTACT NOTE (OTHER) - ACTION/TREATMENT ORDERED:
Electrolyte imbalance medical management. Purposeful rounding in progress.
care ongoing, morning labs to be drawn and sent, safety and comfort measures maintained
ordered Ca and Mg IV supplement  monitor for symptoms
wound care and dietician orders to be placed care ongoing safety and comfort measures maintained

## 2024-02-16 NOTE — PROGRESS NOTE ADULT - PROBLEM SELECTOR PLAN 1
Currently with hypomag, hypoK, hypophosphatemia as well as hypocalcemia. This is a patient on active chemotherapy starting in Oct 2023 with her last chemotherapy session on 2/2/2024. Her initial presentation was for syncope with initial labs noted with what appears to be prerenal azotemia (responded to fluids as well as holding her home hctz. She said prior to her presentation, she was experiencing dizziness with poor PO intake due to lack of appetite. She now says her dizziness has resolved after fluid resuscitation and her appetite has improved during hospital admission. Overall, this appears to be consistent with poor PO intake/refeeding syndrome as well as diuretic use.  - Overall, electrolytes improved today compared to yesterday but still with hypomag and hypocalcemia.  - Continue to monitor bmp, mag, phos. Replenish as appropriate preferably orally for potassium specifically given she is able to now tolerate PO intake.  - In regards to her hypocalcemia, Vitamin D-25 appears to be on the low end of normal with elevated PTH. Suspect she has secondary hyperparathyroidism due to her hypocalcemia but her hypocalcemia is caused by PTH resistance from hypomagnesemia. Her Vitamin D-1,25 elevation may be secondary to her secondary hyperparathyroidism. Continue to replenish mag. Please obtain ionized calcium. Currently with hypomag, hypoK, hypophosphatemia as well as hypocalcemia. This is a patient on active chemotherapy starting in Oct 2023 with her last chemotherapy session on 2/2/2024. Her initial presentation was for syncope with initial labs noted with what appears to be prerenal azotemia (responded to fluids as well as holding her home hctz. She said prior to her presentation, she was experiencing dizziness with poor PO intake due to lack of appetite. She now says her dizziness has resolved after fluid resuscitation and her appetite has improved during hospital admission. Overall, this appears to be consistent with poor PO intake/refeeding syndrome as well as diuretic use.  - Overall, electrolytes improved today compared to yesterday but still with hypomag and hypocalcemia. Given persistent hypomag, please obtain urine electrolytes (creatinine and magnesium) if able.  - Continue to monitor bmp, mag, phos. Replenish as appropriate preferably orally for potassium specifically given she is able to now tolerate PO intake.  - In regards to her hypocalcemia, Vitamin D-25 appears to be on the low end of normal with elevated PTH. Suspect she has secondary hyperparathyroidism due to her hypocalcemia but her hypocalcemia is caused by PTH resistance from hypomagnesemia. Her Vitamin D-1,25 elevation may be secondary to her secondary hyperparathyroidism. Continue to replenish mag. Please obtain ionized calcium.

## 2024-02-16 NOTE — PROGRESS NOTE ADULT - SUBJECTIVE AND OBJECTIVE BOX
[facility] DIVISION OF KIDNEY DISEASES AND HYPERTENSION   FOLLOW UP NOTE    --------------------------------------------------------------------------------    SUBJECTIVE / ROS / INTERVAL EVENTS:  - Patient seen and examined at bedside  -         PAST HISTORY  --------------------------------------------------------------------------------  No significant changes to PMH, PSH, FHx, SHx, unless otherwise noted    ALLERGIES & MEDICATIONS  --------------------------------------------------------------------------------  Allergies    No Known Allergies    Intolerances      Standing Inpatient Medications  ascorbic acid 500 milliGRAM(s) Oral daily  calcium gluconate IVPB 2 Gram(s) IV Intermittent once  carvedilol 6.25 milliGRAM(s) Oral every 12 hours  chlorhexidine 2% Cloths 1 Application(s) Topical <User Schedule>  magnesium oxide 400 milliGRAM(s) Oral daily  magnesium sulfate  IVPB 2 Gram(s) IV Intermittent once  multivitamin 1 Tablet(s) Oral daily  sodium chloride 0.9% with potassium chloride 20 mEq/L 1000 milliLiter(s) IV Continuous <Continuous>  thiamine 100 milliGRAM(s) Oral daily    PRN Inpatient Medications  acetaminophen     Tablet .. 650 milliGRAM(s) Oral every 6 hours PRN  aluminum hydroxide/magnesium hydroxide/simethicone Suspension 30 milliLiter(s) Oral every 4 hours PRN  melatonin 3 milliGRAM(s) Oral at bedtime PRN  ondansetron Injectable 4 milliGRAM(s) IV Push every 8 hours PRN      VITALS  --------------------------------------------------------------------------------  T(C): 36.7 (02-16-24 @ 04:25), Max: 37.1 (02-15-24 @ 20:30)  HR: 89 (02-16-24 @ 08:43) (67 - 96)  BP: 133/85 (02-16-24 @ 08:43) (110/74 - 140/94)  RR: 18 (02-16-24 @ 04:25) (18 - 18)  SpO2: 96% (02-16-24 @ 04:25) (96% - 98%)  Wt(kg): --      02-15-24 @ 07:01  -  02-16-24 @ 07:00  --------------------------------------------------------  IN: 150 mL / OUT: 200 mL / NET: -50 mL      PHYSICAL EXAM:  General: no acute distress  Neuro: no focal deficits  HEENT: NC/AT, anicteric, no JVD  Pulmonary: lungs CTA B/L  Cardiovascular/Chest: +S1S2, RRR  GI/Abdomen: soft, NT/ND, +bowel sounds        Transplant site: N/A  Extremities: No edema  : martinez  Skin: Warm and dry  Vascular access:    LABS/STUDIES  --------------------------------------------------------------------------------              9.1    7.59  >-----------<  119      [02-16-24 @ 07:22]              27.6     134  |  98  |  12  ----------------------------<  88      [02-16-24 @ 07:25]  4.1   |  25  |  0.41        Ca     6.9     [02-16-24 @ 07:25]      Mg     1.2     [02-16-24 @ 07:25]      Phos  2.8     [02-16-24 @ 07:25]    TPro  5.3  /  Alb  2.5  /  TBili  0.5  /  DBili  x   /  AST  32  /  ALT  79  /  AlkPhos  96  [02-15-24 @ 06:15]          Creatinine Trend:  SCr 0.41 [02-16 @ 07:25]  SCr 0.47 [02-15 @ 15:55]  SCr 0.41 [02-15 @ 06:15]  SCr 0.44 [02-14 @ 22:48]  SCr 0.47 [02-14 @ 06:42]    Urinalysis - [02-16-24 @ 07:25]      Color  / Appearance  / SG  / pH       Gluc 88 / Ketone   / Bili  / Urobili        Blood  / Protein  / Leuk Est  / Nitrite       RBC  / WBC  / Hyaline  / Gran  / Sq Epi  / Non Sq Epi  / Bacteria     Urine Creatinine 54      [02-13-24 @ 21:25]  Urine Protein 58      [02-13-24 @ 21:25]  Urine Sodium 91      [02-13-24 @ 21:25]  Urine Urea Nitrogen 696      [02-13-24 @ 21:25]  Urine Potassium 35      [02-13-24 @ 21:25]  Urine Osmolality 552      [02-13-24 @ 21:25]    HBsAb Nonreact      [02-14-24 @ 06:42]  HBsAg Nonreact      [02-13-24 @ 06:35]  HBcAb Nonreact      [02-14-24 @ 06:42]  HCV 0.04, Nonreact      [02-14-24 @ 06:42]      Tacrolimus  Cyclosporine  Sirolimus  Mycophenolate  BK PCR  CMV PCR  Parvo PCR  EBV PCR [facility] DIVISION OF KIDNEY DISEASES AND HYPERTENSION   FOLLOW UP NOTE    --------------------------------------------------------------------------------    SUBJECTIVE / ROS / INTERVAL EVENTS:  - Patient seen and examined at bedside  - Patient overall feels well. Improved appetite. Denies any BM last 24 hrs.        PAST HISTORY  --------------------------------------------------------------------------------  No significant changes to PMH, PSH, FHx, SHx, unless otherwise noted    ALLERGIES & MEDICATIONS  --------------------------------------------------------------------------------  Allergies    No Known Allergies    Intolerances      Standing Inpatient Medications  ascorbic acid 500 milliGRAM(s) Oral daily  calcium gluconate IVPB 2 Gram(s) IV Intermittent once  carvedilol 6.25 milliGRAM(s) Oral every 12 hours  chlorhexidine 2% Cloths 1 Application(s) Topical <User Schedule>  magnesium oxide 400 milliGRAM(s) Oral daily  magnesium sulfate  IVPB 2 Gram(s) IV Intermittent once  multivitamin 1 Tablet(s) Oral daily  sodium chloride 0.9% with potassium chloride 20 mEq/L 1000 milliLiter(s) IV Continuous <Continuous>  thiamine 100 milliGRAM(s) Oral daily    PRN Inpatient Medications  acetaminophen     Tablet .. 650 milliGRAM(s) Oral every 6 hours PRN  aluminum hydroxide/magnesium hydroxide/simethicone Suspension 30 milliLiter(s) Oral every 4 hours PRN  melatonin 3 milliGRAM(s) Oral at bedtime PRN  ondansetron Injectable 4 milliGRAM(s) IV Push every 8 hours PRN      VITALS  --------------------------------------------------------------------------------  T(C): 36.7 (02-16-24 @ 04:25), Max: 37.1 (02-15-24 @ 20:30)  HR: 89 (02-16-24 @ 08:43) (67 - 96)  BP: 133/85 (02-16-24 @ 08:43) (110/74 - 140/94)  RR: 18 (02-16-24 @ 04:25) (18 - 18)  SpO2: 96% (02-16-24 @ 04:25) (96% - 98%)  Wt(kg): --      02-15-24 @ 07:01  -  02-16-24 @ 07:00  --------------------------------------------------------  IN: 150 mL / OUT: 200 mL / NET: -50 mL      PHYSICAL EXAM:  General: no acute distress, cachetic  Neuro: no focal deficits  HEENT: NC/AT, anicteric, no JVD  Pulmonary: lungs CTA B/L  Cardiovascular/Chest: +S1S2, RRR  GI/Abdomen: soft, NT/ND, +bowel sounds        Transplant site: N/A  Extremities: No edema  : martinez  Skin: Warm and dry  Vascular access:    LABS/STUDIES  --------------------------------------------------------------------------------              9.1    7.59  >-----------<  119      [02-16-24 @ 07:22]              27.6     134  |  98  |  12  ----------------------------<  88      [02-16-24 @ 07:25]  4.1   |  25  |  0.41        Ca     6.9     [02-16-24 @ 07:25]      Mg     1.2     [02-16-24 @ 07:25]      Phos  2.8     [02-16-24 @ 07:25]    TPro  5.3  /  Alb  2.5  /  TBili  0.5  /  DBili  x   /  AST  32  /  ALT  79  /  AlkPhos  96  [02-15-24 @ 06:15]          Creatinine Trend:  SCr 0.41 [02-16 @ 07:25]  SCr 0.47 [02-15 @ 15:55]  SCr 0.41 [02-15 @ 06:15]  SCr 0.44 [02-14 @ 22:48]  SCr 0.47 [02-14 @ 06:42]    Urinalysis - [02-16-24 @ 07:25]      Color  / Appearance  / SG  / pH       Gluc 88 / Ketone   / Bili  / Urobili        Blood  / Protein  / Leuk Est  / Nitrite       RBC  / WBC  / Hyaline  / Gran  / Sq Epi  / Non Sq Epi  / Bacteria     Urine Creatinine 54      [02-13-24 @ 21:25]  Urine Protein 58      [02-13-24 @ 21:25]  Urine Sodium 91      [02-13-24 @ 21:25]  Urine Urea Nitrogen 696      [02-13-24 @ 21:25]  Urine Potassium 35      [02-13-24 @ 21:25]  Urine Osmolality 552      [02-13-24 @ 21:25]    HBsAb Nonreact      [02-14-24 @ 06:42]  HBsAg Nonreact      [02-13-24 @ 06:35]  HBcAb Nonreact      [02-14-24 @ 06:42]  HCV 0.04, Nonreact      [02-14-24 @ 06:42]      Tacrolimus  Cyclosporine  Sirolimus  Mycophenolate  BK PCR  CMV PCR  Parvo PCR  EBV PCR

## 2024-02-16 NOTE — PROGRESS NOTE ADULT - SUBJECTIVE AND OBJECTIVE BOX
Patient is a 73y old  Female who presents with a chief complaint of generalized fatigue/weakness (16 Feb 2024 12:52)        SUBJECTIVE / OVERNIGHT EVENTS: still had some diarrhea in the morning. otherwise feels a little better.       MEDICATIONS  (STANDING):  ascorbic acid 500 milliGRAM(s) Oral daily  carvedilol 6.25 milliGRAM(s) Oral every 12 hours  chlorhexidine 2% Cloths 1 Application(s) Topical <User Schedule>  magnesium oxide 400 milliGRAM(s) Oral three times a day with meals  multivitamin 1 Tablet(s) Oral daily  sodium chloride 0.9% with potassium chloride 20 mEq/L 1000 milliLiter(s) (100 mL/Hr) IV Continuous <Continuous>  thiamine 100 milliGRAM(s) Oral daily    MEDICATIONS  (PRN):  acetaminophen     Tablet .. 650 milliGRAM(s) Oral every 6 hours PRN Temp greater or equal to 38C (100.4F), Mild Pain (1 - 3)  aluminum hydroxide/magnesium hydroxide/simethicone Suspension 30 milliLiter(s) Oral every 4 hours PRN Dyspepsia  melatonin 3 milliGRAM(s) Oral at bedtime PRN Insomnia  ondansetron Injectable 4 milliGRAM(s) IV Push every 8 hours PRN Nausea and/or Vomiting      Vital Signs Last 24 Hrs  T(C): 36.8 (16 Feb 2024 17:34), Max: 37.1 (15 Feb 2024 20:30)  T(F): 98.2 (16 Feb 2024 17:34), Max: 98.7 (15 Feb 2024 20:30)  HR: 95 (16 Feb 2024 17:34) (67 - 95)  BP: 131/94 (16 Feb 2024 17:34) (110/74 - 140/94)  BP(mean): --  RR: 18 (16 Feb 2024 17:34) (18 - 18)  SpO2: 97% (16 Feb 2024 17:34) (96% - 99%)    Parameters below as of 16 Feb 2024 17:34  Patient On (Oxygen Delivery Method): room air      CAPILLARY BLOOD GLUCOSE        I&O's Summary    15 Feb 2024 07:01  -  16 Feb 2024 07:00  --------------------------------------------------------  IN: 150 mL / OUT: 200 mL / NET: -50 mL    16 Feb 2024 07:01  -  16 Feb 2024 19:10  --------------------------------------------------------  IN: 240 mL / OUT: 300 mL / NET: -60 mL          PHYSICAL EXAM:   GENERAL: NAD, thin  HEAD:  Atraumatic, Normocephalic  EYES:  conjunctiva and sclera clear  NECK: Supple,    CHEST/LUNG: Clear to auscultation bilaterally; No wheeze  HEART: S1S2 normal. Regular rate and rhythm; No murmurs, rubs, or gallops  ABDOMEN: Soft, Nontender, Nondistended; Bowel sounds present  EXTREMITIES:  No clubbing, cyanosis, or edema  PSYCH/Neuro: AAOx3. Non-focal.   SKIN: No rashes or lesions      LABS:                        9.1    7.59  )-----------( 119      ( 16 Feb 2024 07:22 )             27.6     02-16    134<L>  |  98  |  12  ----------------------------<  88  4.1   |  25  |  0.41<L>    Ca    6.9<L>      16 Feb 2024 07:25  Phos  2.8     02-16  Mg     1.2     02-16    TPro  5.3<L>  /  Alb  2.5<L>  /  TBili  0.5  /  DBili  x   /  AST  32  /  ALT  79<H>  /  AlkPhos  96  02-15          Urinalysis Basic - ( 16 Feb 2024 07:25 )    Color: x / Appearance: x / SG: x / pH: x  Gluc: 88 mg/dL / Ketone: x  / Bili: x / Urobili: x   Blood: x / Protein: x / Nitrite: x   Leuk Esterase: x / RBC: x / WBC x   Sq Epi: x / Non Sq Epi: x / Bacteria: x          RADIOLOGY & ADDITIONAL TESTS:    Imaging Personally Reviewed:  Consultant(s) Notes Reviewed:  heme/onco, renal  Care Discussed with Consultants/Other Providers:

## 2024-02-16 NOTE — PROVIDER CONTACT NOTE (OTHER) - SITUATION
pt had 4 beats WCT HR up to 183
suspected DTI to sacrum
PAT 5.2 sec HR up to 151, PAT 8.8 sec HR up to 132
Critical Lab:  Potassium 2.8  Calcium 6.4

## 2024-02-16 NOTE — PROGRESS NOTE ADULT - ASSESSMENT
72yo f w pmh htn, breast ca, p/w generalized fatigue/weakness (x1 week in duration, a/w decreased po intake, n+v+d) leading to syncopal episode.    Breast cancer  [place] [facility]on [place] [facility] [place]ly diagnosed, right sided cT2N0 (Triple negative, w/ robust tumor infiltrating lymphocytes)  --currently on neoadjuvant treatment w/  (Cyclophosphamide, Doxorubicin, Pembrolizumab +Onpro) Last given on 02/02/24  --no systemic treatment while inpatient and/or rehab  --ongoing care after discharge    Pancytopenia  --2/2 malignancy and treatment  --transfuse for Hgb <8, platelets <10    syncopal episode  --2/2 decreased PO intake, N/V/D, and anemia  --CT head: No evidence of acute transcortical infarct, acute intracranial hemorrhage or mass effect.  --CT cervical spine: No acute fracture or traumatic subluxation.  --on tele    Electrolyte depletions   --replete as needed, management per nephrology. Phosphorus improved.   --Given low vit D-25 with elevated PTH, suspect secondary hyperparathyroidism   --2/2 N/V/D    Norovirus  --Symptomatic care   --F/u stool culture    will follow,    Edward Juan PA-C  Hematology/Oncology  [facility]  [phone] (office)

## 2024-02-16 NOTE — PROGRESS NOTE ADULT - SUBJECTIVE AND OBJECTIVE BOX
Patient is a 73y old  Female who presents with a chief complaint of generalized fatigue/weakness (16 Feb 2024 12:39)    Patient seen and examined at bedside.    MEDICATIONS  (STANDING):  ascorbic acid 500 milliGRAM(s) Oral daily  carvedilol 6.25 milliGRAM(s) Oral every 12 hours  chlorhexidine 2% Cloths 1 Application(s) Topical <User Schedule>  magnesium oxide 400 milliGRAM(s) Oral three times a day with meals  multivitamin 1 Tablet(s) Oral daily  sodium chloride 0.9% with potassium chloride 20 mEq/L 1000 milliLiter(s) (100 mL/Hr) IV Continuous <Continuous>  thiamine 100 milliGRAM(s) Oral daily    MEDICATIONS  (PRN):  acetaminophen     Tablet .. 650 milliGRAM(s) Oral every 6 hours PRN Temp greater or equal to 38C (100.4F), Mild Pain (1 - 3)  aluminum hydroxide/magnesium hydroxide/simethicone Suspension 30 milliLiter(s) Oral every 4 hours PRN Dyspepsia  melatonin 3 milliGRAM(s) Oral at bedtime PRN Insomnia  ondansetron Injectable 4 milliGRAM(s) IV Push every 8 hours PRN Nausea and/or Vomiting    Vital Signs Last 24 Hrs  T(C): 36.7 (16 Feb 2024 10:39), Max: 37.1 (15 Feb 2024 20:30)  T(F): 98.1 (16 Feb 2024 10:39), Max: 98.7 (15 Feb 2024 20:30)  HR: 90 (16 Feb 2024 10:39) (67 - 96)  BP: 136/90 (16 Feb 2024 10:39) (110/74 - 140/94)  BP(mean): --  RR: 18 (16 Feb 2024 10:39) (18 - 18)  SpO2: 99% (16 Feb 2024 10:39) (96% - 99%)    Parameters below as of 16 Feb 2024 10:39  Patient On (Oxygen Delivery Method): room air        PE  NAD  Awake, alert  Anicteric, MMM  RRR  CTAB  Abd soft, NT, ND  No c/c/e  No rash grossly  FROM                          9.1    7.59  )-----------( 119      ( 16 Feb 2024 07:22 )             27.6       02-16    134<L>  |  98  |  12  ----------------------------<  88  4.1   |  25  |  0.41<L>    Ca    6.9<L>      16 Feb 2024 07:25  Phos  2.8     02-16  Mg     1.2     02-16    TPro  5.3<L>  /  Alb  2.5<L>  /  TBili  0.5  /  DBili  x   /  AST  32  /  ALT  79<H>  /  AlkPhos  96  02-15

## 2024-02-16 NOTE — PROVIDER CONTACT NOTE (OTHER) - BACKGROUND
74 y/o F, Hx HTN, breast CA, p/w fatigue, syncope and fall. electrolyte imbalance
pt admitted for drop in hematocrit
72 YO admitted for Syncope, elevated trops, Pancytopenia
pt admitted for drop in hematocrit

## 2024-02-17 LAB
ANION GAP SERPL CALC-SCNC: 8 MMOL/L — SIGNIFICANT CHANGE UP (ref 5–17)
ANION GAP SERPL CALC-SCNC: 9 MMOL/L — SIGNIFICANT CHANGE UP (ref 5–17)
BUN SERPL-MCNC: 12 MG/DL — SIGNIFICANT CHANGE UP (ref 7–23)
BUN SERPL-MCNC: 16 MG/DL — SIGNIFICANT CHANGE UP (ref 7–23)
CA-I BLD-SCNC: 0.9 MMOL/L — LOW (ref 1.15–1.33)
CALCIUM SERPL-MCNC: 7.2 MG/DL — LOW (ref 8.4–10.5)
CALCIUM SERPL-MCNC: 7.7 MG/DL — LOW (ref 8.4–10.5)
CHLORIDE SERPL-SCNC: 96 MMOL/L — SIGNIFICANT CHANGE UP (ref 96–108)
CHLORIDE SERPL-SCNC: 97 MMOL/L — SIGNIFICANT CHANGE UP (ref 96–108)
CO2 SERPL-SCNC: 28 MMOL/L — SIGNIFICANT CHANGE UP (ref 22–31)
CO2 SERPL-SCNC: 29 MMOL/L — SIGNIFICANT CHANGE UP (ref 22–31)
CREAT SERPL-MCNC: 0.38 MG/DL — LOW (ref 0.5–1.3)
CREAT SERPL-MCNC: 0.45 MG/DL — LOW (ref 0.5–1.3)
CULTURE RESULTS: SIGNIFICANT CHANGE UP
EGFR: 100 ML/MIN/1.73M2 — SIGNIFICANT CHANGE UP
EGFR: 104 ML/MIN/1.73M2 — SIGNIFICANT CHANGE UP
GLUCOSE BLDC GLUCOMTR-MCNC: 100 MG/DL — HIGH (ref 70–99)
GLUCOSE BLDC GLUCOMTR-MCNC: 155 MG/DL — HIGH (ref 70–99)
GLUCOSE SERPL-MCNC: 103 MG/DL — HIGH (ref 70–99)
GLUCOSE SERPL-MCNC: 129 MG/DL — HIGH (ref 70–99)
HCT VFR BLD CALC: 27.5 % — LOW (ref 34.5–45)
HGB BLD-MCNC: 9.2 G/DL — LOW (ref 11.5–15.5)
MAGNESIUM SERPL-MCNC: 1.4 MG/DL — LOW (ref 1.6–2.6)
MAGNESIUM SERPL-MCNC: 1.6 MG/DL — SIGNIFICANT CHANGE UP (ref 1.6–2.6)
MCHC RBC-ENTMCNC: 30.8 PG — SIGNIFICANT CHANGE UP (ref 27–34)
MCHC RBC-ENTMCNC: 33.5 GM/DL — SIGNIFICANT CHANGE UP (ref 32–36)
MCV RBC AUTO: 92 FL — SIGNIFICANT CHANGE UP (ref 80–100)
NRBC # BLD: 0 /100 WBCS — SIGNIFICANT CHANGE UP (ref 0–0)
PHOSPHATE SERPL-MCNC: 2.5 MG/DL — SIGNIFICANT CHANGE UP (ref 2.5–4.5)
PLATELET # BLD AUTO: 168 K/UL — SIGNIFICANT CHANGE UP (ref 150–400)
POTASSIUM SERPL-MCNC: 4.1 MMOL/L — SIGNIFICANT CHANGE UP (ref 3.5–5.3)
POTASSIUM SERPL-MCNC: 4.1 MMOL/L — SIGNIFICANT CHANGE UP (ref 3.5–5.3)
POTASSIUM SERPL-SCNC: 4.1 MMOL/L — SIGNIFICANT CHANGE UP (ref 3.5–5.3)
POTASSIUM SERPL-SCNC: 4.1 MMOL/L — SIGNIFICANT CHANGE UP (ref 3.5–5.3)
RBC # BLD: 2.99 M/UL — LOW (ref 3.8–5.2)
RBC # FLD: 14.8 % — HIGH (ref 10.3–14.5)
SODIUM SERPL-SCNC: 133 MMOL/L — LOW (ref 135–145)
SODIUM SERPL-SCNC: 134 MMOL/L — LOW (ref 135–145)
SPECIMEN SOURCE: SIGNIFICANT CHANGE UP
WBC # BLD: 6.91 K/UL — SIGNIFICANT CHANGE UP (ref 3.8–10.5)
WBC # FLD AUTO: 6.91 K/UL — SIGNIFICANT CHANGE UP (ref 3.8–10.5)

## 2024-02-17 PROCEDURE — 99232 SBSQ HOSP IP/OBS MODERATE 35: CPT

## 2024-02-17 RX ORDER — HEPARIN SODIUM 5000 [USP'U]/ML
5000 INJECTION INTRAVENOUS; SUBCUTANEOUS EVERY 12 HOURS
Refills: 0 | Status: DISCONTINUED | OUTPATIENT
Start: 2024-02-17 | End: 2024-02-20

## 2024-02-17 RX ORDER — MAGNESIUM SULFATE 500 MG/ML
2 VIAL (ML) INJECTION ONCE
Refills: 0 | Status: COMPLETED | OUTPATIENT
Start: 2024-02-17 | End: 2024-02-17

## 2024-02-17 RX ORDER — CALCIUM GLUCONATE 100 MG/ML
2 VIAL (ML) INTRAVENOUS ONCE
Refills: 0 | Status: COMPLETED | OUTPATIENT
Start: 2024-02-17 | End: 2024-02-17

## 2024-02-17 RX ADMIN — CHLORHEXIDINE GLUCONATE 1 APPLICATION(S): 213 SOLUTION TOPICAL at 05:50

## 2024-02-17 RX ADMIN — MAGNESIUM OXIDE 400 MG ORAL TABLET 400 MILLIGRAM(S): 241.3 TABLET ORAL at 17:47

## 2024-02-17 RX ADMIN — CARVEDILOL PHOSPHATE 6.25 MILLIGRAM(S): 80 CAPSULE, EXTENDED RELEASE ORAL at 05:50

## 2024-02-17 RX ADMIN — Medication 25 GRAM(S): at 09:52

## 2024-02-17 RX ADMIN — Medication 200 GRAM(S): at 12:11

## 2024-02-17 RX ADMIN — LISINOPRIL 10 MILLIGRAM(S): 2.5 TABLET ORAL at 05:50

## 2024-02-17 RX ADMIN — HEPARIN SODIUM 5000 UNIT(S): 5000 INJECTION INTRAVENOUS; SUBCUTANEOUS at 17:53

## 2024-02-17 RX ADMIN — CARVEDILOL PHOSPHATE 6.25 MILLIGRAM(S): 80 CAPSULE, EXTENDED RELEASE ORAL at 17:47

## 2024-02-17 RX ADMIN — MAGNESIUM OXIDE 400 MG ORAL TABLET 400 MILLIGRAM(S): 241.3 TABLET ORAL at 08:16

## 2024-02-17 RX ADMIN — Medication 500 MILLIGRAM(S): at 12:11

## 2024-02-17 RX ADMIN — Medication 100 MILLIGRAM(S): at 12:12

## 2024-02-17 RX ADMIN — Medication 1 TABLET(S): at 12:11

## 2024-02-17 RX ADMIN — MAGNESIUM OXIDE 400 MG ORAL TABLET 400 MILLIGRAM(S): 241.3 TABLET ORAL at 12:11

## 2024-02-17 NOTE — PROGRESS NOTE ADULT - ASSESSMENT
74yo f w pmh htn, breast ca, p/w generalized fatigue/weakness (x1 week in duration, a/w decreased po intake, n+v+d) leading to syncopal episode.    Breast cancer  --under the care of Easton Eduardo of [facility] [place]  --newly diagnosed, right sided cT2N0 (Triple negative, w/ robust tumor infiltrating lymphocytes)  --currently on neoadjuvant treatment w/  (Cyclophosphamide, Doxorubicin, Pembrolizumab +Onpro) Last given on 02/02/24  --no systemic treatment while inpatient and/or rehab  --ongoing care after discharge    Pancytopenia  --2/2 malignancy and treatment  --transfuse for Hgb <8, platelets <10    syncopal episode  --2/2 decreased PO intake, N/V/D, and anemia  --CT head: No evidence of acute transcortical infarct, acute intracranial hemorrhage or mass effect.  --CT cervical spine: No acute fracture or traumatic subluxation.  --on tele    Electrolyte depletions   --replete as needed, management per nephrology. Ca 7.2  --Given low vit D-25 with elevated PTH, suspect secondary hyperparathyroidism   --2/2 N/V/D    Norovirus  --Symptomatic care   --F/u final stool culture    will follow,    Edward Juan PA-C  Hematology/Oncology  [facility]  [phone] (office)

## 2024-02-17 NOTE — PROGRESS NOTE ADULT - PROBLEM SELECTOR PLAN 1
trauma work up with no acute significant findings  neuroimaging with no acute significant findings  ekg with no new st seg - t wave changes suggestive of acute ischemia - shows sinus tach  trop 77->67, suspect demand and/or decreased clearance  -TTE reassuring, EF 54% and no wall motion abnormalities.   follow up tsh, folate, b12, rpr; orthostatic vitals, tte; ua +/- uc, rvp + cxr; ruq us + hepatitis panel neg  frequent neurochecks  monitor on telemetry  maintain fall + frac, delirium, seizure, aspiration precautions; keep head end of bed elevated  nutrition consult appreciated.   Consider dronabinol for poor po intake. -d/w patient/family today, but she will consider.   pt/ot eval + sw/cm consult for disposition

## 2024-02-17 NOTE — PROGRESS NOTE ADULT - SUBJECTIVE AND OBJECTIVE BOX
Patient is a 73y old  Female who presents with a chief complaint of generalized fatigue/weakness (17 Feb 2024 13:52)        SUBJECTIVE / OVERNIGHT EVENTS: patient had diarrhea again this morning. no pain. breathing ok.       MEDICATIONS  (STANDING):  ascorbic acid 500 milliGRAM(s) Oral daily  carvedilol 6.25 milliGRAM(s) Oral every 12 hours  chlorhexidine 2% Cloths 1 Application(s) Topical <User Schedule>  heparin   Injectable 5000 Unit(s) SubCutaneous every 12 hours  lisinopril 10 milliGRAM(s) Oral daily  magnesium oxide 400 milliGRAM(s) Oral three times a day with meals  multivitamin 1 Tablet(s) Oral daily  sodium chloride 0.9% with potassium chloride 20 mEq/L 1000 milliLiter(s) (100 mL/Hr) IV Continuous <Continuous>  thiamine 100 milliGRAM(s) Oral daily    MEDICATIONS  (PRN):  acetaminophen     Tablet .. 650 milliGRAM(s) Oral every 6 hours PRN Temp greater or equal to 38C (100.4F), Mild Pain (1 - 3)  aluminum hydroxide/magnesium hydroxide/simethicone Suspension 30 milliLiter(s) Oral every 4 hours PRN Dyspepsia  melatonin 3 milliGRAM(s) Oral at bedtime PRN Insomnia  ondansetron Injectable 4 milliGRAM(s) IV Push every 8 hours PRN Nausea and/or Vomiting      Vital Signs Last 24 Hrs  T(C): 36.8 (17 Feb 2024 11:01), Max: 36.9 (17 Feb 2024 04:34)  T(F): 98.2 (17 Feb 2024 11:01), Max: 98.5 (17 Feb 2024 04:34)  HR: 90 (17 Feb 2024 17:40) (90 - 96)  BP: 129/89 (17 Feb 2024 17:40) (94/62 - 137/91)  BP(mean): --  RR: 18 (17 Feb 2024 11:01) (17 - 18)  SpO2: 94% (17 Feb 2024 11:01) (94% - 95%)    Parameters below as of 17 Feb 2024 11:01  Patient On (Oxygen Delivery Method): room air      CAPILLARY BLOOD GLUCOSE      POCT Blood Glucose.: 100 mg/dL (17 Feb 2024 16:45)    I&O's Summary    16 Feb 2024 07:01  -  17 Feb 2024 07:00  --------------------------------------------------------  IN: 720 mL / OUT: 1500 mL / NET: -780 mL    17 Feb 2024 07:01  -  17 Feb 2024 19:13  --------------------------------------------------------  IN: 240 mL / OUT: 400 mL / NET: -160 mL          PHYSICAL EXAM:   GENERAL: NAD, thin  HEAD:  Atraumatic, Normocephalic  EYES:  conjunctiva and sclera clear  NECK: Supple   CHEST/LUNG: Clear to auscultation bilaterally; No wheeze  HEART: S1S2 normal. Regular rate and rhythm; No murmurs, rubs, or gallops  ABDOMEN: Soft, Nontender, Nondistended; Bowel sounds present  EXTREMITIES:   No clubbing, cyanosis, or edema  PSYCH/Neuro: AAOx3. Non-focal.   SKIN: No rashes or lesions      LABS:                        9.2    6.91  )-----------( 168      ( 17 Feb 2024 08:28 )             27.5     02-17    134<L>  |  96  |  12  ----------------------------<  103<H>  4.1   |  29  |  0.45<L>    Ca    7.2<L>      17 Feb 2024 08:28  Phos  2.5     02-17  Mg     1.4     02-17            Urinalysis Basic - ( 17 Feb 2024 08:28 )    Color: x / Appearance: x / SG: x / pH: x  Gluc: 103 mg/dL / Ketone: x  / Bili: x / Urobili: x   Blood: x / Protein: x / Nitrite: x   Leuk Esterase: x / RBC: x / WBC x   Sq Epi: x / Non Sq Epi: x / Bacteria: x          RADIOLOGY & ADDITIONAL TESTS:    Imaging Personally Reviewed:  Consultant(s) Notes Reviewed:  heme  Care Discussed with Consultants/Other Providers:

## 2024-02-17 NOTE — PROGRESS NOTE ADULT - SUBJECTIVE AND OBJECTIVE BOX
Patient is a 73y old  Female who presents with a chief complaint of generalized fatigue/weakness (16 Feb 2024 12:52)    Patient seen and examined at bedside.     MEDICATIONS  (STANDING):  ascorbic acid 500 milliGRAM(s) Oral daily  carvedilol 6.25 milliGRAM(s) Oral every 12 hours  chlorhexidine 2% Cloths 1 Application(s) Topical <User Schedule>  heparin   Injectable 5000 Unit(s) SubCutaneous every 12 hours  lisinopril 10 milliGRAM(s) Oral daily  magnesium oxide 400 milliGRAM(s) Oral three times a day with meals  multivitamin 1 Tablet(s) Oral daily  sodium chloride 0.9% with potassium chloride 20 mEq/L 1000 milliLiter(s) (100 mL/Hr) IV Continuous <Continuous>  thiamine 100 milliGRAM(s) Oral daily    MEDICATIONS  (PRN):  acetaminophen     Tablet .. 650 milliGRAM(s) Oral every 6 hours PRN Temp greater or equal to 38C (100.4F), Mild Pain (1 - 3)  aluminum hydroxide/magnesium hydroxide/simethicone Suspension 30 milliLiter(s) Oral every 4 hours PRN Dyspepsia  melatonin 3 milliGRAM(s) Oral at bedtime PRN Insomnia  ondansetron Injectable 4 milliGRAM(s) IV Push every 8 hours PRN Nausea and/or Vomiting    Vital Signs Last 24 Hrs  T(C): 36.8 (17 Feb 2024 11:01), Max: 36.9 (17 Feb 2024 04:34)  T(F): 98.2 (17 Feb 2024 11:01), Max: 98.5 (17 Feb 2024 04:34)  HR: 95 (17 Feb 2024 11:01) (92 - 96)  BP: 94/62 (17 Feb 2024 11:01) (94/62 - 137/91)  BP(mean): --  RR: 18 (17 Feb 2024 11:01) (17 - 18)  SpO2: 94% (17 Feb 2024 11:01) (94% - 97%)    Parameters below as of 17 Feb 2024 11:01  Patient On (Oxygen Delivery Method): room air        PE  NAD  Awake, alert  Anicteric, MMM  RRR  CTAB  Abd soft, NT, ND  No c/c/e  No rash grossly  FROM                          9.2    6.91  )-----------( 168      ( 17 Feb 2024 08:28 )             27.5       02-17    134<L>  |  96  |  12  ----------------------------<  103<H>  4.1   |  29  |  0.45<L>    Ca    7.2<L>      17 Feb 2024 08:28  Phos  2.5     02-17  Mg     1.4     02-17

## 2024-02-18 ENCOUNTER — TRANSCRIPTION ENCOUNTER (OUTPATIENT)
Age: 74
End: 2024-02-18

## 2024-02-18 LAB
ANION GAP SERPL CALC-SCNC: 9 MMOL/L — SIGNIFICANT CHANGE UP (ref 5–17)
BUN SERPL-MCNC: 12 MG/DL — SIGNIFICANT CHANGE UP (ref 7–23)
CA-I BLD-SCNC: 0.9 MMOL/L — LOW (ref 1.15–1.33)
CALCIUM SERPL-MCNC: 8.2 MG/DL — LOW (ref 8.4–10.5)
CHLORIDE SERPL-SCNC: 97 MMOL/L — SIGNIFICANT CHANGE UP (ref 96–108)
CO2 SERPL-SCNC: 28 MMOL/L — SIGNIFICANT CHANGE UP (ref 22–31)
CREAT SERPL-MCNC: 0.42 MG/DL — LOW (ref 0.5–1.3)
EGFR: 102 ML/MIN/1.73M2 — SIGNIFICANT CHANGE UP
GLUCOSE SERPL-MCNC: 93 MG/DL — SIGNIFICANT CHANGE UP (ref 70–99)
HCT VFR BLD CALC: 27.2 % — LOW (ref 34.5–45)
HGB BLD-MCNC: 8.9 G/DL — LOW (ref 11.5–15.5)
MAGNESIUM SERPL-MCNC: 2 MG/DL — SIGNIFICANT CHANGE UP (ref 1.6–2.6)
MCHC RBC-ENTMCNC: 30.3 PG — SIGNIFICANT CHANGE UP (ref 27–34)
MCHC RBC-ENTMCNC: 32.7 GM/DL — SIGNIFICANT CHANGE UP (ref 32–36)
MCV RBC AUTO: 92.5 FL — SIGNIFICANT CHANGE UP (ref 80–100)
NRBC # BLD: 0 /100 WBCS — SIGNIFICANT CHANGE UP (ref 0–0)
PHOSPHATE SERPL-MCNC: 2.9 MG/DL — SIGNIFICANT CHANGE UP (ref 2.5–4.5)
PLATELET # BLD AUTO: 213 K/UL — SIGNIFICANT CHANGE UP (ref 150–400)
POTASSIUM SERPL-MCNC: 4.2 MMOL/L — SIGNIFICANT CHANGE UP (ref 3.5–5.3)
POTASSIUM SERPL-SCNC: 4.2 MMOL/L — SIGNIFICANT CHANGE UP (ref 3.5–5.3)
RBC # BLD: 2.94 M/UL — LOW (ref 3.8–5.2)
RBC # FLD: 14.6 % — HIGH (ref 10.3–14.5)
SODIUM SERPL-SCNC: 134 MMOL/L — LOW (ref 135–145)
WBC # BLD: 6.49 K/UL — SIGNIFICANT CHANGE UP (ref 3.8–10.5)
WBC # FLD AUTO: 6.49 K/UL — SIGNIFICANT CHANGE UP (ref 3.8–10.5)

## 2024-02-18 PROCEDURE — 99232 SBSQ HOSP IP/OBS MODERATE 35: CPT | Mod: GC

## 2024-02-18 RX ORDER — MAGNESIUM SULFATE 500 MG/ML
1 VIAL (ML) INJECTION ONCE
Refills: 0 | Status: COMPLETED | OUTPATIENT
Start: 2024-02-18 | End: 2024-02-18

## 2024-02-18 RX ORDER — CALCIUM CARBONATE 500(1250)
1 TABLET ORAL DAILY
Refills: 0 | Status: DISCONTINUED | OUTPATIENT
Start: 2024-02-18 | End: 2024-02-20

## 2024-02-18 RX ORDER — MAGNESIUM OXIDE 400 MG ORAL TABLET 241.3 MG
1 TABLET ORAL
Qty: 90 | Refills: 0
Start: 2024-02-18 | End: 2024-03-18

## 2024-02-18 RX ORDER — CALCIUM GLUCONATE 100 MG/ML
1 VIAL (ML) INTRAVENOUS ONCE
Refills: 0 | Status: COMPLETED | OUTPATIENT
Start: 2024-02-18 | End: 2024-02-18

## 2024-02-18 RX ADMIN — Medication 500 MILLIGRAM(S): at 15:19

## 2024-02-18 RX ADMIN — CARVEDILOL PHOSPHATE 6.25 MILLIGRAM(S): 80 CAPSULE, EXTENDED RELEASE ORAL at 18:26

## 2024-02-18 RX ADMIN — MAGNESIUM OXIDE 400 MG ORAL TABLET 400 MILLIGRAM(S): 241.3 TABLET ORAL at 18:27

## 2024-02-18 RX ADMIN — Medication 100 GRAM(S): at 11:35

## 2024-02-18 RX ADMIN — LISINOPRIL 10 MILLIGRAM(S): 2.5 TABLET ORAL at 05:39

## 2024-02-18 RX ADMIN — HEPARIN SODIUM 5000 UNIT(S): 5000 INJECTION INTRAVENOUS; SUBCUTANEOUS at 18:27

## 2024-02-18 RX ADMIN — Medication 100 GRAM(S): at 02:28

## 2024-02-18 RX ADMIN — Medication 1 TABLET(S): at 15:19

## 2024-02-18 RX ADMIN — Medication 100 MILLIGRAM(S): at 15:20

## 2024-02-18 RX ADMIN — MAGNESIUM OXIDE 400 MG ORAL TABLET 400 MILLIGRAM(S): 241.3 TABLET ORAL at 15:18

## 2024-02-18 RX ADMIN — HEPARIN SODIUM 5000 UNIT(S): 5000 INJECTION INTRAVENOUS; SUBCUTANEOUS at 05:39

## 2024-02-18 RX ADMIN — CARVEDILOL PHOSPHATE 6.25 MILLIGRAM(S): 80 CAPSULE, EXTENDED RELEASE ORAL at 05:39

## 2024-02-18 RX ADMIN — MAGNESIUM OXIDE 400 MG ORAL TABLET 400 MILLIGRAM(S): 241.3 TABLET ORAL at 09:37

## 2024-02-18 RX ADMIN — Medication 1 TABLET(S): at 18:24

## 2024-02-18 RX ADMIN — Medication 100 GRAM(S): at 03:29

## 2024-02-18 NOTE — DISCHARGE NOTE PROVIDER - CARE PROVIDERS DIRECT ADDRESSES
Problem: Adult Inpatient Plan of Care  Goal: Plan of Care Review  Outcome: Ongoing, Progressing     Problem: Adult Inpatient Plan of Care  Goal: Patient-Specific Goal (Individualized)  Outcome: Ongoing, Progressing     Problem: Diabetes Comorbidity  Goal: Blood Glucose Level Within Targeted Range  Outcome: Ongoing, Progressing     Problem: Pain Acute  Goal: Acceptable Pain Control and Functional Ability  Outcome: Ongoing, Progressing     Problem: Respiratory Compromise (Pneumothorax)  Goal: Optimal Oxygenation and Ventilation  Outcome: Ongoing, Progressing      ,DirectAddress_Unknown ,DirectAddress_Unknown,DirectAddress_Unknown

## 2024-02-18 NOTE — PROGRESS NOTE ADULT - ASSESSMENT
72yo f w pmh htn, breast ca, p/w generalized fatigue/weakness leading to fall, unclear etiology; in er, found to have multiple metabolic disturbances suggestive of hypovolemia; admit to medicine for further mgmt

## 2024-02-18 NOTE — DISCHARGE NOTE PROVIDER - NSDCCPCAREPLAN_GEN_ALL_CORE_FT
PRINCIPAL DISCHARGE DIAGNOSIS  Diagnosis: Syncope  Assessment and Plan of Treatment: HOME CARE INSTRUCTIONS  Have someone stay with you until you feel stable.  Do not drive, operate machinery, or play sports until your caregiver says it is okay.  Keep all follow-up appointments as directed by your caregiver.   Lie down right away if you start feeling like you might faint. Breathe deeply and steadily. Wait until all the symptoms have passed.Drink enough fluids to keep your urine clear or pale yellow.  If you are taking blood pressure or heart medicine, get up slowly, taking several minutes to sit and then stand. This can reduce dizziness.  SEEK IMMEDIATE MEDICAL CARE IF:  You have a severe headache.  You have unusual pain in the chest, abdomen, or back.  You are bleeding from the mouth or rectum, or you have black or tarry stool.  You have an irregular or very fast heartbeat.  You have pain with breathing.  You have repeated fainting or seizure-like jerking during an episode.  You faint when sitting or lying down.  You have confusion.  You have difficulty walking.  You have severe weakness.  You have vision problems.  If you fainted, call your local emergency services (_____________________). Do not drive yourself to the hospital        SECONDARY DISCHARGE DIAGNOSES  Diagnosis: Disorders of fluid, electrolyte, and acid-base balance  Assessment and Plan of Treatment: continue with vitamins and supplementation as prescribed  follow up with PCP outpatient    Diagnosis: Breast CA  Assessment and Plan of Treatment: follow up with [facility] [place] [facility] outpt

## 2024-02-18 NOTE — DISCHARGE NOTE PROVIDER - NSDCMRMEDTOKEN_GEN_ALL_CORE_FT
carvedilol 6.25 mg oral tablet: 1 tab(s) orally 2 times a day  DilTIAZem (Eqv-Tiazac) 360 mg/24 hours oral capsule, extended release: 1 cap(s) orally once a day  hydroCHLOROthiazide 25 mg oral tablet: 1 tab(s) orally once a day  ramipril 2.5 mg oral capsule: 1 cap(s) orally once a day   carvedilol 6.25 mg oral tablet: 1 tab(s) orally 2 times a day  ramipril 2.5 mg oral capsule: 1 cap(s) orally once a day

## 2024-02-18 NOTE — DISCHARGE NOTE PROVIDER - NSDCFUADDINST_GEN_ALL_CORE_FT
Make appointment(s) to follow up with your Healthcare Provider(s) - bring all discharge paperwork including discharge medication list to follow up appointment.  When making appointment, inform office you have been recently discharged from the hospital.

## 2024-02-18 NOTE — PROGRESS NOTE ADULT - SUBJECTIVE AND OBJECTIVE BOX
Patient is a 73y old  Female who presents with a chief complaint of generalized fatigue/weakness (18 Feb 2024 16:03)        SUBJECTIVE / OVERNIGHT EVENTS: per patient and RN, no diarrhea today. feels better. eating better.       MEDICATIONS  (STANDING):  ascorbic acid 500 milliGRAM(s) Oral daily  calcium carbonate   1250 mG (OsCal) 1 Tablet(s) Oral daily  carvedilol 6.25 milliGRAM(s) Oral every 12 hours  chlorhexidine 2% Cloths 1 Application(s) Topical <User Schedule>  heparin   Injectable 5000 Unit(s) SubCutaneous every 12 hours  lisinopril 10 milliGRAM(s) Oral daily  magnesium oxide 400 milliGRAM(s) Oral three times a day with meals  multivitamin 1 Tablet(s) Oral daily  sodium chloride 0.9% with potassium chloride 20 mEq/L 1000 milliLiter(s) (100 mL/Hr) IV Continuous <Continuous>  thiamine 100 milliGRAM(s) Oral daily    MEDICATIONS  (PRN):  acetaminophen     Tablet .. 650 milliGRAM(s) Oral every 6 hours PRN Temp greater or equal to 38C (100.4F), Mild Pain (1 - 3)  aluminum hydroxide/magnesium hydroxide/simethicone Suspension 30 milliLiter(s) Oral every 4 hours PRN Dyspepsia  melatonin 3 milliGRAM(s) Oral at bedtime PRN Insomnia  ondansetron Injectable 4 milliGRAM(s) IV Push every 8 hours PRN Nausea and/or Vomiting      Vital Signs Last 24 Hrs  T(C): 36.1 (18 Feb 2024 11:30), Max: 36.8 (18 Feb 2024 04:31)  T(F): 97 (18 Feb 2024 11:30), Max: 98.3 (18 Feb 2024 04:31)  HR: 88 (18 Feb 2024 11:30) (88 - 89)  BP: 137/86 (18 Feb 2024 11:30) (124/76 - 137/86)  BP(mean): --  RR: 18 (18 Feb 2024 11:30) (18 - 18)  SpO2: 95% (18 Feb 2024 11:30) (94% - 95%)    Parameters below as of 18 Feb 2024 11:30  Patient On (Oxygen Delivery Method): room air      CAPILLARY BLOOD GLUCOSE      POCT Blood Glucose.: 155 mg/dL (17 Feb 2024 21:41)    I&O's Summary    17 Feb 2024 07:01  -  18 Feb 2024 07:00  --------------------------------------------------------  IN: 240 mL / OUT: 1000 mL / NET: -760 mL          PHYSICAL EXAM:   GENERAL: NAD, thin  HEAD:  Atraumatic, Normocephalic  EYES:   conjunctiva and sclera clear  NECK: Supple,   CHEST/LUNG: Clear to auscultation bilaterally; No wheeze  HEART: S1S2 normal. Regular rate and rhythm; No murmurs, rubs, or gallops  ABDOMEN: Soft, Nontender, Nondistended; Bowel sounds present  EXTREMITIES:   No clubbing, cyanosis, or edema  PSYCH/Neuro: AAOx3. Non-focal.   SKIN: No rashes or lesions      LABS:                        8.9    6.49  )-----------( 213      ( 18 Feb 2024 05:09 )             27.2     02-18    134<L>  |  97  |  12  ----------------------------<  93  4.2   |  28  |  0.42<L>    Ca    8.2<L>      18 Feb 2024 05:09  Phos  2.9     02-18  Mg     2.0     02-18            Urinalysis Basic - ( 18 Feb 2024 05:09 )    Color: x / Appearance: x / SG: x / pH: x  Gluc: 93 mg/dL / Ketone: x  / Bili: x / Urobili: x   Blood: x / Protein: x / Nitrite: x   Leuk Esterase: x / RBC: x / WBC x   Sq Epi: x / Non Sq Epi: x / Bacteria: x          RADIOLOGY & ADDITIONAL TESTS:    Imaging Personally Reviewed:  Consultant(s) Notes Reviewed:  heme/onco  Care Discussed with Consultants/Other Providers:

## 2024-02-18 NOTE — PROGRESS NOTE ADULT - ASSESSMENT
74yo f w pmh htn, breast ca, p/w generalized fatigue/weakness (x1 week in duration, a/w decreased po intake, n+v+d) leading to syncopal episode.    Breast cancer  --under the care of Easton Eduardo of [facility]  --newly diagnosed, right sided cT2N0 (Triple negative, w/ robust tumor infiltrating lymphocytes)  --currently on neoadjuvant treatment w/  (Cyclophosphamide, Doxorubicin, Pembrolizumab +Onpro) Last given on 02/02/24  --no systemic treatment while inpatient and/or rehab  --ongoing care after discharge    Pancytopenia  --2/2 malignancy and treatment  --transfuse for Hgb <8, platelets <10    syncopal episode  --2/2 decreased PO intake, N/V/D, and anemia  --CT head: No evidence of acute transcortical infarct, acute intracranial hemorrhage or mass effect.  --CT cervical spine: No acute fracture or traumatic subluxation.  --on tele    Electrolyte depletions   --Electrolytes improving, receiving calcium gluconate today  --Given low vit D-25 with elevated PTH, suspect secondary hyperparathyroidism   --2/2 N/V/D    Norovirus  --Symptomatic care   --Diarrhea improved    will follow,    Edward Juan PA-C  Hematology/Oncology  [facility]  [phone] (office)

## 2024-02-18 NOTE — DISCHARGE NOTE PROVIDER - PROVIDER TOKENS
PROVIDER:[TOKEN:[[id number]]] PROVIDER:[TOKEN:[[id number]]],FREE:[LAST:[Dr Macias],PHONE:[(   )    -],FAX:[(   )    -],ADDRESS:[[facility] [place] [facility]

## 2024-02-18 NOTE — DISCHARGE NOTE PROVIDER - HOSPITAL COURSE
Pt presented after syncopal episode. Trauma work up with no acute significant findings and neuroimaging with no acute significant findings. EKG with no new st seg - t wave changes suggestive of acute ischemia - shows sinus tach. Trop 77->67, suspect demand and/or decreased clearance. TTE reassuring, EF 54% and no wall motion abnormalities. Pt seen with electrolyte abnormalities- hypona, hypoCl, hypoK, hypoMg + hypoPhos, hypoCa, marissa w bun/cr > 20:1; consistent with dehydration + hypovolemia. Pt s/p 1 L NS and electrolyte supplementation, now WNL and home HCTZ held. Remains on PO mag ox. Pt with reported diarrhea, stool study with positive norovirus, supportive care. Pt with pancytopenia: severe anemia, moderate thrombocytopenia, moderate neutropenia, unclear baseling hgb, no gross bleeding observed. Pt s/p 1 un PRBC in ER. Per conversation between ER and [facility], suspect 2/2 recent chemo. Evaled by heme onc and rec for _________________.       Breast cancer  --under the care of [facility]on Eduardo [place] [facility]  --newly diagnosed, right sided cT2N0 (Triple negative, w/ robust tumor infiltrating lymphocytes)  --currently on neoadjuvant treatment w/  (Cyclophosphamide, Doxorubicin, Pembrolizumab +Onpro) Last given on 02/02/24  --no systemic treatment while inpatient and/or rehab  --ongoing care after discharge    Pancytopenia  --2/2 malignancy and treatment  --transfuse for Hgb <8, platelets <10    syncopal episode  --2/2 decreased PO intake, N/V/D, and anemia  --CT head: No evidence of acute transcortical infarct, acute intracranial hemorrhage or mass effect.  --CT cervical spine: No acute fracture or traumatic subluxation.  --on tele   HPI:  74yo f w pmh htn, breast ca, p/w generalized fatigue/weakness (x1 week in duration, a/w decreased po intake, n+v+d) leading to fall; unwitnessed, +head strike, not on blood thinners, unclear whether a/w loc; patient's family member found patient on floor early this morning; contacted outpatient provider who recommended patient go to er for further evaluation. reportedly, patient recently underwent chemo for her breast ca. in er, found to have multiple metabolic disturbances suggestive of hypovolemia; admit to medicine for further mgmt     Hospital Course:  Pt presented after syncopal episode. Trauma work up with no acute significant findings and neuroimaging with no acute significant findings. EKG with no new st seg - t wave changes suggestive of acute ischemia - shows sinus tach. Trop 77->67, suspect demand and/or decreased clearance. TTE reassuring, EF 54% and no wall motion abnormalities. Pt seen with electrolyte abnormalities- hypona, hypoCl, hypoK, hypoMg + hypoPhos, hypoCa, taty w bun/cr > 20:1; consistent with dehydration + hypovolemia. Evaluated by nephrology and recommended for supplementation, fluids and increased PO intake.  Pt s/p 1 L NS and electrolyte supplementation, now labs WNL and home HCTZ held. Remains on PO mag ox daily. Pt with reported diarrhea, stool study with positive norovirus, supportive care. Pt with pancytopenia: severe anemia, moderate thrombocytopenia, moderate neutropenia, unclear baseling hgb, no gross bleeding observed. Pt s/p 1 un PRBC in ER. Per conversation between ER and [facility], suspect 2/2 recent chemo. Evaled by heme onc, pt seen by heme onc at [facility] for newly diagnosed breast cancer, right sided cT2N0 (Triple negative, w/ robust tumor infiltrating lymphocytes) currently on neoadjuvant treatment w/  (Cyclophosphamide, Doxorubicin, Pembrolizumab +Onpro) Last given on 02/02/24. Pt received no systemic treatment while inpatient and recommended to follow up with [facility] as outpatient and to follow up with PCP.    Important Medication Changes and Reason:  -     Active or Pending Issues Requiring Follow-up:  - Follow up with PCP Dr. Orellana   -Follow up with heme onc Dr. Macias     Advanced Directives:   [X] Full code  [ ] DNR  [ ] Hospice    Discharge Diagnoses:  - Syncope  -TATY  - Electrolyte disturbance   - Norovirus  -Breast Ca         Discharge/Dispo/Med rec discussed with attending  ____. Patient medically cleared for discharge Home w/ Homecare with outpatient follow up with PCP and heme/onc                HPI:  72yo f w pmh htn, breast ca, p/w generalized fatigue/weakness (x1 week in duration, a/w decreased po intake, n+v+d) leading to fall; unwitnessed, +head strike, not on blood thinners, unclear whether a/w loc; patient's family member found patient on floor early this morning; contacted outpatient provider who recommended patient go to er for further evaluation. reportedly, patient recently underwent chemo for her breast ca. in er, found to have multiple metabolic disturbances suggestive of hypovolemia; admit to medicine for further mgmt     Hospital Course:  Pt presented after syncopal episode. Trauma work up with no acute significant findings and neuroimaging with no acute significant findings. EKG with no new st seg - t wave changes suggestive of acute ischemia - shows sinus tach. Trop 77->67, suspect demand and/or decreased clearance. TTE reassuring, EF 54% and no wall motion abnormalities. Pt seen with electrolyte abnormalities- hypona, hypoCl, hypoK, hypoMg + hypoPhos, hypoCa, taty w bun/cr > 20:1; consistent with dehydration + hypovolemia. Evaluated by nephrology and recommended for supplementation, fluids and increased PO intake.  Pt s/p 1 L NS and electrolyte supplementation, now labs WNL and home HCTZ held. Remains on PO mag ox daily. Pt with reported diarrhea, stool study with positive norovirus, supportive care. Pt with pancytopenia: severe anemia, moderate thrombocytopenia, moderate neutropenia, unclear baseling hgb, no gross bleeding observed. Pt s/p 1 un PRBC in ER. Per conversation between ER and [facility], suspect 2/2 recent chemo. Evaled by heme onc, pt seen by heme onc at [facility] for newly diagnosed breast cancer, right sided cT2N0 (Triple negative, w/ robust tumor infiltrating lymphocytes) currently on neoadjuvant treatment w/  (Cyclophosphamide, Doxorubicin, Pembrolizumab +Onpro) Last given on 02/02/24. Pt received no systemic treatment while inpatient and recommended to follow up with [facility] as outpatient and to follow up with PCP.    Important Medication Changes and Reason:  -     Active or Pending Issues Requiring Follow-up:  - Follow up with PCP Dr. Orellana   -Follow up with heme onc Dr. Macias     Advanced Directives:   [X] Full code  [ ] DNR  [ ] Hospice    Discharge Diagnoses:  - Syncope  -TATY  - Electrolyte disturbance   - Norovirus  -Breast Ca         Discharge/Dispo/Med rec discussed with attending Dr. Navas. Patient medically cleared for discharge Home w/ Homecare with outpatient follow up with PCP and heme/onc

## 2024-02-18 NOTE — DISCHARGE NOTE PROVIDER - NSDCFUADDAPPT_GEN_ALL_CORE_FT
APPTS ARE READY TO BE MADE: [X] YES    Best Family or Patient Contact (if needed):    Additional Information about above appointments (if needed):    1: Follow up with PCP Dr. Orellana   2: Follow up with heme onc from [facility] Dr. Easton Macias       Other comments or requests:    APPTS ARE READY TO BE MADE: [X] YES    Best Family or Patient Contact (if needed):    Additional Information about above appointments (if needed):    1: Follow up with PCP Dr. Orellana   2: Follow up with heme onc from [facility] Dr. Easton Macias       Other comments or requests:       Provided patient with provider referral information, however patient prefers to schedule the appointments on their own.

## 2024-02-18 NOTE — DISCHARGE NOTE PROVIDER - CARE PROVIDER_API CALL
Bernardo Orellana.  Emergency Medicine  [address] [place] [address]  Phone: [phone]  Fax: [phone]  Follow Up Time:    Bernardo Orellana  Emergency Medicine  [address] [place] [id number]  Phone: [phone]  Fax: [phone]  Follow Up Time:     Dr Macias,   [facility]  Phone: (   )    -  Fax: (   )    -  Follow Up Time:

## 2024-02-18 NOTE — PROGRESS NOTE ADULT - SUBJECTIVE AND OBJECTIVE BOX
Patient is a 73y old  Female who presents with a chief complaint of generalized fatigue/weakness (17 Feb 2024 13:52)    Pt seen and examined at bedside, feeling well. Diarrhea improved.    MEDICATIONS  (STANDING):  ascorbic acid 500 milliGRAM(s) Oral daily  calcium gluconate IVPB 1 Gram(s) IV Intermittent once  carvedilol 6.25 milliGRAM(s) Oral every 12 hours  chlorhexidine 2% Cloths 1 Application(s) Topical <User Schedule>  heparin   Injectable 5000 Unit(s) SubCutaneous every 12 hours  lisinopril 10 milliGRAM(s) Oral daily  magnesium oxide 400 milliGRAM(s) Oral three times a day with meals  multivitamin 1 Tablet(s) Oral daily  sodium chloride 0.9% with potassium chloride 20 mEq/L 1000 milliLiter(s) (100 mL/Hr) IV Continuous <Continuous>  thiamine 100 milliGRAM(s) Oral daily    MEDICATIONS  (PRN):  acetaminophen     Tablet .. 650 milliGRAM(s) Oral every 6 hours PRN Temp greater or equal to 38C (100.4F), Mild Pain (1 - 3)  aluminum hydroxide/magnesium hydroxide/simethicone Suspension 30 milliLiter(s) Oral every 4 hours PRN Dyspepsia  melatonin 3 milliGRAM(s) Oral at bedtime PRN Insomnia  ondansetron Injectable 4 milliGRAM(s) IV Push every 8 hours PRN Nausea and/or Vomiting    Vital Signs Last 24 Hrs  T(C): 36.8 (18 Feb 2024 04:31), Max: 36.8 (18 Feb 2024 04:31)  T(F): 98.3 (18 Feb 2024 04:31), Max: 98.3 (18 Feb 2024 04:31)  HR: 89 (18 Feb 2024 04:31) (88 - 90)  BP: 124/76 (18 Feb 2024 04:31) (124/76 - 129/89)  BP(mean): --  RR: 18 (18 Feb 2024 04:31) (18 - 18)  SpO2: 94% (18 Feb 2024 04:31) (94% - 95%)    Parameters below as of 18 Feb 2024 04:31  Patient On (Oxygen Delivery Method): room air    PE  NAD  Awake, alert  Anicteric, MMM  No c/c/e  No rash grossly  FROM                          8.9    6.49  )-----------( 213      ( 18 Feb 2024 05:09 )             27.2       02-18    134<L>  |  97  |  12  ----------------------------<  93  4.2   |  28  |  0.42<L>    Ca    8.2<L>      18 Feb 2024 05:09  Phos  2.9     02-18  Mg     2.0     02-18

## 2024-02-19 ENCOUNTER — TRANSCRIPTION ENCOUNTER (OUTPATIENT)
Age: 74
End: 2024-02-19

## 2024-02-19 LAB
ANION GAP SERPL CALC-SCNC: 9 MMOL/L — SIGNIFICANT CHANGE UP (ref 5–17)
BUN SERPL-MCNC: 17 MG/DL — SIGNIFICANT CHANGE UP (ref 7–23)
CA-I BLD-SCNC: 1.08 MMOL/L — LOW (ref 1.15–1.33)
CALCIUM SERPL-MCNC: 8.6 MG/DL — SIGNIFICANT CHANGE UP (ref 8.4–10.5)
CHLORIDE SERPL-SCNC: 97 MMOL/L — SIGNIFICANT CHANGE UP (ref 96–108)
CO2 SERPL-SCNC: 30 MMOL/L — SIGNIFICANT CHANGE UP (ref 22–31)
CREAT SERPL-MCNC: 0.42 MG/DL — LOW (ref 0.5–1.3)
EGFR: 102 ML/MIN/1.73M2 — SIGNIFICANT CHANGE UP
GLUCOSE SERPL-MCNC: 100 MG/DL — HIGH (ref 70–99)
HCT VFR BLD CALC: 28.4 % — LOW (ref 34.5–45)
HGB BLD-MCNC: 9 G/DL — LOW (ref 11.5–15.5)
MAGNESIUM SERPL-MCNC: 1.6 MG/DL — SIGNIFICANT CHANGE UP (ref 1.6–2.6)
MCHC RBC-ENTMCNC: 30.1 PG — SIGNIFICANT CHANGE UP (ref 27–34)
MCHC RBC-ENTMCNC: 31.7 GM/DL — LOW (ref 32–36)
MCV RBC AUTO: 95 FL — SIGNIFICANT CHANGE UP (ref 80–100)
NRBC # BLD: 0 /100 WBCS — SIGNIFICANT CHANGE UP (ref 0–0)
PHOSPHATE SERPL-MCNC: 2.6 MG/DL — SIGNIFICANT CHANGE UP (ref 2.5–4.5)
PLATELET # BLD AUTO: 300 K/UL — SIGNIFICANT CHANGE UP (ref 150–400)
POTASSIUM SERPL-MCNC: 4.1 MMOL/L — SIGNIFICANT CHANGE UP (ref 3.5–5.3)
POTASSIUM SERPL-SCNC: 4.1 MMOL/L — SIGNIFICANT CHANGE UP (ref 3.5–5.3)
RBC # BLD: 2.99 M/UL — LOW (ref 3.8–5.2)
RBC # FLD: 14.6 % — HIGH (ref 10.3–14.5)
SODIUM SERPL-SCNC: 136 MMOL/L — SIGNIFICANT CHANGE UP (ref 135–145)
WBC # BLD: 7.09 K/UL — SIGNIFICANT CHANGE UP (ref 3.8–10.5)
WBC # FLD AUTO: 7.09 K/UL — SIGNIFICANT CHANGE UP (ref 3.8–10.5)

## 2024-02-19 PROCEDURE — 99239 HOSP IP/OBS DSCHRG MGMT >30: CPT

## 2024-02-19 RX ORDER — HYDROCHLOROTHIAZIDE 25 MG
1 TABLET ORAL
Refills: 0 | DISCHARGE

## 2024-02-19 RX ORDER — MAGNESIUM SULFATE 500 MG/ML
1 VIAL (ML) INJECTION ONCE
Refills: 0 | Status: COMPLETED | OUTPATIENT
Start: 2024-02-19 | End: 2024-02-19

## 2024-02-19 RX ORDER — CALCIUM GLUCONATE 100 MG/ML
1 VIAL (ML) INTRAVENOUS ONCE
Refills: 0 | Status: COMPLETED | OUTPATIENT
Start: 2024-02-19 | End: 2024-02-19

## 2024-02-19 RX ORDER — DILTIAZEM HCL 120 MG
1 CAPSULE, EXT RELEASE 24 HR ORAL
Refills: 0 | DISCHARGE

## 2024-02-19 RX ADMIN — Medication 100 GRAM(S): at 09:22

## 2024-02-19 RX ADMIN — MAGNESIUM OXIDE 400 MG ORAL TABLET 400 MILLIGRAM(S): 241.3 TABLET ORAL at 09:17

## 2024-02-19 RX ADMIN — HEPARIN SODIUM 5000 UNIT(S): 5000 INJECTION INTRAVENOUS; SUBCUTANEOUS at 17:48

## 2024-02-19 RX ADMIN — Medication 100 MILLIGRAM(S): at 11:21

## 2024-02-19 RX ADMIN — Medication 500 MILLIGRAM(S): at 11:21

## 2024-02-19 RX ADMIN — Medication 1 TABLET(S): at 11:21

## 2024-02-19 RX ADMIN — CARVEDILOL PHOSPHATE 6.25 MILLIGRAM(S): 80 CAPSULE, EXTENDED RELEASE ORAL at 05:06

## 2024-02-19 RX ADMIN — CHLORHEXIDINE GLUCONATE 1 APPLICATION(S): 213 SOLUTION TOPICAL at 05:06

## 2024-02-19 RX ADMIN — MAGNESIUM OXIDE 400 MG ORAL TABLET 400 MILLIGRAM(S): 241.3 TABLET ORAL at 11:22

## 2024-02-19 RX ADMIN — HEPARIN SODIUM 5000 UNIT(S): 5000 INJECTION INTRAVENOUS; SUBCUTANEOUS at 05:05

## 2024-02-19 RX ADMIN — Medication 100 GRAM(S): at 12:43

## 2024-02-19 RX ADMIN — CARVEDILOL PHOSPHATE 6.25 MILLIGRAM(S): 80 CAPSULE, EXTENDED RELEASE ORAL at 17:47

## 2024-02-19 RX ADMIN — LISINOPRIL 10 MILLIGRAM(S): 2.5 TABLET ORAL at 05:06

## 2024-02-19 RX ADMIN — MAGNESIUM OXIDE 400 MG ORAL TABLET 400 MILLIGRAM(S): 241.3 TABLET ORAL at 17:48

## 2024-02-19 NOTE — PROGRESS NOTE ADULT - PROBLEM SELECTOR PLAN 1
trauma work up with no acute significant findings  neuroimaging with no acute significant findings  ekg with no new st seg - t wave changes suggestive of acute ischemia - shows sinus tach  trop 77->67, suspect demand and/or decreased clearance  -TTE reassuring, EF 54% and no wall motion abnormalities.   follow up tsh, folate, b12, rpr; orthostatic vitals, tte; ua +/- uc, rvp + cxr; ruq us + hepatitis panel neg  frequent neurochecks  monitor on telemetry  maintain fall + frac, delirium, seizure, aspiration precautions; keep head end of bed elevated  nutrition consult appreciated.   Consider dronabinol for poor po intake. -d/w patient/family, she was not wanting to start it.   pt/ot eval + sw/cm consult for disposition. -Home with home PT.

## 2024-02-19 NOTE — PROGRESS NOTE ADULT - SUBJECTIVE AND OBJECTIVE BOX
Patient is a 73y old  Female who presents with a chief complaint of generalized fatigue/weakness (19 Feb 2024 12:19)        SUBJECTIVE / OVERNIGHT EVENTS: patient says diarrhea resolved. eating better. no n/v. no abd pain.       MEDICATIONS  (STANDING):  ascorbic acid 500 milliGRAM(s) Oral daily  calcium carbonate   1250 mG (OsCal) 1 Tablet(s) Oral daily  carvedilol 6.25 milliGRAM(s) Oral every 12 hours  chlorhexidine 2% Cloths 1 Application(s) Topical <User Schedule>  heparin   Injectable 5000 Unit(s) SubCutaneous every 12 hours  lisinopril 10 milliGRAM(s) Oral daily  magnesium oxide 400 milliGRAM(s) Oral three times a day with meals  multivitamin 1 Tablet(s) Oral daily  sodium chloride 0.9% with potassium chloride 20 mEq/L 1000 milliLiter(s) (100 mL/Hr) IV Continuous <Continuous>  thiamine 100 milliGRAM(s) Oral daily    MEDICATIONS  (PRN):  acetaminophen     Tablet .. 650 milliGRAM(s) Oral every 6 hours PRN Temp greater or equal to 38C (100.4F), Mild Pain (1 - 3)  aluminum hydroxide/magnesium hydroxide/simethicone Suspension 30 milliLiter(s) Oral every 4 hours PRN Dyspepsia  melatonin 3 milliGRAM(s) Oral at bedtime PRN Insomnia  ondansetron Injectable 4 milliGRAM(s) IV Push every 8 hours PRN Nausea and/or Vomiting      Vital Signs Last 24 Hrs  T(C): 36.4 (19 Feb 2024 11:26), Max: 36.8 (18 Feb 2024 21:35)  T(F): 97.6 (19 Feb 2024 11:26), Max: 98.3 (18 Feb 2024 21:35)  HR: 98 (19 Feb 2024 11:26) (86 - 98)  BP: 128/92 (19 Feb 2024 11:26) (117/87 - 135/80)  BP(mean): --  RR: 18 (19 Feb 2024 11:26) (18 - 18)  SpO2: 97% (19 Feb 2024 11:26) (94% - 97%)    Parameters below as of 19 Feb 2024 11:26  Patient On (Oxygen Delivery Method): room air      CAPILLARY BLOOD GLUCOSE        I&O's Summary    18 Feb 2024 07:01  -  19 Feb 2024 07:00  --------------------------------------------------------  IN: 1020 mL / OUT: 1000 mL / NET: 20 mL    19 Feb 2024 07:01  -  19 Feb 2024 15:45  --------------------------------------------------------  IN: 480 mL / OUT: 0 mL / NET: 480 mL          PHYSICAL EXAM:   GENERAL: NAD, thin  HEAD:  Atraumatic, Normocephalic  EYES:  conjunctiva and sclera clear  NECK: Supple,   CHEST/LUNG: Clear to auscultation bilaterally; No wheeze  HEART: S1S2 normal. Regular rate and rhythm; No murmurs, rubs, or gallops  ABDOMEN: Soft, Nontender, Nondistended; Bowel sounds present  EXTREMITIES:    No clubbing, cyanosis, or edema  PSYCH/Neuro: AAOx3. Non-focal.   SKIN: No rashes or lesions      LABS:                        9.0    7.09  )-----------( 300      ( 19 Feb 2024 09:24 )             28.4     02-19    136  |  97  |  17  ----------------------------<  100<H>  4.1   |  30  |  0.42<L>    Ca    8.6      19 Feb 2024 09:44  Phos  2.6     02-19  Mg     1.6     02-19            Urinalysis Basic - ( 19 Feb 2024 09:44 )    Color: x / Appearance: x / SG: x / pH: x  Gluc: 100 mg/dL / Ketone: x  / Bili: x / Urobili: x   Blood: x / Protein: x / Nitrite: x   Leuk Esterase: x / RBC: x / WBC x   Sq Epi: x / Non Sq Epi: x / Bacteria: x          RADIOLOGY & ADDITIONAL TESTS:    Imaging Personally Reviewed:  Consultant(s) Notes Reviewed:  heme/onco  Care Discussed with Consultants/Other Providers:

## 2024-02-19 NOTE — DISCHARGE NOTE NURSING/CASE MANAGEMENT/SOCIAL WORK - NSDCPEFALRISK_GEN_ALL_CORE
For information on Fall & Injury Prevention, visit: [email or web address] OR  [email or web address] OR  https://www.cdc.gov/steadi/patient.html

## 2024-02-19 NOTE — PROGRESS NOTE ADULT - SUBJECTIVE AND OBJECTIVE BOX
Patient is a 73y old  Female who presents with a chief complaint of generalized fatigue/weakness (18 Feb 2024 16:03)    Pt seen and examined at bedside.     MEDICATIONS  (STANDING):  ascorbic acid 500 milliGRAM(s) Oral daily  calcium carbonate   1250 mG (OsCal) 1 Tablet(s) Oral daily  carvedilol 6.25 milliGRAM(s) Oral every 12 hours  chlorhexidine 2% Cloths 1 Application(s) Topical <User Schedule>  heparin   Injectable 5000 Unit(s) SubCutaneous every 12 hours  lisinopril 10 milliGRAM(s) Oral daily  magnesium oxide 400 milliGRAM(s) Oral three times a day with meals  magnesium sulfate  IVPB 1 Gram(s) IV Intermittent once  multivitamin 1 Tablet(s) Oral daily  sodium chloride 0.9% with potassium chloride 20 mEq/L 1000 milliLiter(s) (100 mL/Hr) IV Continuous <Continuous>  thiamine 100 milliGRAM(s) Oral daily    MEDICATIONS  (PRN):  acetaminophen     Tablet .. 650 milliGRAM(s) Oral every 6 hours PRN Temp greater or equal to 38C (100.4F), Mild Pain (1 - 3)  aluminum hydroxide/magnesium hydroxide/simethicone Suspension 30 milliLiter(s) Oral every 4 hours PRN Dyspepsia  melatonin 3 milliGRAM(s) Oral at bedtime PRN Insomnia  ondansetron Injectable 4 milliGRAM(s) IV Push every 8 hours PRN Nausea and/or Vomiting    Vital Signs Last 24 Hrs  T(C): 36.6 (19 Feb 2024 04:41), Max: 36.8 (18 Feb 2024 21:35)  T(F): 97.9 (19 Feb 2024 04:41), Max: 98.3 (18 Feb 2024 21:35)  HR: 86 (19 Feb 2024 04:41) (86 - 93)  BP: 135/80 (19 Feb 2024 04:41) (117/87 - 137/86)  BP(mean): --  RR: 18 (19 Feb 2024 04:41) (18 - 18)  SpO2: 95% (19 Feb 2024 04:41) (94% - 95%)    Parameters below as of 19 Feb 2024 04:41  Patient On (Oxygen Delivery Method): room air        PE  NAD  Awake, alert  Anicteric, MMM  No c/c/e  No rash grossly  FROM                          9.0    7.09  )-----------( 300      ( 19 Feb 2024 09:24 )             28.4       02-19    136  |  97  |  17  ----------------------------<  100<H>  4.1   |  30  |  0.42<L>    Ca    8.6      19 Feb 2024 09:44  Phos  2.6     02-19  Mg     1.6     02-19

## 2024-02-19 NOTE — PROGRESS NOTE ADULT - ASSESSMENT
74yo f w pmh htn, breast ca, p/w generalized fatigue/weakness (x1 week in duration, a/w decreased po intake, n+v+d) leading to syncopal episode.    Breast cancer  --under the care of Easton Eduardo of [facility]  --newly diagnosed, right sided cT2N0 (Triple negative, w/ robust tumor infiltrating lymphocytes)  --currently on neoadjuvant treatment w/  (Cyclophosphamide, Doxorubicin, Pembrolizumab +Onpro) Last given on 02/02/24  --no systemic treatment while inpatient and/or rehab  --ongoing care after discharge    Pancytopenia  --2/2 malignancy and treatment  --transfuse for Hgb <8, platelets <10    syncopal episode  --2/2 decreased PO intake, N/V/D, and anemia  --CT head: No evidence of acute transcortical infarct, acute intracranial hemorrhage or mass effect.  --CT cervical spine: No acute fracture or traumatic subluxation.  --on tele    Electrolyte depletions   --Electrolytes improved  --Given low vit D-25 with elevated PTH, suspect secondary hyperparathyroidism   --2/2 N/V/D    Norovirus  --Diarrhea improved    will follow,    Edward Juan PA-C  Hematology/Oncology  [facility]  [phone] (office)

## 2024-02-19 NOTE — DISCHARGE NOTE NURSING/CASE MANAGEMENT/SOCIAL WORK - PATIENT PORTAL LINK FT
You can access the FollowMyHealth Patient Portal offered by [facility] by registering at the following website: [email or web address]. By joining [facility]’s FollowMyHealth portal, you will also be able to view your health information using other applications (apps) compatible with our system.

## 2024-02-20 VITALS
HEART RATE: 85 BPM | TEMPERATURE: 98 F | OXYGEN SATURATION: 95 % | RESPIRATION RATE: 18 BRPM | SYSTOLIC BLOOD PRESSURE: 122 MMHG | DIASTOLIC BLOOD PRESSURE: 75 MMHG

## 2024-02-20 LAB — CALPROTECTIN STL-MCNT: 214 UG/G — HIGH (ref 0–120)

## 2024-02-20 PROCEDURE — 84295 ASSAY OF SERUM SODIUM: CPT

## 2024-02-20 PROCEDURE — 76376 3D RENDER W/INTRP POSTPROCES: CPT

## 2024-02-20 PROCEDURE — 86704 HEP B CORE ANTIBODY TOTAL: CPT

## 2024-02-20 PROCEDURE — 82607 VITAMIN B-12: CPT

## 2024-02-20 PROCEDURE — P9016: CPT

## 2024-02-20 PROCEDURE — 97161 PT EVAL LOW COMPLEX 20 MIN: CPT

## 2024-02-20 PROCEDURE — 85610 PROTHROMBIN TIME: CPT

## 2024-02-20 PROCEDURE — 85049 AUTOMATED PLATELET COUNT: CPT

## 2024-02-20 PROCEDURE — 80053 COMPREHEN METABOLIC PANEL: CPT

## 2024-02-20 PROCEDURE — 96374 THER/PROPH/DIAG INJ IV PUSH: CPT

## 2024-02-20 PROCEDURE — 83036 HEMOGLOBIN GLYCOSYLATED A1C: CPT

## 2024-02-20 PROCEDURE — 87507 IADNA-DNA/RNA PROBE TQ 12-25: CPT

## 2024-02-20 PROCEDURE — 86706 HEP B SURFACE ANTIBODY: CPT

## 2024-02-20 PROCEDURE — 99232 SBSQ HOSP IP/OBS MODERATE 35: CPT | Mod: GC

## 2024-02-20 PROCEDURE — 87641 MR-STAPH DNA AMP PROBE: CPT

## 2024-02-20 PROCEDURE — 85027 COMPLETE CBC AUTOMATED: CPT

## 2024-02-20 PROCEDURE — 80048 BASIC METABOLIC PNL TOTAL CA: CPT

## 2024-02-20 PROCEDURE — 84540 ASSAY OF URINE/UREA-N: CPT

## 2024-02-20 PROCEDURE — 85045 AUTOMATED RETICULOCYTE COUNT: CPT

## 2024-02-20 PROCEDURE — 86923 COMPATIBILITY TEST ELECTRIC: CPT

## 2024-02-20 PROCEDURE — 71045 X-RAY EXAM CHEST 1 VIEW: CPT

## 2024-02-20 PROCEDURE — 85014 HEMATOCRIT: CPT

## 2024-02-20 PROCEDURE — 81001 URINALYSIS AUTO W/SCOPE: CPT

## 2024-02-20 PROCEDURE — 82272 OCCULT BLD FECES 1-3 TESTS: CPT

## 2024-02-20 PROCEDURE — 76705 ECHO EXAM OF ABDOMEN: CPT

## 2024-02-20 PROCEDURE — 84156 ASSAY OF PROTEIN URINE: CPT

## 2024-02-20 PROCEDURE — 86850 RBC ANTIBODY SCREEN: CPT

## 2024-02-20 PROCEDURE — 83735 ASSAY OF MAGNESIUM: CPT

## 2024-02-20 PROCEDURE — 96375 TX/PRO/DX INJ NEW DRUG ADDON: CPT

## 2024-02-20 PROCEDURE — 83550 IRON BINDING TEST: CPT

## 2024-02-20 PROCEDURE — 84132 ASSAY OF SERUM POTASSIUM: CPT

## 2024-02-20 PROCEDURE — 82803 BLOOD GASES ANY COMBINATION: CPT

## 2024-02-20 PROCEDURE — 82746 ASSAY OF FOLIC ACID SERUM: CPT

## 2024-02-20 PROCEDURE — 84100 ASSAY OF PHOSPHORUS: CPT

## 2024-02-20 PROCEDURE — 85018 HEMOGLOBIN: CPT

## 2024-02-20 PROCEDURE — 83605 ASSAY OF LACTIC ACID: CPT

## 2024-02-20 PROCEDURE — 97166 OT EVAL MOD COMPLEX 45 MIN: CPT

## 2024-02-20 PROCEDURE — 83615 LACTATE (LD) (LDH) ENZYME: CPT

## 2024-02-20 PROCEDURE — 82652 VIT D 1 25-DIHYDROXY: CPT

## 2024-02-20 PROCEDURE — 97116 GAIT TRAINING THERAPY: CPT

## 2024-02-20 PROCEDURE — 82435 ASSAY OF BLOOD CHLORIDE: CPT

## 2024-02-20 PROCEDURE — 84550 ASSAY OF BLOOD/URIC ACID: CPT

## 2024-02-20 PROCEDURE — 36430 TRANSFUSION BLD/BLD COMPNT: CPT

## 2024-02-20 PROCEDURE — 82962 GLUCOSE BLOOD TEST: CPT

## 2024-02-20 PROCEDURE — 83993 ASSAY FOR CALPROTECTIN FECAL: CPT

## 2024-02-20 PROCEDURE — 86900 BLOOD TYPING SEROLOGIC ABO: CPT

## 2024-02-20 PROCEDURE — 93306 TTE W/DOPPLER COMPLETE: CPT

## 2024-02-20 PROCEDURE — 86901 BLOOD TYPING SEROLOGIC RH(D): CPT

## 2024-02-20 PROCEDURE — 86803 HEPATITIS C AB TEST: CPT

## 2024-02-20 PROCEDURE — 87637 SARSCOV2&INF A&B&RSV AMP PRB: CPT

## 2024-02-20 PROCEDURE — 82947 ASSAY GLUCOSE BLOOD QUANT: CPT

## 2024-02-20 PROCEDURE — 82010 KETONE BODYS QUAN: CPT

## 2024-02-20 PROCEDURE — 85025 COMPLETE CBC W/AUTO DIFF WBC: CPT

## 2024-02-20 PROCEDURE — 87077 CULTURE AEROBIC IDENTIFY: CPT

## 2024-02-20 PROCEDURE — 70450 CT HEAD/BRAIN W/O DYE: CPT | Mod: MA

## 2024-02-20 PROCEDURE — 97110 THERAPEUTIC EXERCISES: CPT

## 2024-02-20 PROCEDURE — 36415 COLL VENOUS BLD VENIPUNCTURE: CPT

## 2024-02-20 PROCEDURE — 82550 ASSAY OF CK (CPK): CPT

## 2024-02-20 PROCEDURE — 87045 FECES CULTURE AEROBIC BACT: CPT

## 2024-02-20 PROCEDURE — 83010 ASSAY OF HAPTOGLOBIN QUANT: CPT

## 2024-02-20 PROCEDURE — 83935 ASSAY OF URINE OSMOLALITY: CPT

## 2024-02-20 PROCEDURE — 82570 ASSAY OF URINE CREATININE: CPT

## 2024-02-20 PROCEDURE — 99231 SBSQ HOSP IP/OBS SF/LOW 25: CPT

## 2024-02-20 PROCEDURE — 72125 CT NECK SPINE W/O DYE: CPT | Mod: MA

## 2024-02-20 PROCEDURE — 72170 X-RAY EXAM OF PELVIS: CPT

## 2024-02-20 PROCEDURE — 83970 ASSAY OF PARATHORMONE: CPT

## 2024-02-20 PROCEDURE — 82330 ASSAY OF CALCIUM: CPT

## 2024-02-20 PROCEDURE — 84300 ASSAY OF URINE SODIUM: CPT

## 2024-02-20 PROCEDURE — 93356 MYOCRD STRAIN IMG SPCKL TRCK: CPT

## 2024-02-20 PROCEDURE — 82306 VITAMIN D 25 HYDROXY: CPT

## 2024-02-20 PROCEDURE — 87046 STOOL CULTR AEROBIC BACT EA: CPT

## 2024-02-20 PROCEDURE — 84133 ASSAY OF URINE POTASSIUM: CPT

## 2024-02-20 PROCEDURE — 97535 SELF CARE MNGMENT TRAINING: CPT

## 2024-02-20 PROCEDURE — 99285 EMERGENCY DEPT VISIT HI MDM: CPT | Mod: 25

## 2024-02-20 PROCEDURE — 84484 ASSAY OF TROPONIN QUANT: CPT

## 2024-02-20 PROCEDURE — 97530 THERAPEUTIC ACTIVITIES: CPT

## 2024-02-20 PROCEDURE — 80061 LIPID PANEL: CPT

## 2024-02-20 PROCEDURE — 85730 THROMBOPLASTIN TIME PARTIAL: CPT

## 2024-02-20 PROCEDURE — 84443 ASSAY THYROID STIM HORMONE: CPT

## 2024-02-20 PROCEDURE — 83540 ASSAY OF IRON: CPT

## 2024-02-20 PROCEDURE — 80074 ACUTE HEPATITIS PANEL: CPT

## 2024-02-20 PROCEDURE — 87640 STAPH A DNA AMP PROBE: CPT

## 2024-02-20 PROCEDURE — 82310 ASSAY OF CALCIUM: CPT

## 2024-02-20 PROCEDURE — 87177 OVA AND PARASITES SMEARS: CPT

## 2024-02-20 PROCEDURE — 82728 ASSAY OF FERRITIN: CPT

## 2024-02-20 RX ADMIN — Medication 500 MILLIGRAM(S): at 12:11

## 2024-02-20 RX ADMIN — CHLORHEXIDINE GLUCONATE 1 APPLICATION(S): 213 SOLUTION TOPICAL at 05:12

## 2024-02-20 RX ADMIN — MAGNESIUM OXIDE 400 MG ORAL TABLET 400 MILLIGRAM(S): 241.3 TABLET ORAL at 12:11

## 2024-02-20 RX ADMIN — HEPARIN SODIUM 5000 UNIT(S): 5000 INJECTION INTRAVENOUS; SUBCUTANEOUS at 05:11

## 2024-02-20 RX ADMIN — Medication 1 TABLET(S): at 12:11

## 2024-02-20 RX ADMIN — Medication 100 MILLIGRAM(S): at 12:10

## 2024-02-20 RX ADMIN — LISINOPRIL 10 MILLIGRAM(S): 2.5 TABLET ORAL at 05:11

## 2024-02-20 RX ADMIN — Medication 1 TABLET(S): at 12:10

## 2024-02-20 RX ADMIN — MAGNESIUM OXIDE 400 MG ORAL TABLET 400 MILLIGRAM(S): 241.3 TABLET ORAL at 08:20

## 2024-02-20 RX ADMIN — CARVEDILOL PHOSPHATE 6.25 MILLIGRAM(S): 80 CAPSULE, EXTENDED RELEASE ORAL at 05:10

## 2024-02-20 NOTE — PROGRESS NOTE ADULT - PROBLEM SELECTOR PLAN 3
severe anemia, moderate thrombocytopenia, moderate neutropenia  unclear baseline hgb, plt, white count  no gross bleeding in er  per conversation between er and [facility], suspect 2/2 recent chemo  s/p 1 u prbc in er  follow up post transfusion cbc; anemia work up - s/p another 1 PRBC on admission was given for goal > 8 per d/w heme/onco team.  Monitor hgb, plt w daily CBC; Goal Hb >7g/dl,  Plt >10k, 20k if septic, 50k if actively bleeding  maintain adequate PIV and active type and screen; transfuse blood product as needed to maintain goal
severe anemia, moderate thrombocytopenia, moderate neutropenia  unclear baseline hgb, plt, white count  no gross bleeding in er  per conversation between er and [facility], suspect 2/2 recent chemo  s/p 1 u prbc in er  follow up post transfusion cbc; anemia work up - s/p another 1 PRBC on admission was given for goal > 8 per d/w heme/onco team. -today at goal.   Monitor hgb, plt w daily CBC; Goal Hb >7g/dl,  Plt >10k, 20k if septic, 50k if actively bleeding  maintain adequate PIV and active type and screen; transfuse blood product as needed to maintain goal
severe anemia, moderate thrombocytopenia, moderate neutropenia  unclear baseline hgb, plt, white count  no gross bleeding in er  per conversation between er and [facility], suspect 2/2 recent chemo  s/p 1 u prbc in er  follow up post transfusion cbc; anemia work up - hgb up to 7.1 today but still symptomatic so will give another 1 PRBC for goal > 8 per d/w heme/onco team.   Monitor hgb, plt w daily CBC; Goal Hb >7g/dl,  Plt >10k, 20k if septic, 50k if actively bleeding  maintain adequate PIV and active type and screen; transfuse blood product as needed to maintain goal
severe anemia, moderate thrombocytopenia, moderate neutropenia  unclear baseline hgb, plt, white count  no gross bleeding in er  per conversation between er and [facility], suspect 2/2 recent chemo  s/p 1 u prbc in er  follow up post transfusion cbc; anemia work up - s/p another 1 PRBC on admission was given for goal > 8 per d/w heme/onco team.  Monitor hgb, plt w daily CBC; Goal Hb >7g/dl,  Plt >10k, 20k if septic, 50k if actively bleeding  maintain adequate PIV and active type and screen; transfuse blood product as needed to maintain goal
severe anemia, moderate thrombocytopenia, moderate neutropenia  unclear baseline hgb, plt, white count  no gross bleeding in er  per conversation between er and [facility], suspect 2/2 recent chemo  s/p 1 u prbc in er  follow up post transfusion cbc; anemia work up - s/p another 1 PRBC on admission was given for goal > 8 per d/w heme/onco team. -today at goal.   Monitor hgb, plt w daily CBC; Goal Hb >7g/dl,  Plt >10k, 20k if septic, 50k if actively bleeding  maintain adequate PIV and active type and screen; transfuse blood product as needed to maintain goal

## 2024-02-20 NOTE — PROGRESS NOTE ADULT - SUBJECTIVE AND OBJECTIVE BOX
Cydney Navas MD  Division of Hospital Medicine  Reach[employer] on MS Teams    PROGRESS NOTE:     Patient is a 73y old  Female who presents with a chief complaint of generalized fatigue/weakness (20 Feb 2024 10:48)      SUBJECTIVE / OVERNIGHT EVENTS: No acute events overnight, seen this AM, sitting in chair, ready to go home. Family at bedside     ADDITIONAL REVIEW OF SYSTEMS:    MEDICATIONS  (STANDING):  ascorbic acid 500 milliGRAM(s) Oral daily  calcium carbonate   1250 mG (OsCal) 1 Tablet(s) Oral daily  carvedilol 6.25 milliGRAM(s) Oral every 12 hours  chlorhexidine 2% Cloths 1 Application(s) Topical <User Schedule>  heparin   Injectable 5000 Unit(s) SubCutaneous every 12 hours  lisinopril 10 milliGRAM(s) Oral daily  magnesium oxide 400 milliGRAM(s) Oral three times a day with meals  multivitamin 1 Tablet(s) Oral daily  sodium chloride 0.9% with potassium chloride 20 mEq/L 1000 milliLiter(s) (100 mL/Hr) IV Continuous <Continuous>  thiamine 100 milliGRAM(s) Oral daily    MEDICATIONS  (PRN):  acetaminophen     Tablet .. 650 milliGRAM(s) Oral every 6 hours PRN Temp greater or equal to 38C (100.4F), Mild Pain (1 - 3)  aluminum hydroxide/magnesium hydroxide/simethicone Suspension 30 milliLiter(s) Oral every 4 hours PRN Dyspepsia  melatonin 3 milliGRAM(s) Oral at bedtime PRN Insomnia  ondansetron Injectable 4 milliGRAM(s) IV Push every 8 hours PRN Nausea and/or Vomiting      CAPILLARY BLOOD GLUCOSE        I&O's Summary    19 Feb 2024 07:01  -  20 Feb 2024 07:00  --------------------------------------------------------  IN: 530 mL / OUT: 0 mL / NET: 530 mL        PHYSICAL EXAM:  Vital Signs Last 24 Hrs  T(C): 36.7 (20 Feb 2024 12:03), Max: 37 (20 Feb 2024 04:40)  T(F): 98 (20 Feb 2024 12:03), Max: 98.6 (20 Feb 2024 04:40)  HR: 85 (20 Feb 2024 12:03) (85 - 98)  BP: 122/75 (20 Feb 2024 12:03) (111/71 - 128/91)  BP(mean): --  RR: 18 (20 Feb 2024 12:03) (18 - 18)  SpO2: 95% (20 Feb 2024 12:03) (94% - 97%)    Parameters below as of 20 Feb 2024 12:03  Patient On (Oxygen Delivery Method): room air        CONSTITUTIONAL: NAD, thin  RESPIRATORY: Normal respiratory effort; lungs are clear to auscultation bilaterally  CARDIOVASCULAR: Regular rate and rhythm, normal S1 and S2, no murmur/rub/gallop; No lower extremity edema  ABDOMEN: Nontender to palpation, normoactive bowel sounds, no rebound/guarding; No hepatosplenomegaly  MUSCLOSKELETAL: no clubbing or cyanosis of digits; no joint swelling or tenderness to palpation  PSYCH: A+O to person, place, and time; affect appropriate    LABS:                        9.0    7.09  )-----------( 300      ( 19 Feb 2024 09:24 )             28.4     02-19    136  |  97  |  17  ----------------------------<  100<H>  4.1   |  30  |  0.42<L>    Ca    8.6      19 Feb 2024 09:44  Phos  2.6     02-19  Mg     1.6     02-19            Urinalysis Basic - ( 19 Feb 2024 09:44 )    Color: x / Appearance: x / SG: x / pH: x  Gluc: 100 mg/dL / Ketone: x  / Bili: x / Urobili: x   Blood: x / Protein: x / Nitrite: x   Leuk Esterase: x / RBC: x / WBC x   Sq Epi: x / Non Sq Epi: x / Bacteria: x          RADIOLOGY & ADDITIONAL TESTS:  Results Reviewed:   Imaging Personally Reviewed:  Electrocardiogram Personally Reviewed:    COORDINATION OF CARE:  Care Discussed with Consultants/Other Providers [Y/N]:  Prior or Outpatient Records Reviewed [Y/N]:

## 2024-02-20 NOTE — PROGRESS NOTE ADULT - ASSESSMENT
72yo f w pmh htn, breast ca, p/w generalized fatigue/weakness (x1 week in duration, a/w decreased po intake, n+v+d) leading to syncopal episode.    Breast cancer  --under [place] [facility]ardo [place] [facility]  --newly diagnosed, right sided cT2N0 (Triple negative, w/ robust tumor infiltrating lymphocytes)  --currently on neoadjuvant treatment w/  (Cyclophosphamide, Doxorubicin, Pembrolizumab +Onpro) Last given on 02/02/24  --no systemic treatment while inpatient and/or rehab  --ongoing care after discharge    Pancytopenia  --2/2 malignancy and treatment  --transfuse for Hgb <8, platelets <10    syncopal episode  --2/2 decreased PO intake, N/V/D, and anemia  --CT head: No evidence of acute transcortical infarct, acute intracranial hemorrhage or mass effect.  --CT cervical spine: No acute fracture or traumatic subluxation.  --on tele    Electrolyte depletions   --Electrolytes improved. Per nephro, plan to continue PO Mag and Calcium carbonate on discharge.  --Given low vit D-25 with elevated PTH, suspect secondary hyperparathyroidism   --2/2 N/V/D    Norovirus  --Diarrhea improved    will follow,    Edward Juan PA-C  Hematology/Oncology  [facility]  [phone] (office) 74yo f w pmh htn, breast ca, p/w generalized fatigue/weakness (x1 week in duration, a/w decreased po intake, n+v+d) leading to syncopal episode.    Breast cancer  --under the care of Easton Eduardo of [facility] [place]  --newly diagnosed, right sided cT2N0 (Triple negative, w/ robust tumor infiltrating lymphocytes)  --currently on neoadjuvant treatment w/  (Cyclophosphamide, Doxorubicin, Pembrolizumab +Onpro) Last given on 02/02/24  --no systemic treatment while inpatient and/or rehab  --ongoing care after discharge    Pancytopenia  --2/2 malignancy and treatment  --transfuse for Hgb <8, platelets <10    syncopal episode  --2/2 decreased PO intake, N/V/D, and anemia  --CT head: No evidence of acute transcortical infarct, acute intracranial hemorrhage or mass effect.  --CT cervical spine: No acute fracture or traumatic subluxation.  --on tele    Electrolyte depletions   --Electrolytes improved. Per nephro, plan to continue PO Mag and Calcium carbonate on discharge.  --Given low vit D-25 with elevated PTH, suspect secondary hyperparathyroidism   --2/2 N/V/D    Norovirus  --Diarrhea improved    Discharge today, plan to f/u at [facility].    Edward Juan PA-C  Hematology/Oncology  [facility]  [phone] (office)

## 2024-02-20 NOTE — PROGRESS NOTE ADULT - PROBLEM SELECTOR PROBLEM 1
Electrolyte depletion
Electrolyte depletion
Syncope
Syncope
Electrolyte depletion
Syncope

## 2024-02-20 NOTE — PROGRESS NOTE ADULT - NS ATTEND AMEND GEN_ALL_CORE FT
Agree with above assessment and plan.   Continue supportive care. Norovirus + discussed with patient and spouse.
Continue current management per above note
Continue current management per above note  for dc planning today
electrolyte distrubance likely from poor po intake. cont repletion
Agree with above  Multiple electrolyte abnormalities, malnutrition  Nephrology on board  No systemic cancer treatment while admitted
Agree with above assessment and plan. TNBC on KEYNOTE 522- electrolyte depletion, diarrhea with Norovirus. Continue supportive care. Follow up at [facility] after discharge.
as above

## 2024-02-20 NOTE — PROGRESS NOTE ADULT - ATTENDING COMMENTS
74yo f w pmh htn, breast ca, p/w generalized fatigue/weakness (x1 week in duration, a/w decreased po intake, n+v+d) leading to fall; unwitnessed, +head strike, not on blood thinners, unclear whether a/w loc; patient's family member found patient on floor early this morning; contacted outpatient provider who recommended patient go to er for further evaluation. reportedly, patient recently underwent chemo for her breast ca. in er, found to have multiple metabolic disturbances suggestive of hypovolemia; admit to medicine for further mgmt. Seen at bedside.  Alert, conversant,  taking PO.    Acknowledged course including diarrhea associated with recent chemo rx although improved.   Overall greatly improved  1.  Hypomagnesemia--may be associated with low K and Ca.  PO supplement  2.  Hypokalemia--improved  3.  Hypocalcemia--may relate to 1 related effects on PTH.  Check vitamin D OK.  Trend ionized level with goal >1  4.  Volume depletion--poor PO intake (reflected in initial starvation ketosis.  Volume optimized with IV NaCl--., PO Nutrition.  No evidence of overload.  5.  Hypophosphatemia--replete with refeeding worsened although no improved with diet.  .  1,25Vit D elevated likely related to low P.  Improved nutrition key    discussed with med team  Newton Christianson MD  contact me on TEAMS
72yo f w pmh htn, breast ca, p/w generalized fatigue/weakness (x1 week in duration, a/w decreased po intake, n+v+d) leading to fall; unwitnessed, +head strike, not on blood thinners, unclear whether a/w loc; patient's family member found patient on floor early this morning; contacted outpatient provider who recommended patient go to er for further evaluation. reportedly, patient recently underwent chemo for her breast ca. in er, found to have multiple metabolic disturbances suggestive of hypovolemia; admit to medicine for further mgmt. Seen at bedside.  Alert, conversant, IV infusing, taking PO.    Acknowledged course including diarrhea associated with recent chemo rx although improved.     1.  Hypomagnesemia--may be associated with low K and Ca.  aggressive rx to fix 2,3  2.  Hypokalemia--telemetry.  PO>IV repletion  3.  Hypocalcemia--may relate to 1 related effects on PTH.  Check PTH, vitamin D  4.  Volume depletion--poor PO intake (reflected in initial starvation ketosis.  Volume optimize IV NaCl, PO.  No evidence of overload.    5.  Hypophosphatemia--replete and can get lower with refeeding.  Check Vit D and if low supplement.  Improved nutrition    discussed with med team  Newton Christianson MD  contact me on TEAMS
74yo f w pmh htn, breast ca, p/w generalized fatigue/weakness (x1 week in duration, a/w decreased po intake, n+v+d) leading to fall; unwitnessed, +head strike, not on blood thinners, unclear whether a/w loc; patient's family member found patient on floor early this morning; contacted outpatient provider who recommended patient go to er for further evaluation. reportedly, patient recently underwent chemo for her breast ca. in er, found to have multiple metabolic disturbances suggestive of hypovolemia; admit to medicine for further mgmt. Seen at bedside.  Alert, conversant,  taking PO.    Acknowledged course including diarrhea associated with recent chemo rx although improved.     1.  Hypomagnesemia--may be associated with low K and Ca.  aggressive rx to fix 2,3.CHECK FULL ULYTES INCLUDING Mg.  While GI losses may be major factor would exclude   2.  Hypokalemia--improved  3.  Hypocalcemia--may relate to 1 related effects on PTH.  Check PTH, vitamin D.  Trend ionized level  4.  Volume depletion--poor PO intake (reflected in initial starvation ketosis.  Volume optimized with IV NaCl, PO.  No evidence of overload.    5.  Hypophosphatemia--replete and can get lower with refeeding.  1,25Vit D elevated likely related to low P.  Improved nutrition key    discussed with med team  Newton Christianson MD  contact me on TEAMS

## 2024-02-20 NOTE — PROGRESS NOTE ADULT - PROVIDER SPECIALTY LIST ADULT
Heme/Onc
Hospitalist
Hospitalist
Nephrology
Nephrology
Heme/Onc
Heme/Onc
Hospitalist
Hospitalist
Heme/Onc
Heme/Onc
Hospitalist
Nephrology
Hospitalist

## 2024-02-20 NOTE — PROGRESS NOTE ADULT - SUBJECTIVE AND OBJECTIVE BOX
Patient is a 73y old  Female who presents with a chief complaint of generalized fatigue/weakness (20 Feb 2024 07:53)    Pt seen and examined at bedside. Feeling better.    MEDICATIONS  (STANDING):  ascorbic acid 500 milliGRAM(s) Oral daily  calcium carbonate   1250 mG (OsCal) 1 Tablet(s) Oral daily  carvedilol 6.25 milliGRAM(s) Oral every 12 hours  chlorhexidine 2% Cloths 1 Application(s) Topical <User Schedule>  heparin   Injectable 5000 Unit(s) SubCutaneous every 12 hours  lisinopril 10 milliGRAM(s) Oral daily  magnesium oxide 400 milliGRAM(s) Oral three times a day with meals  multivitamin 1 Tablet(s) Oral daily  sodium chloride 0.9% with potassium chloride 20 mEq/L 1000 milliLiter(s) (100 mL/Hr) IV Continuous <Continuous>  thiamine 100 milliGRAM(s) Oral daily    MEDICATIONS  (PRN):  acetaminophen     Tablet .. 650 milliGRAM(s) Oral every 6 hours PRN Temp greater or equal to 38C (100.4F), Mild Pain (1 - 3)  aluminum hydroxide/magnesium hydroxide/simethicone Suspension 30 milliLiter(s) Oral every 4 hours PRN Dyspepsia  melatonin 3 milliGRAM(s) Oral at bedtime PRN Insomnia  ondansetron Injectable 4 milliGRAM(s) IV Push every 8 hours PRN Nausea and/or Vomiting      ROS  No fever, sweats, chills  No epistaxis, HA, sore throat  No CP, SOB, cough, sputum  No n/v/d, abd pain, melena, hematochezia  No edema  No rash  No anxiety  No back pain, joint pain  No bleeding, bruising  No dysuria, hematuria    Vital Signs Last 24 Hrs  T(C): 36.6 (20 Feb 2024 07:38), Max: 37 (20 Feb 2024 04:40)  T(F): 97.9 (20 Feb 2024 07:38), Max: 98.6 (20 Feb 2024 04:40)  HR: 86 (20 Feb 2024 07:38) (86 - 98)  BP: 124/81 (20 Feb 2024 07:38) (111/71 - 128/92)  BP(mean): --  RR: 18 (20 Feb 2024 07:38) (18 - 18)  SpO2: 96% (20 Feb 2024 07:38) (94% - 97%)    Parameters below as of 20 Feb 2024 07:38  Patient On (Oxygen Delivery Method): room air        PE  NAD  Awake, alert  Anicteric, MMM  RRR  CTAB  Abd soft, NT, ND  No c/c/e  No rash grossly  FROM                          9.0    7.09  )-----------( 300      ( 19 Feb 2024 09:24 )             28.4       02-19    136  |  97  |  17  ----------------------------<  100<H>  4.1   |  30  |  0.42<L>    Ca    8.6      19 Feb 2024 09:44  Phos  2.6     02-19  Mg     1.6     02-19

## 2024-02-20 NOTE — PROGRESS NOTE ADULT - PROBLEM SELECTOR PROBLEM 2
Disorders of fluid, electrolyte, and acid-base balance

## 2024-02-20 NOTE — PROGRESS NOTE ADULT - PROBLEM SELECTOR PLAN 5
holding home antihtn in lieu of borderline low bp and in lieu of lyte disturbances. -will c/w carvedilol for now since bp coming up. -Plan to hold home HCTZ indefinitely in setting of lyte disturbances. -Will resume home ramipril equivalent and that may also help with low K.     6. Discussed plan with patient and family member at bedside.   -F/u PT recs.
holding home antihtn in lieu of borderline low bp and in lieu of lyte disturbances. -will resume carvedilol for now since bp coming up. -Plan to hold home HCTZ indefinitely in setting of lyte disturbances.     6. Discussed plan with patient and family member at bedside and ACP Saige.   -F/u PT recs.
holding home antihtn in lieu of borderline low bp and in lieu of lyte disturbances. -will c/w carvedilol for now since bp coming up. -Plan to hold home HCTZ indefinitely in setting of lyte disturbances. -Will c/w home ramipril equivalent and that may also help with low K.     6. Discussed plan with patient and ACP Nunu.   -F/u PT recs. -home PT.   -OOB to chair.  -Heparin SQ for DVT PPx.  Dispo: -Patient's diarrhea resolved. Her Hgb stable/improved. Her electrolytes within normal now. She is medically clear for DC with close outpatient PMD and heme/onco f/u. dc home today with homecare
holding home antihtn in lieu of borderline low bp and in lieu of lyte disturbances. -will c/w carvedilol for now since bp coming up. -Plan to hold home HCTZ indefinitely in setting of lyte disturbances. -Will c/w home ramipril equivalent and that may also help with low K.     6. Discussed plan with patient and ACP Meri and CORNELIO.  -F/u PT recs.  -OOB to chair.  -Heparin SQ for DVT PPx.  -DCP soon.
holding home antihtn in lieu of borderline low bp and in lieu of lyte disturbances. -will c/w carvedilol for now since bp coming up. -Plan to hold home HCTZ indefinitely in setting of lyte disturbances. -Will c/w home ramipril equivalent and that may also help with low K.     6. Discussed plan with patient and her sister at bedside and ACP Nunu and CM and RN.  -F/u PT recs.  -OOB to chair.  -Heparin SQ for DVT PPx.
hold home antihtn in lieu of borderline low bp and in lieu of lyte disturbances    6. Discussed plan with patient and heme/onco and ACP Arminda.   -F/u PT recs.
holding home antihtn in lieu of borderline low bp and in lieu of lyte disturbances. -will c/w carvedilol for now since bp coming up. -Plan to hold home HCTZ indefinitely in setting of lyte disturbances. -Will c/w home ramipril equivalent and that may also help with low K.     6. Discussed plan with patient and ACP Nunu.   -F/u PT recs. -home PT.   -OOB to chair.  -Heparin SQ for DVT PPx.  Dispo: -Patient's diarrhea resolved. Her Hgb stable/improved. Her electrolytes within normal now. She is medically clear for DC with close outpatient PMD and heme/onco f/u. -F/u with CM for DCP. -36 minutes spent on the DC process.
holding home antihtn in lieu of borderline low bp and in lieu of lyte disturbances. -will c/w carvedilol for now since bp coming up. -Plan to hold home HCTZ indefinitely in setting of lyte disturbances.     6. Discussed plan with patient and family member at bedside and ACP Saige.   -F/u PT recs.

## 2024-02-20 NOTE — PROGRESS NOTE ADULT - PROBLEM SELECTOR PLAN 1
Presented with hypomag, hypoK, hypophosphatemia as well as hypocalcemia. This is a patient on active chemotherapy starting in Oct 2023 with her last chemotherapy session on 2/2/2024. She was also +Norovirus with diarrhea. Her initial presentation was for syncope with initial labs noted with what appears to be prerenal azotemia (responded to fluids as well as holding her home hctz. Symptoms have resolved.  - Overall, electrolytes improved  - Continue to monitor bmp, mag, phos. Replenish as appropriate preferably orally for potassium specifically given she is able to now tolerate PO intake.  - In regards to her hypocalcemia, Vitamin D-25 appears to be on the low end of normal with elevated PTH. Suspect she has secondary hyperparathyroidism due to her hypocalcemia but her hypocalcemia is caused by PTH resistance from hypomagnesemia. Her Vitamin D-1,25 elevation may be secondary to her secondary hyperparathyroidism. Ionized Ca now at goal>1.0    Nephrology is signing off. Continue PO Mag and Calcium carbonate supplementation on discharge. Do NOT continue HCTZ. Continue Lisinopril for BP and to maintain adequate K level. BP is stable. She should follow up with PMD for repeat labs and to monitor electrolyte levels.  Please reconsult or call with any additional questions/concerns.    Mauro Grimm,   Nephrology Fellow  Feel free to contact me directly on TEAMS with any questions.  (After 5pm or on weekends, please call the on-call fellow).

## 2024-02-20 NOTE — PROGRESS NOTE ADULT - PROBLEM SELECTOR PLAN 4
obtain records from [facility] - not needed since [facility] following/in touch with [facility]  medonc consult recs appreciated.
obtain records from [facility] consult recs appreciated.
obtain records from [facility] - not needed since [facility] following/in touch with [facility]  medonc consult recs appreciated.
obtain records from [facility] - not needed since [facility] following/in touch with [facility]  medonc consult recs appreciated.

## 2024-02-20 NOTE — PROGRESS NOTE ADULT - REASON FOR ADMISSION
generalized fatigue/weakness

## 2024-02-20 NOTE — PROGRESS NOTE ADULT - SUBJECTIVE AND OBJECTIVE BOX
[facility] DIVISION OF KIDNEY DISEASES AND HYPERTENSION   FOLLOW UP NOTE    --------------------------------------------------------------------------------    SUBJECTIVE / ROS / INTERVAL EVENTS:  - Patient seen and examined at bedside  - Feels well, diarrhea resolved. Renal fx stable. Electrolytes have improved        PAST HISTORY  --------------------------------------------------------------------------------  No significant changes to PMH, PSH, FHx, SHx, unless otherwise noted    ALLERGIES & MEDICATIONS  --------------------------------------------------------------------------------  Allergies    No Known Allergies      Standing Inpatient Medications  ascorbic acid 500 milliGRAM(s) Oral daily  calcium carbonate   1250 mG (OsCal) 1 Tablet(s) Oral daily  carvedilol 6.25 milliGRAM(s) Oral every 12 hours  chlorhexidine 2% Cloths 1 Application(s) Topical <User Schedule>  heparin   Injectable 5000 Unit(s) SubCutaneous every 12 hours  lisinopril 10 milliGRAM(s) Oral daily  magnesium oxide 400 milliGRAM(s) Oral three times a day with meals  multivitamin 1 Tablet(s) Oral daily  sodium chloride 0.9% with potassium chloride 20 mEq/L 1000 milliLiter(s) IV Continuous <Continuous>  thiamine 100 milliGRAM(s) Oral daily    PRN Inpatient Medications  acetaminophen     Tablet .. 650 milliGRAM(s) Oral every 6 hours PRN  aluminum hydroxide/magnesium hydroxide/simethicone Suspension 30 milliLiter(s) Oral every 4 hours PRN  melatonin 3 milliGRAM(s) Oral at bedtime PRN  ondansetron Injectable 4 milliGRAM(s) IV Push every 8 hours PRN      VITALS  --------------------------------------------------------------------------------  T(C): 36.6 (02-20-24 @ 07:38), Max: 37 (02-20-24 @ 04:40)  HR: 86 (02-20-24 @ 07:38) (86 - 98)  BP: 124/81 (02-20-24 @ 07:38) (111/71 - 128/92)  RR: 18 (02-20-24 @ 07:38) (18 - 18)  SpO2: 96% (02-20-24 @ 07:38) (94% - 97%)  Wt(kg): --      02-19-24 @ 07:01  -  02-20-24 @ 07:00  --------------------------------------------------------  IN: 530 mL / OUT: 0 mL / NET: 530 mL      PHYSICAL EXAM:  General: no acute distress  Neuro: no focal deficits  HEENT: NC/AT, anicteric, no JVD  Pulmonary: lungs CTA B/L  Cardiovascular/Chest: +S1S2, RRR  GI/Abdomen: soft, NT/ND, +bowel sounds  Extremities: No edema  : purewick  Skin: Warm and dry    LABS/STUDIES  --------------------------------------------------------------------------------              9.0    7.09  >-----------<  300      [02-19-24 @ 09:24]              28.4     136  |  97  |  17  ----------------------------<  100      [02-19-24 @ 09:44]  4.1   |  30  |  0.42        Ca     8.6     [02-19-24 @ 09:44]      iCa    1.08     [02-19 @ 10:02]      Mg     1.6     [02-19-24 @ 09:44]      Phos  2.6     [02-19-24 @ 09:44]        Creatinine Trend:  SCr 0.42 [02-19 @ 09:44]  SCr 0.42 [02-18 @ 05:09]  SCr 0.38 [02-17 @ 20:45]  SCr 0.45 [02-17 @ 08:28]  SCr 0.41 [02-16 @ 07:25]    Urinalysis - [02-19-24 @ 09:44]      Color  / Appearance  / SG  / pH       Gluc 100 / Ketone   / Bili  / Urobili        Blood  / Protein  / Leuk Est  / Nitrite       RBC  / WBC  / Hyaline  / Gran  / Sq Epi  / Non Sq Epi  / Bacteria     Urine Creatinine 54      [02-13-24 @ 21:25]  Urine Protein 58      [02-13-24 @ 21:25]  Urine Sodium 91      [02-13-24 @ 21:25]  Urine Urea Nitrogen 696      [02-13-24 @ 21:25]  Urine Potassium 35      [02-13-24 @ 21:25]  Urine Osmolality 552      [02-13-24 @ 21:25]    HBsAb Nonreact      [02-14-24 @ 06:42]  HBsAg Nonreact      [02-13-24 @ 06:35]  HBcAb Nonreact      [02-14-24 @ 06:42]  HCV 0.04, Nonreact      [02-14-24 @ 06:42]

## 2024-02-20 NOTE — PROGRESS NOTE ADULT - NUTRITIONAL ASSESSMENT
This patient has been assessed with a concern for Malnutrition and has been determined to have a diagnosis/diagnoses of Severe protein-calorie malnutrition and Underweight (BMI < 19).    This patient is being managed with:   Diet Regular-  Liquid Protein Supplement     Qty per Day:  1  Supplement Feeding Modality:  Oral  Ensure Plus High Protein Cans or Servings Per Day:  1       Frequency:  Daily  Entered: Feb 14 2024  6:47PM  

## 2024-02-20 NOTE — PROGRESS NOTE ADULT - PROBLEM SELECTOR PLAN 2
hypoNa, hypoCl, hypoK, hypoMg + hypoP, HAGMA, hypoCa (despite correction w albumin), marissa w bun/cr > 20:1; consistent with dehydration + hypovolemia  s/p 1 L NS + ca glu, mgso4, mgo, kcl  follow up urine studies; abg/vbg; lactic acid + bhb; gi pcr + fecal calprotectin for reported diarrhea.   -diarrhea returned, f/u stool studies - Norovirus positive, supportive care.   Monitor I/Os, daily weights, UO, BUN/Cr, volume status, acid-base balance, electrolytes (na, k, ca, mg, p)  hold home hctz  avoid nephrotoxic agents; appropriate dose adjustments for all renally cleared medications   encourage po intake; IVF resusci + electrolyte supplementation. -aggressive repletion and monitoring lytes BID.   -Renal appreciated; f/u recs. -vitamin D wnl, PTH elevated. -Ionized Ca. -Urine Cr/Mg. -Will c/w Mag 400mg TID po for now.   -Plan to hold home HCTZ indefinitely in setting of lyte disturbances.
hypoNa, hypoCl, hypoK, hypoMg + hypoP, HAGMA, hypoCa (despite correction w albumin), marissa w bun/cr > 20:1; consistent with dehydration + hypovolemia  s/p 1 L NS + ca glu, mgso4, mgo, kcl  follow up urine studies; abg/vbg; lactic acid + bhb; gi pcr + fecal calprotectin for reported diarrhea.   -diarrhea returned, f/u stool studies - Norovirus positive, supportive care.   Monitor I/Os, daily weights, UO, BUN/Cr, volume status, acid-base balance, electrolytes (na, k, ca, mg, p)  hold home hctz  avoid nephrotoxic agents; appropriate dose adjustments for all renally cleared medications   encourage po intake; IVF resusci + electrolyte supplementation. -aggressive repletion and monitoring lytes BID.   -Renal appreciated; f/u recs. -vitamin D wnl, PTH elevated. -Ionized Ca. -Urine Cr/Mg. -Will c/w Mag 400mg TID po for now.   -Plan to hold home HCTZ indefinitely in setting of lyte disturbances.
hypoNa, hypoCl, hypoK, hypoMg + hypoP, HAGMA, hypoCa (despite correction w albumin), marissa w bun/cr > 20:1; consistent with dehydration + hypovolemia  s/p 1 L NS + ca glu, mgso4, mgo, kcl  follow up urine studies; abg/vbg; lactic acid + bhb; gi pcr + fecal calprotectin for reported diarrhea.   Monitor I/Os, daily weights, UO, BUN/Cr, volume status, acid-base balance, electrolytes (na, k, ca, mg, p)  hold home hctz  avoid nephrotoxic agents; appropriate dose adjustments for all renally cleared medications   encourage po intake; IVF resusci + electrolyte supplementation as needed in the mean time
hypoNa, hypoCl, hypoK, hypoMg + hypoP, HAGMA, hypoCa (despite correction w albumin), marissa w bun/cr > 20:1; consistent with dehydration + hypovolemia  s/p 1 L NS + ca glu, mgso4, mgo, kcl  follow up urine studies; abg/vbg; lactic acid + bhb; gi pcr + fecal calprotectin for reported diarrhea.   -diarrhea returned, f/u stool studies - Norovirus positive, supportive care.   hold home hctz  avoid nephrotoxic agents; appropriate dose adjustments for all renally cleared medications   -Renal appreciated; f/u recs. -vitamin D wnl, PTH elevated. -Ionized Ca. -Urine Cr/Mg. -Will c/w Mag 400mg TID po for now.   -Plan to hold home HCTZ indefinitely in setting of lyte disturbances.
hypoNa, hypoCl, hypoK, hypoMg + hypoP, HAGMA, hypoCa (despite correction w albumin), marissa w bun/cr > 20:1; consistent with dehydration + hypovolemia  s/p 1 L NS + ca glu, mgso4, mgo, kcl  follow up urine studies; abg/vbg; lactic acid + bhb; gi pcr + fecal calprotectin for reported diarrhea.   -diarrhea returned, f/u stool studies.   Monitor I/Os, daily weights, UO, BUN/Cr, volume status, acid-base balance, electrolytes (na, k, ca, mg, p)  hold home hctz  avoid nephrotoxic agents; appropriate dose adjustments for all renally cleared medications   encourage po intake; IVF resusci + electrolyte supplementation. -aggressive repletion and monitoring lytes BID.   -Renal appreciated; f/u recs. -vitamin D wnl, PTH elevated. -Ionized Ca.   -Plan to hold home HCTZ indefinitely in setting of lyte disturbances.
hypoNa, hypoCl, hypoK, hypoMg + hypoP, HAGMA, hypoCa (despite correction w albumin), marissa w bun/cr > 20:1; consistent with dehydration + hypovolemia  s/p 1 L NS + ca glu, mgso4, mgo, kcl  follow up urine studies; abg/vbg; lactic acid + bhb; gi pcr + fecal calprotectin for reported diarrhea.   -diarrhea returned, f/u stool studies - Norovirus positive, supportive care.   Monitor I/Os, daily weights, UO, BUN/Cr, volume status, acid-base balance, electrolytes (na, k, ca, mg, p)  hold home hctz  avoid nephrotoxic agents; appropriate dose adjustments for all renally cleared medications   encourage po intake; IVF resusci + electrolyte supplementation. -aggressive repletion and monitoring lytes BID.   -Renal appreciated; f/u recs. -vitamin D wnl, PTH elevated. -Ionized Ca. -Urine Cr/Mg. -Will c/w Mag 400mg TID po for now.   -Plan to hold home HCTZ indefinitely in setting of lyte disturbances.
hypoNa, hypoCl, hypoK, hypoMg + hypoP, HAGMA, hypoCa (despite correction w albumin), marissa w bun/cr > 20:1; consistent with dehydration + hypovolemia  s/p 1 L NS + ca glu, mgso4, mgo, kcl  follow up urine studies; abg/vbg; lactic acid + bhb; gi pcr + fecal calprotectin for reported diarrhea.   -diarrhea returned, f/u stool studies - Norovirus positive, supportive care.   Monitor I/Os, daily weights, UO, BUN/Cr, volume status, acid-base balance, electrolytes (na, k, ca, mg, p)  hold home hctz  avoid nephrotoxic agents; appropriate dose adjustments for all renally cleared medications   encourage po intake; IVF resusci + electrolyte supplementation. -aggressive repletion and monitoring lytes BID.   -Renal appreciated; f/u recs. -vitamin D wnl, PTH elevated. -Ionized Ca. -Urine Cr/Mg. -Will do Mag 400mg TID po.   -Plan to hold home HCTZ indefinitely in setting of lyte disturbances.
hypoNa, hypoCl, hypoK, hypoMg + hypoP, HAGMA, hypoCa (despite correction w albumin), marissa w bun/cr > 20:1; consistent with dehydration + hypovolemia  s/p 1 L NS + ca glu, mgso4, mgo, kcl  follow up urine studies; abg/vbg; lactic acid + bhb; gi pcr + fecal calprotectin for reported diarrhea. -diarrhea resolved per patient.   Monitor I/Os, daily weights, UO, BUN/Cr, volume status, acid-base balance, electrolytes (na, k, ca, mg, p)  hold home hctz  avoid nephrotoxic agents; appropriate dose adjustments for all renally cleared medications   encourage po intake; IVF resusci + electrolyte supplementation. -aggressive repletion and monitoring lytes BID.   -Renal appreciated; f/u recs. -vitamin D, PTH.  -Plan to hold home HCTZ indefinitely in setting of lyte disturbances.

## 2024-03-18 ENCOUNTER — INPATIENT (INPATIENT)
Facility: HOSPITAL | Age: 74
LOS: 6 days | Discharge: HOME | End: 2024-03-25
Attending: INTERNAL MEDICINE | Admitting: INTERNAL MEDICINE
Payer: MEDICARE

## 2024-03-18 VITALS
TEMPERATURE: 98 F | DIASTOLIC BLOOD PRESSURE: 86 MMHG | OXYGEN SATURATION: 98 % | HEART RATE: 85 BPM | RESPIRATION RATE: 18 BRPM | SYSTOLIC BLOOD PRESSURE: 120 MMHG

## 2024-03-18 DIAGNOSIS — D64.9 ANEMIA, UNSPECIFIED: ICD-10-CM

## 2024-03-18 LAB
ALBUMIN SERPL ELPH-MCNC: 2.9 G/DL — LOW (ref 3.3–5)
ALP SERPL-CCNC: 60 U/L — SIGNIFICANT CHANGE UP (ref 40–120)
ALT FLD-CCNC: 16 U/L — SIGNIFICANT CHANGE UP (ref 4–33)
ANION GAP SERPL CALC-SCNC: 14 MMOL/L — SIGNIFICANT CHANGE UP (ref 7–14)
APPEARANCE UR: ABNORMAL
AST SERPL-CCNC: 27 U/L — SIGNIFICANT CHANGE UP (ref 4–32)
BACTERIA # UR AUTO: NEGATIVE /HPF — SIGNIFICANT CHANGE UP
BASOPHILS # BLD AUTO: 0.01 K/UL — SIGNIFICANT CHANGE UP (ref 0–0.2)
BASOPHILS NFR BLD AUTO: 0.6 % — SIGNIFICANT CHANGE UP (ref 0–2)
BILIRUB SERPL-MCNC: 0.9 MG/DL — SIGNIFICANT CHANGE UP (ref 0.2–1.2)
BILIRUB UR-MCNC: NEGATIVE — SIGNIFICANT CHANGE UP
BLD GP AB SCN SERPL QL: NEGATIVE — SIGNIFICANT CHANGE UP
BUN SERPL-MCNC: 30 MG/DL — HIGH (ref 7–23)
CALCIUM SERPL-MCNC: 8.3 MG/DL — LOW (ref 8.4–10.5)
CAST: 5 /LPF — HIGH (ref 0–4)
CHLORIDE SERPL-SCNC: 95 MMOL/L — LOW (ref 98–107)
CO2 SERPL-SCNC: 23 MMOL/L — SIGNIFICANT CHANGE UP (ref 22–31)
COLOR SPEC: SIGNIFICANT CHANGE UP
CREAT SERPL-MCNC: 0.72 MG/DL — SIGNIFICANT CHANGE UP (ref 0.5–1.3)
DIFF PNL FLD: ABNORMAL
EGFR: 87 ML/MIN/1.73M2 — SIGNIFICANT CHANGE UP
EOSINOPHIL # BLD AUTO: 0.01 K/UL — SIGNIFICANT CHANGE UP (ref 0–0.5)
EOSINOPHIL NFR BLD AUTO: 0.6 % — SIGNIFICANT CHANGE UP (ref 0–6)
GLUCOSE SERPL-MCNC: 99 MG/DL — SIGNIFICANT CHANGE UP (ref 70–99)
GLUCOSE UR QL: 100 MG/DL
HCT VFR BLD CALC: 19.9 % — CRITICAL LOW (ref 34.5–45)
HGB BLD-MCNC: 6.9 G/DL — CRITICAL LOW (ref 11.5–15.5)
IANC: 1.31 K/UL — LOW (ref 1.8–7.4)
IMM GRANULOCYTES NFR BLD AUTO: 1.2 % — HIGH (ref 0–0.9)
KETONES UR-MCNC: 15 MG/DL
LEUKOCYTE ESTERASE UR-ACNC: ABNORMAL
LYMPHOCYTES # BLD AUTO: 0.19 K/UL — LOW (ref 1–3.3)
LYMPHOCYTES # BLD AUTO: 11.6 % — LOW (ref 13–44)
MAGNESIUM SERPL-MCNC: 1.6 MG/DL — SIGNIFICANT CHANGE UP (ref 1.6–2.6)
MCHC RBC-ENTMCNC: 31.7 PG — SIGNIFICANT CHANGE UP (ref 27–34)
MCHC RBC-ENTMCNC: 34.7 GM/DL — SIGNIFICANT CHANGE UP (ref 32–36)
MCV RBC AUTO: 91.3 FL — SIGNIFICANT CHANGE UP (ref 80–100)
MONOCYTES # BLD AUTO: 0.1 K/UL — SIGNIFICANT CHANGE UP (ref 0–0.9)
MONOCYTES NFR BLD AUTO: 6.1 % — SIGNIFICANT CHANGE UP (ref 2–14)
NEUTROPHILS # BLD AUTO: 1.31 K/UL — LOW (ref 1.8–7.4)
NEUTROPHILS NFR BLD AUTO: 79.9 % — HIGH (ref 43–77)
NITRITE UR-MCNC: NEGATIVE — SIGNIFICANT CHANGE UP
NRBC # BLD: 0 /100 WBCS — SIGNIFICANT CHANGE UP (ref 0–0)
NRBC # FLD: 0 K/UL — SIGNIFICANT CHANGE UP (ref 0–0)
PH UR: 7 — SIGNIFICANT CHANGE UP (ref 5–8)
PHOSPHATE SERPL-MCNC: 3.5 MG/DL — SIGNIFICANT CHANGE UP (ref 2.5–4.5)
PLATELET # BLD AUTO: 161 K/UL — SIGNIFICANT CHANGE UP (ref 150–400)
POTASSIUM SERPL-MCNC: 3.7 MMOL/L — SIGNIFICANT CHANGE UP (ref 3.5–5.3)
POTASSIUM SERPL-SCNC: 3.7 MMOL/L — SIGNIFICANT CHANGE UP (ref 3.5–5.3)
PROT SERPL-MCNC: 5.8 G/DL — LOW (ref 6–8.3)
PROT UR-MCNC: 300 MG/DL
RBC # BLD: 2.18 M/UL — LOW (ref 3.8–5.2)
RBC # FLD: 17.3 % — HIGH (ref 10.3–14.5)
RBC CASTS # UR COMP ASSIST: 124 /HPF — HIGH (ref 0–4)
REVIEW: SIGNIFICANT CHANGE UP
RH IG SCN BLD-IMP: POSITIVE — SIGNIFICANT CHANGE UP
RH IG SCN BLD-IMP: POSITIVE — SIGNIFICANT CHANGE UP
SODIUM SERPL-SCNC: 132 MMOL/L — LOW (ref 135–145)
SP GR SPEC: 1.02 — SIGNIFICANT CHANGE UP (ref 1–1.03)
SQUAMOUS # UR AUTO: 3 /HPF — SIGNIFICANT CHANGE UP (ref 0–5)
UROBILINOGEN FLD QL: 1 MG/DL — SIGNIFICANT CHANGE UP (ref 0.2–1)
WBC # BLD: 1.64 K/UL — LOW (ref 3.8–10.5)
WBC # FLD AUTO: 1.64 K/UL — LOW (ref 3.8–10.5)
WBC UR QL: 44 /HPF — HIGH (ref 0–5)

## 2024-03-18 PROCEDURE — 99223 1ST HOSP IP/OBS HIGH 75: CPT

## 2024-03-18 PROCEDURE — 99285 EMERGENCY DEPT VISIT HI MDM: CPT

## 2024-03-18 RX ORDER — ACETAMINOPHEN 500 MG
750 TABLET ORAL ONCE
Refills: 0 | Status: COMPLETED | OUTPATIENT
Start: 2024-03-18 | End: 2024-03-18

## 2024-03-18 RX ORDER — SODIUM CHLORIDE 9 MG/ML
500 INJECTION INTRAMUSCULAR; INTRAVENOUS; SUBCUTANEOUS ONCE
Refills: 0 | Status: COMPLETED | OUTPATIENT
Start: 2024-03-18 | End: 2024-03-18

## 2024-03-18 RX ADMIN — SODIUM CHLORIDE 500 MILLILITER(S): 9 INJECTION INTRAMUSCULAR; INTRAVENOUS; SUBCUTANEOUS at 19:10

## 2024-03-18 NOTE — ED ADULT TRIAGE NOTE - CHIEF COMPLAINT QUOTE
pt on chemo for rt breast ca, c/o of generalized malaise, weakness and poor po intake for few days, pt denies any chest pain or sob, low H&H

## 2024-03-18 NOTE — ED PROVIDER NOTE - PHYSICAL EXAMINATION
Constitutional: VS reviewed. Alert and orientedx3, well appearing, no apparent distress  HEENT: Atraumatic, EOMI, PERRL, + pale conjunctiva   CV: RRR  Lungs: Clear and equal bilaterally, no wheezes, rales or crackles  Abdomen: Soft, nondistended, nontender  [facility]: No deformities  Skin: Warm and dry. As visualized no rashes, lesions, bruising or erythema  Neuro: Strength 5/5 in all extremities. Sensation intact. No pronator drift, slurred speech or facial droop.   Lymph: No pitting edema in extremities. Constitutional: VS reviewed. Alert and orientedx3, well appearing, no apparent distress  HEENT: Atraumatic, EOMI, PERRL, + pale conjunctiva   CV: RRR  Lungs: Clear and equal bilaterally, no wheezes, rales or crackles  Abdomen: Soft, nondistended, nontender  Rectal: (Chaperone RN Chaya) No external or internal hemorrhoids. No stool on JOSELO.   [facility]: No deformities  Skin: Warm and dry. As visualized no rashes, lesions, bruising or erythema  Neuro: Strength 5/5 in all extremities. Sensation intact. No pronator drift, slurred speech or facial droop.   Lymph: No pitting edema in extremities.

## 2024-03-18 NOTE — H&P ADULT - NSHPPHYSICALEXAM_GEN_ALL_CORE
PHYSICAL EXAM:      Constitutional: NAD, well-groomed, well-developed  HEENT: EOMI, Normal Hearing  Neck: No LAD, No JVD  Back: Normal spine flexure, No CVA tenderness  Respiratory: CTAB  Cardiovascular: S1 and S2, RRR, no M/G/R  Gastrointestinal: BS+, soft, NT/ND, no flank pain  Extremities: No peripheral edema  Vascular: 2+ peripheral pulses  Neurological: A/O x 3, no focal deficits  Psychiatric: Normal mood, normal affect  Musculoskeletal: 5/5 strength b/l upper and lower extremities  Skin: No rashes

## 2024-03-18 NOTE — ED ADULT NURSE NOTE - NSFALLRISKINTERV_ED_ALL_ED
Assistance OOB with selected safe patient handling equipment if applicable/Assistance with ambulation/Communicate fall risk and risk factors to all staff, patient, and family/Encourage patient to sit up slowly, dangle for a short time, stand at bedside before walking/Monitor gait and stability/Orthostatic vital signs/Provide patient with walking aids/Provide visual cue: yellow wristband, yellow gown, etc/Reinforce activity limits and safety measures with patient and family/Call bell, personal items and telephone in reach/Instruct patient to call for assistance before getting out of bed/chair/stretcher/Non-slip footwear applied when patient is off stretcher/[place] to call system/Physically safe environment - no spills, clutter or unnecessary equipment/Purposeful Proactive Rounding/Room/bathroom lighting operational, light cord in reach

## 2024-03-18 NOTE — H&P ADULT - PROBLEM SELECTOR PLAN 2
- isolation  -cdiff /gi pcr studies ordered here. Alternatively, can contact outpt  [facility] [place] to follow results of stool specimens that were sent there

## 2024-03-18 NOTE — H&P ADULT - HISTORY OF PRESENT ILLNESS
prior anemia in Feb attributed to malignancy and treatment in setting of pancytopenia  72 yo f  h/o breast cancer s/p Taxol 3/14 [facility]  setting of weakness, dysuria, and diarrhea. Sent to [facility] in setting of hypotension and low Hb. Here in ed Hb 6.9 compared to 9 in feb. at that time pt also found  to be  anemic requiring  prbc x 2; anemia then attributed to malignancy and treatment in setting of pancytopenia. Pt also  with leukopenia and relative thrombocytopenia currently. Denies dark or bloody stools. Reports undergoing endoscopy many years ago.   UA c/w sterile pyuria, denies recent abx use

## 2024-03-18 NOTE — ED PROVIDER NOTE - CLINICAL SUMMARY MEDICAL DECISION MAKING FREE TEXT BOX
72 y/o F with PMHx of breast CA on chemo (last dose 3/14/24), HTN, HLD presents to the ED from [facility] clinic for 1 week of dysuria and generalized weakness. Pt having difficulty ambulating due to weakness. Low H&H on outpatient labs. Pale conjunctiva on exam. No abd TTP. Differentials include but not limited to UTI, pyelo, sepsis, anemia, GIB, FTT. Plan for labs, UA/UC. Will transfuse as needed. Dispo pending labs.

## 2024-03-18 NOTE — H&P ADULT - PROBLEM SELECTOR PLAN 3
-sterile pyuria may suggest prior abx use although pt denies. May consider atypical organisms as well; for now, will place on ceftriaxone, follow cultures and determine if dysuria resolving

## 2024-03-18 NOTE — H&P ADULT - PROBLEM SELECTOR PLAN 1
-given recurrent pancytopenia, suspect  anemia again stemming from chemo or disease effect. Pt denies dark or bloody stool  -However, will place on ppi, defer to attending of record to determine if GI to be called

## 2024-03-18 NOTE — H&P ADULT - NSHPLABSRESULTS_GEN_ALL_CORE
6.9    1.64  )-----------( 161      ( 18 Mar 2024 18:35 )             19.9     03-18    132<L>  |  95<L>  |  30<H>  ----------------------------<  99  3.7   |  23  |  0.72    Ca    8.3<L>      18 Mar 2024 18:35  Phos  3.5     03-18  Mg     1.60     03-18    TPro  5.8<L>  /  Alb  2.9<L>  /  TBili  0.9  /  DBili  x   /  AST  27  /  ALT  16  /  AlkPhos  60  03-18    CAPILLARY BLOOD GLUCOSE      POCT Blood Glucose.: 112 mg/dL (18 Mar 2024 16:52)      Urinalysis Basic - ( 18 Mar 2024 21:27 )    Color: Dark Yellow / Appearance: Cloudy / S.022 / pH: x  Gluc: x / Ketone: 15 mg/dL  / Bili: Negative / Urobili: 1.0 mg/dL   Blood: x / Protein: 300 mg/dL / Nitrite: Negative   Leuk Esterase: Small / RBC: 124 /HPF / WBC 44 /HPF   Sq Epi: x / Non Sq Epi: 3 /HPF / Bacteria: Negative /HPF      Vital Signs Last 24 Hrs  T(C): 36.4 (19 Mar 2024 02:35), Max: 37.1 (18 Mar 2024 18:23)  T(F): 97.5 (19 Mar 2024 02:35), Max: 98.8 (18 Mar 2024 18:23)  HR: 83 (19 Mar 2024 02:35) (81 - 88)  BP: 147/83 (19 Mar 2024 02:35) (107/79 - 147/83)  BP(mean): --  RR: 18 (19 Mar 2024 02:35) (18 - 20)  SpO2: 100% (19 Mar 2024 02:35) (97% - 100%)    Parameters below as of 19 Mar 2024 02:35  Patient On (Oxygen Delivery Method): room air

## 2024-03-18 NOTE — ED PROVIDER NOTE - OBJECTIVE STATEMENT
72 y/o F with PMHx of breast CA on chemo (last dose 3/14/24), HTN, HLD presents to the ED from [facility] clinic for 1 week of dysuria and generalized weakness. Pt endorses burning with urination and loose stool for 1 week along with urinary incontinence. Pt also endorsing worsening generalized weakness requiring family to help her get up for last few days. Pt states she has not been eating or drinking as much due to decreased appetite. Outpatient h&h low (7.3/21.9). Denies fevers, chills, headaches, vision changes, CP, SOB, abd pain, n/v, hematuria.

## 2024-03-18 NOTE — H&P ADULT - NSHPREVIEWOFSYSTEMS_GEN_ALL_CORE
Review of Systems:   CONSTITUTIONAL: No fever  EYES: No eye pain, visual disturbances, or discharge  ENMT:  No difficulty hearing, tinnitus, vertigo; No sinus or throat pain  NECK: No pain or stiffness  RESPIRATORY: No cough, wheezing, chills or hemoptysis; No shortness of breath  CARDIOVASCULAR: No chest pain, palpitations, dizziness, or leg swelling  GASTROINTESTINAL: improved lower abd pain. + diarrhea   GENITOURINARY: + dysuria  NEUROLOGICAL: No headaches  SKIN: No itching, burning, rashes, or lesions   MUSCULOSKELETAL: No joint pain or swelling; No muscle, back, or extremity tray

## 2024-03-18 NOTE — ED PROVIDER NOTE - ATTENDING CONTRIBUTION TO CARE
The patient is a 73y Female who has a past medical and surgery history of HTN Breast cancer on chemotherapy [place] [facility] c/o weaknesss dysuria burning ? incontinence pt on chemo for rt breast ca. Also noted to be anemic on outpt labs. No chest pain or sob,  weakness, low H&H   Vital Signs Last 24 Hrs  T(F): 98 HR: 85 BP: 120/86 RR: 18 SpO2: 98% (18 Mar 2024 16:49)   PE: as described; my additions and exceptions are noted in the chart    DATA:  EKG: pending at time of evaluation  LAB: Pending at time of evaluation    IMPRESSION/RISK:  Dx= Symptomatic anemia   Consideration include Will need transfusion if numbers accurate; will also screen for UTI   Plan  as above  reassess  TBA

## 2024-03-18 NOTE — ED PROVIDER NOTE - CARE PLAN
1 Principal Discharge DX:	Anemia  Secondary Diagnosis:	Adult failure to thrive  Secondary Diagnosis:	Breast cancer

## 2024-03-18 NOTE — ED ADULT NURSE NOTE - OBJECTIVE STATEMENT
hx of breast CA with active chemotherapy. pt c/o increased pain upon urination x 3 weeks. sent to ED d/t low hemoglobin findings. pt A&OX4, able to speak in clear complete sentences. pt also reports + diarrhea and generalized weakness. pt connected to cardiac monitor, RR even and unlabored. MD at bedside

## 2024-03-18 NOTE — H&P ADULT - PROBLEM/PLAN-6
DISPLAY PLAN FREE TEXT O-T Advancement Flap Text: The defect edges were debeveled with a #15 scalpel blade.  Given the location of the defect, shape of the defect and the proximity to free margins an O-T advancement flap was deemed most appropriate.  Using a sterile surgical marker, an appropriate advancement flap was drawn incorporating the defect and placing the expected incisions within the relaxed skin tension lines where possible.    The area thus outlined was incised deep to adipose tissue with a #15 scalpel blade.  The skin margins were undermined to an appropriate distance in all directions utilizing iris scissors.

## 2024-03-18 NOTE — H&P ADULT - REASON FOR ADMISSION
Diagnostic PSG completed per provider order.  Patient did not meet criteria for PAP therapy.   anemia, generalized weakness,

## 2024-03-19 DIAGNOSIS — R30.0 DYSURIA: ICD-10-CM

## 2024-03-19 DIAGNOSIS — R71.0 PRECIPITOUS DROP IN HEMATOCRIT: ICD-10-CM

## 2024-03-19 DIAGNOSIS — Z29.9 ENCOUNTER FOR PROPHYLACTIC MEASURES, UNSPECIFIED: ICD-10-CM

## 2024-03-19 DIAGNOSIS — Z79.899 OTHER LONG TERM (CURRENT) DRUG THERAPY: ICD-10-CM

## 2024-03-19 DIAGNOSIS — R19.7 DIARRHEA, UNSPECIFIED: ICD-10-CM

## 2024-03-19 DIAGNOSIS — I10 ESSENTIAL (PRIMARY) HYPERTENSION: ICD-10-CM

## 2024-03-19 PROBLEM — Z91.89 OTHER SPECIFIED PERSONAL RISK FACTORS, NOT ELSEWHERE CLASSIFIED: Chronic | Status: ACTIVE | Noted: 2024-02-12

## 2024-03-19 PROBLEM — C50.919 MALIGNANT NEOPLASM OF UNSPECIFIED SITE OF UNSPECIFIED FEMALE BREAST: Chronic | Status: ACTIVE | Noted: 2024-02-12

## 2024-03-19 LAB
ADD ON TEST-SPECIMEN IN LAB: SIGNIFICANT CHANGE UP
ALBUMIN SERPL ELPH-MCNC: 2.3 G/DL — LOW (ref 3.3–5)
ALP SERPL-CCNC: 51 U/L — SIGNIFICANT CHANGE UP (ref 40–120)
ALT FLD-CCNC: 9 U/L — SIGNIFICANT CHANGE UP (ref 4–33)
ANION GAP SERPL CALC-SCNC: 13 MMOL/L — SIGNIFICANT CHANGE UP (ref 7–14)
AST SERPL-CCNC: 22 U/L — SIGNIFICANT CHANGE UP (ref 4–32)
BASOPHILS # BLD AUTO: 0.01 K/UL — SIGNIFICANT CHANGE UP (ref 0–0.2)
BASOPHILS NFR BLD AUTO: 0.8 % — SIGNIFICANT CHANGE UP (ref 0–2)
BILIRUB SERPL-MCNC: 1.1 MG/DL — SIGNIFICANT CHANGE UP (ref 0.2–1.2)
BUN SERPL-MCNC: 21 MG/DL — SIGNIFICANT CHANGE UP (ref 7–23)
CALCIUM SERPL-MCNC: 6.8 MG/DL — LOW (ref 8.4–10.5)
CHLORIDE SERPL-SCNC: 105 MMOL/L — SIGNIFICANT CHANGE UP (ref 98–107)
CO2 SERPL-SCNC: 16 MMOL/L — LOW (ref 22–31)
CREAT ?TM UR-MCNC: 34 MG/DL — SIGNIFICANT CHANGE UP
CREAT SERPL-MCNC: 0.44 MG/DL — LOW (ref 0.5–1.3)
CULTURE RESULTS: SIGNIFICANT CHANGE UP
EGFR: 101 ML/MIN/1.73M2 — SIGNIFICANT CHANGE UP
EOSINOPHIL # BLD AUTO: 0.02 K/UL — SIGNIFICANT CHANGE UP (ref 0–0.5)
EOSINOPHIL NFR BLD AUTO: 1.6 % — SIGNIFICANT CHANGE UP (ref 0–6)
GLUCOSE SERPL-MCNC: 64 MG/DL — LOW (ref 70–99)
HCT VFR BLD CALC: 21.9 % — LOW (ref 34.5–45)
HCT VFR BLD CALC: 24.1 % — LOW (ref 34.5–45)
HCT VFR BLD CALC: 26.8 % — LOW (ref 34.5–45)
HGB BLD-MCNC: 7.4 G/DL — LOW (ref 11.5–15.5)
HGB BLD-MCNC: 8.1 G/DL — LOW (ref 11.5–15.5)
HGB BLD-MCNC: 9.2 G/DL — LOW (ref 11.5–15.5)
IANC: 1.06 K/UL — LOW (ref 1.8–7.4)
IMM GRANULOCYTES NFR BLD AUTO: 0.8 % — SIGNIFICANT CHANGE UP (ref 0–0.9)
LYMPHOCYTES # BLD AUTO: 0.12 K/UL — LOW (ref 1–3.3)
LYMPHOCYTES # BLD AUTO: 9.4 % — LOW (ref 13–44)
MAGNESIUM SERPL-MCNC: 1.2 MG/DL — LOW (ref 1.6–2.6)
MCHC RBC-ENTMCNC: 31 PG — SIGNIFICANT CHANGE UP (ref 27–34)
MCHC RBC-ENTMCNC: 31.7 PG — SIGNIFICANT CHANGE UP (ref 27–34)
MCHC RBC-ENTMCNC: 31.8 PG — SIGNIFICANT CHANGE UP (ref 27–34)
MCHC RBC-ENTMCNC: 33.6 GM/DL — SIGNIFICANT CHANGE UP (ref 32–36)
MCHC RBC-ENTMCNC: 33.8 GM/DL — SIGNIFICANT CHANGE UP (ref 32–36)
MCHC RBC-ENTMCNC: 34.3 GM/DL — SIGNIFICANT CHANGE UP (ref 32–36)
MCV RBC AUTO: 92.3 FL — SIGNIFICANT CHANGE UP (ref 80–100)
MCV RBC AUTO: 92.4 FL — SIGNIFICANT CHANGE UP (ref 80–100)
MCV RBC AUTO: 94 FL — SIGNIFICANT CHANGE UP (ref 80–100)
MONOCYTES # BLD AUTO: 0.06 K/UL — SIGNIFICANT CHANGE UP (ref 0–0.9)
MONOCYTES NFR BLD AUTO: 4.7 % — SIGNIFICANT CHANGE UP (ref 2–14)
NEUTROPHILS # BLD AUTO: 1.06 K/UL — LOW (ref 1.8–7.4)
NEUTROPHILS NFR BLD AUTO: 82.7 % — HIGH (ref 43–77)
NRBC # BLD: 0 /100 WBCS — SIGNIFICANT CHANGE UP (ref 0–0)
NRBC # FLD: 0 K/UL — SIGNIFICANT CHANGE UP (ref 0–0)
PLATELET # BLD AUTO: 112 K/UL — LOW (ref 150–400)
PLATELET # BLD AUTO: 124 K/UL — LOW (ref 150–400)
PLATELET # BLD AUTO: 129 K/UL — LOW (ref 150–400)
POTASSIUM SERPL-MCNC: 2.9 MMOL/L — CRITICAL LOW (ref 3.5–5.3)
POTASSIUM SERPL-SCNC: 2.9 MMOL/L — CRITICAL LOW (ref 3.5–5.3)
POTASSIUM UR-SCNC: 27.6 MMOL/L — SIGNIFICANT CHANGE UP
PROT SERPL-MCNC: 4.6 G/DL — LOW (ref 6–8.3)
RBC # BLD: 2.33 M/UL — LOW (ref 3.8–5.2)
RBC # BLD: 2.61 M/UL — LOW (ref 3.8–5.2)
RBC # BLD: 2.9 M/UL — LOW (ref 3.8–5.2)
RBC # FLD: 15.9 % — HIGH (ref 10.3–14.5)
RBC # FLD: 15.9 % — HIGH (ref 10.3–14.5)
RBC # FLD: 16.2 % — HIGH (ref 10.3–14.5)
SODIUM SERPL-SCNC: 134 MMOL/L — LOW (ref 135–145)
SPECIMEN SOURCE: SIGNIFICANT CHANGE UP
WBC # BLD: 1.28 K/UL — LOW (ref 3.8–10.5)
WBC # BLD: 1.6 K/UL — LOW (ref 3.8–10.5)
WBC # BLD: 1.76 K/UL — LOW (ref 3.8–10.5)
WBC # FLD AUTO: 1.28 K/UL — LOW (ref 3.8–10.5)
WBC # FLD AUTO: 1.6 K/UL — LOW (ref 3.8–10.5)
WBC # FLD AUTO: 1.76 K/UL — LOW (ref 3.8–10.5)

## 2024-03-19 RX ORDER — PANTOPRAZOLE SODIUM 20 MG/1
40 TABLET, DELAYED RELEASE ORAL
Refills: 0 | Status: DISCONTINUED | OUTPATIENT
Start: 2024-03-19 | End: 2024-03-25

## 2024-03-19 RX ORDER — CARVEDILOL PHOSPHATE 80 MG/1
1 CAPSULE, EXTENDED RELEASE ORAL
Refills: 0 | DISCHARGE

## 2024-03-19 RX ORDER — CEFTRIAXONE 500 MG/1
1000 INJECTION, POWDER, FOR SOLUTION INTRAMUSCULAR; INTRAVENOUS EVERY 24 HOURS
Refills: 0 | Status: COMPLETED | OUTPATIENT
Start: 2024-03-19 | End: 2024-03-21

## 2024-03-19 RX ORDER — SODIUM CHLORIDE 9 MG/ML
1000 INJECTION, SOLUTION INTRAVENOUS
Refills: 0 | Status: DISCONTINUED | OUTPATIENT
Start: 2024-03-19 | End: 2024-03-21

## 2024-03-19 RX ORDER — POTASSIUM CHLORIDE 20 MEQ
40 PACKET (EA) ORAL ONCE
Refills: 0 | Status: COMPLETED | OUTPATIENT
Start: 2024-03-19 | End: 2024-03-19

## 2024-03-19 RX ORDER — RAMIPRIL 5 MG
1 CAPSULE ORAL
Refills: 0 | DISCHARGE

## 2024-03-19 RX ORDER — CARVEDILOL PHOSPHATE 80 MG/1
3.12 CAPSULE, EXTENDED RELEASE ORAL EVERY 12 HOURS
Refills: 0 | Status: DISCONTINUED | OUTPATIENT
Start: 2024-03-19 | End: 2024-03-25

## 2024-03-19 RX ORDER — MAGNESIUM SULFATE 500 MG/ML
2 VIAL (ML) INJECTION ONCE
Refills: 0 | Status: COMPLETED | OUTPATIENT
Start: 2024-03-19 | End: 2024-03-19

## 2024-03-19 RX ORDER — SODIUM CHLORIDE 9 MG/ML
1000 INJECTION INTRAMUSCULAR; INTRAVENOUS; SUBCUTANEOUS
Refills: 0 | Status: DISCONTINUED | OUTPATIENT
Start: 2024-03-19 | End: 2024-03-19

## 2024-03-19 RX ADMIN — SODIUM CHLORIDE 100 MILLILITER(S): 9 INJECTION INTRAMUSCULAR; INTRAVENOUS; SUBCUTANEOUS at 05:57

## 2024-03-19 RX ADMIN — SODIUM CHLORIDE 50 MILLILITER(S): 9 INJECTION, SOLUTION INTRAVENOUS at 14:36

## 2024-03-19 RX ADMIN — Medication 40 MILLIEQUIVALENT(S): at 07:43

## 2024-03-19 RX ADMIN — CEFTRIAXONE 100 MILLIGRAM(S): 500 INJECTION, POWDER, FOR SOLUTION INTRAMUSCULAR; INTRAVENOUS at 05:57

## 2024-03-19 RX ADMIN — CARVEDILOL PHOSPHATE 3.12 MILLIGRAM(S): 80 CAPSULE, EXTENDED RELEASE ORAL at 18:30

## 2024-03-19 RX ADMIN — Medication 40 MILLIEQUIVALENT(S): at 12:29

## 2024-03-19 RX ADMIN — Medication 25 GRAM(S): at 09:26

## 2024-03-19 RX ADMIN — CARVEDILOL PHOSPHATE 3.12 MILLIGRAM(S): 80 CAPSULE, EXTENDED RELEASE ORAL at 05:56

## 2024-03-19 RX ADMIN — PANTOPRAZOLE SODIUM 40 MILLIGRAM(S): 20 TABLET, DELAYED RELEASE ORAL at 10:21

## 2024-03-19 NOTE — CONSULT NOTE ADULT - ASSESSMENT
This is a 73 year old female with right breast cancer who presents with generalized weakness, dysuria, and diarrhea.    1. 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on Harris [place] [facility] [place]     2. Pancytopenia   -- Suspect due to underlying malignancy and recent chemotherapy   -- Will check for nutritional deficiencies and hemolysis   -- ANC 1.06, cont to monitor for now. will hold off on growth factors   -- Monitor CBC and transfuse to maintain hg >7    3. Dysuria   -- Urine culture pending   -- Cont ceftriaxone for now     4. Diarrhea   -- From [facility]- C diff and GI PCR neg   -- Cont supportive measures, monitor for improvement     Will continue to follow.    MINERVA WisemanC  Hematology/Oncology  [facility]  [phone] (office)  [phone] (alt office)  Evenings and weekends please call MD on call or office

## 2024-03-19 NOTE — CONSULT NOTE ADULT - NS ATTEND AMEND GEN_ALL_CORE FT
patient with breast cancer on neoadjuvant chemo  on taxol weekly, c/b anemia and UTI  For PRBC to goal>7, and antibiotics   will follow Dressing: pressure dressing

## 2024-03-19 NOTE — PROGRESS NOTE ADULT - SUBJECTIVE AND OBJECTIVE BOX
Name of Patient : YOSEF CHILD  MRN: [id number]  Date of visit: 03-19-24     Subjective: Patient seen and examined. No new events except as noted.   feeling better  /P PRBC traansfusion     REVIEW OF SYSTEMS:    CONSTITUTIONAL: No weakness, fevers or chills  EYES/ENT: No visual changes;  No vertigo or throat pain   NECK: No pain or stiffness  RESPIRATORY: No cough, wheezing, hemoptysis; No shortness of breath  CARDIOVASCULAR: No chest pain or palpitations  GASTROINTESTINAL: No abdominal or epigastric pain. No nausea, vomiting, or hematemesis; No diarrhea or constipation. No melena or hematochezia.  GENITOURINARY: No dysuria, frequency or hematuria  NEUROLOGICAL: No numbness or weakness  SKIN: No itching, burning, rashes, or lesions   All other review of systems is negative unless indicated above.    MEDICATIONS:  MEDICATIONS  (STANDING):  carvedilol 3.125 milliGRAM(s) Oral every 12 hours  cefTRIAXone   IVPB 1000 milliGRAM(s) IV Intermittent every 24 hours  lactated ringers 1000 milliLiter(s) (50 mL/Hr) IV Continuous <Continuous>  pantoprazole    Tablet 40 milliGRAM(s) Oral before breakfast      PHYSICAL EXAM:  T(C): 36.7 (03-19-24 @ 18:20), Max: 37.1 (03-19-24 @ 17:25)  HR: 91 (03-19-24 @ 18:20) (81 - 91)  BP: 145/81 (03-19-24 @ 18:20) (123/82 - 147/83)  RR: 16 (03-19-24 @ 18:20) (16 - 20)  SpO2: 98% (03-19-24 @ 18:20) (96% - 100%)  Wt(kg): --  I&O's Summary    Height (cm): 160 (03-19 @ 18:20)  Weight (kg): 46.3 (03-19 @ 18:20)  BMI (kg/m2): 18.1 (03-19 @ 18:20)  BSA (m2): 1.45 (03-19 @ 18:20)    Appearance: Normal	  HEENT:  PERRLA   Lymphatic: No lymphadenopathy   Cardiovascular: Normal S1 S2, no JVD  Respiratory: normal effort , clear  Gastrointestinal:  Soft, Non-tender  Skin: No rashes,  warm to touch  Psychiatry:  Mood & affect appropriate  Musculuskeletal: No edema    recent labs, Imaging and EKGs personally reviewed                           8.1    1.60  )-----------( 129      ( 19 Mar 2024 17:30 )             24.1               03-19    134<L>  |  105  |  21  ----------------------------<  64<L>  2.9<LL>   |  16<L>  |  0.44<L>    Ca    6.8<L>      19 Mar 2024 06:00  Phos  3.5     03-18  Mg     1.20     03-19    TPro  4.6<L>  /  Alb  2.3<L>  /  TBili  1.1  /  DBili  x   /  AST  22  /  ALT  9   /  AlkPhos  51  03-19                       Urinalysis Basic - ( 19 Mar 2024 06:00 )    Color: x / Appearance: x / SG: x / pH: x  Gluc: 64 mg/dL / Ketone: x  / Bili: x / Urobili: x   Blood: x / Protein: x / Nitrite: x   Leuk Esterase: x / RBC: x / WBC x   Sq Epi: x / Non Sq Epi: x / Bacteria: x

## 2024-03-19 NOTE — PATIENT PROFILE ADULT - FALL HARM RISK - HARM RISK INTERVENTIONS

## 2024-03-19 NOTE — ED ADULT NURSE REASSESSMENT NOTE - NS ED NURSE REASSESS COMMENT FT1
Pt resting in stretcher, no signs of acute distress noted. Pt blood transfusion completed at this time, VS as noted in flowsheet. Pt denies fevers, chills, headache, dizziness, SOB, chest pain, itchiness at this time. resppirations even and unlabored. NSR on cardiac monitor. safety maintained, bed locked in lowest position. call bell within reach
blood transfusion initiated with 2 RN at bedside. pt tolerated well, RR even and unlabored, VSS. rate increased to 100 cc/hr as per orders. safety measures maintained, side rails up x2
received report from night RN. Pt is awake and alert at this time. changed and repositioned in bed. NSR on monitor, vitals as noted. Medication running per MD orders. IV clean and intact, pending bed.
Break RN note- Patient resting quietly in bed, breathing even and nonlabored. No acute distress. Patient denies any pain at this time. Fluids administered as ordered. Awaiting lab results. Safety maintained. Patient stable upon exiting the room.
 used

## 2024-03-19 NOTE — PHARMACOTHERAPY INTERVENTION NOTE - COMMENTS
Medication history is incomplete. Unable to verify patient's medication list with two sources. Source: [facility]    As per pharmacy they also filled :  - Ramipril 2.5mg capsules: 1-2 capsules daily as directed  (1/2024 for a 30 day supply)

## 2024-03-19 NOTE — CONSULT NOTE ADULT - SUBJECTIVE AND OBJECTIVE BOX
Reason for consult: breast cancer     HPI:  72 yo f  h/o breast cancer s/p Taxol 3/14 was evaluated earlier today [place] [facility] in  setting of weakness, dysuria, and diarrhea. Sent to [facility] in setting of hypotension and low Hb. Here in ed Hb 6.9 compared to 9 in feb. at that time pt also found  to be  anemic requiring  prbc x 2; anemia then attributed to malignancy and treatment in setting of pancytopenia. Pt also  with leukopenia and relative thrombocytopenia currently. Denies dark or bloody stools. Reports undergoing endoscopy many years ago.   UA c/w sterile pyuria, denies recent abx use  (18 Mar 2024 23:42)    Hematology/Oncology consulted on this 73 year old female with R breast cancer who presented with anemia, dysuria, and diarrhea. Patient is under care of Dr. Easton Harris [place] [facility] [place] of her right triple negative IDC. She was diagnosed via US-guided right breast biopsy 10/26/2023 which confirmed poorly-differentiated triple negative invasive breast cancer. She began neoadjuvant treatment with AC + pembrolizumab 12/08/2023, completed 4 cycles of AC on 02/02/2024. She is currently on weekly paclitaxel, last dose 03/14/2024.     PAST MEDICAL & SURGICAL HISTORY:  Essential hypertension      Breast cancer      At risk for infection due to chemotherapy      Carcinoma of breast treated with adjuvant chemotherapy          FAMILY HISTORY:      Alochol: Denied  Smoking: Nonsmoker  Drug Use: Denied  Marital Status:         Allergies    No Known Allergies    Intolerances        MEDICATIONS  (STANDING):  carvedilol 3.125 milliGRAM(s) Oral every 12 hours  cefTRIAXone   IVPB 1000 milliGRAM(s) IV Intermittent every 24 hours  pantoprazole    Tablet 40 milliGRAM(s) Oral before breakfast  potassium chloride   Powder 40 milliEquivalent(s) Oral once  sodium chloride 0.9%. 1000 milliLiter(s) (100 mL/Hr) IV Continuous <Continuous>    MEDICATIONS  (PRN):      ROS  No fever, sweats, chills  No epistaxis, HA, sore throat  No CP, SOB, cough, sputum  No n/v/d, abd pain, melena, hematochezia  No edema  No rash  No anxiety  No back pain, joint pain  No bleeding, bruising  No dysuria, hematuria    T(C): 36.8 (03-19-24 @ 10:10), Max: 37.1 (03-18-24 @ 18:23)  HR: 84 (03-19-24 @ 10:10) (81 - 88)  BP: 134/84 (03-19-24 @ 10:10) (107/79 - 147/83)  RR: 20 (03-19-24 @ 10:10) (16 - 20)  SpO2: 96% (03-19-24 @ 10:10) (96% - 100%)  Wt(kg): --    PE  NAD  Awake, alert  Anicteric, MMM  RRR  CTAB  Abd soft, NT, ND  No c/c/e  No rash grossly  FROM                          7.4    1.28  )-----------( 112      ( 19 Mar 2024 06:00 )             21.9       03-19    134<L>  |  105  |  21  ----------------------------<  64<L>  2.9<LL>   |  16<L>  |  0.44<L>    Ca    6.8<L>      19 Mar 2024 06:00  Phos  3.5     03-18  Mg     1.20     03-19    TPro  4.6<L>  /  Alb  2.3<L>  /  TBili  1.1  /  DBili  x   /  AST  22  /  ALT  9   /  AlkPhos  51  03-19   Reason for consult: breast cancer     HPI:  74 yo f  h/o breast cancer s/p Taxol 3/14 was [place] earlier today [place] [facility] [place]  setting of weakness, dysuria, and diarrhea. Sent to [facility] in setting of hypotension and low Hb. Here in ed Hb 6.9 compared to 9 in feb. at that time pt also found  to be  anemic requiring  prbc x 2; anemia then attributed to malignancy and treatment in setting of pancytopenia. Pt also  with leukopenia and relative thrombocytopenia currently. Denies dark or bloody stools. Reports undergoing endoscopy many years ago.   UA c/w sterile pyuria, denies recent abx use  (18 Mar 2024 23:42)    Hematology/Oncology consulted on this 73 year old female with R breast cancer who presented with anemia, dysuria, and diarrhea. Patient is [place] Easton [place] [facility] [place] right triple negative IDC. She was diagnosed via US-guided right breast biopsy 10/26/2023 which confirmed poorly-differentiated triple negative invasive breast cancer. She began neoadjuvant treatment with AC + pembrolizumab 12/08/2023, completed 4 cycles of AC on 02/02/2024. She is currently on weekly paclitaxel, last dose 03/14/2024.   Pt seen this afternoon. She reports feeling some improvement in her symptoms currently.    PAST MEDICAL & SURGICAL HISTORY:  Essential hypertension      Breast cancer      At risk for infection due to chemotherapy      Carcinoma of breast treated with adjuvant chemotherapy          FAMILY HISTORY:      Alochol: Denied  Smoking: Nonsmoker  Drug Use: Denied  Marital Status:         Allergies    No Known Allergies    Intolerances        MEDICATIONS  (STANDING):  carvedilol 3.125 milliGRAM(s) Oral every 12 hours  cefTRIAXone   IVPB 1000 milliGRAM(s) IV Intermittent every 24 hours  pantoprazole    Tablet 40 milliGRAM(s) Oral before breakfast  potassium chloride   Powder 40 milliEquivalent(s) Oral once  sodium chloride 0.9%. 1000 milliLiter(s) (100 mL/Hr) IV Continuous <Continuous>    MEDICATIONS  (PRN):      ROS  generalized weakness   No fever, sweats, chills  No epistaxis, HA, sore throat  No CP, SOB, cough, sputum  diarrhea. No n/v.  No edema  No rash  No anxiety  No back pain, joint pain  No bleeding, bruising  dysuria     T(C): 36.8 (03-19-24 @ 10:10), Max: 37.1 (03-18-24 @ 18:23)  HR: 84 (03-19-24 @ 10:10) (81 - 88)  BP: 134/84 (03-19-24 @ 10:10) (107/79 - 147/83)  RR: 20 (03-19-24 @ 10:10) (16 - 20)  SpO2: 96% (03-19-24 @ 10:10) (96% - 100%)  Wt(kg): --    PE  frail, elderly appearing female in NAD  Awake, alert  Anicteric, MMM  No c/c/e  No rash grossly                            7.4    1.28  )-----------( 112      ( 19 Mar 2024 06:00 )             21.9       03-19    134<L>  |  105  |  21  ----------------------------<  64<L>  2.9<LL>   |  16<L>  |  0.44<L>    Ca    6.8<L>      19 Mar 2024 06:00  Phos  3.5     03-18  Mg     1.20     03-19    TPro  4.6<L>  /  Alb  2.3<L>  /  TBili  1.1  /  DBili  x   /  AST  22  /  ALT  9   /  AlkPhos  51  03-19

## 2024-03-19 NOTE — CONSULT NOTE ADULT - SUBJECTIVE AND OBJECTIVE BOX
[facility] NEPHROLOGY- CONSULTATION NOTE    73y Female with history of below presents with diarrhea. Nephrology consulted for hypokalemia.    Patient with h/o breast CA on chemotherapy noted to have a low serum potassium this morning (normal on admission). Patient admits to diarrhea prior to admission but none since admission. Patient not on any diuretics. Patient states her weakness is improving s/p PRBC.    Patient with dysuria for 2 weeks but states she has not had treatment with antibiotics as unable to give a urine culture as an outpatient.    Patient was evaluated by nephrology on prior admission for electrolyte abnormalities for which HCTZ was discontinued on discharge.    REVIEW OF SYSTEMS:  Gen: no fevers, + weakness  HEENT: no rhinorrhea  Neck: no sore throat  Cards: no chest pain  Resp: no dyspnea  GI: no nausea or vomiting, + diarrhea now resolved  : + dysuria, no gross hematuria  Vascular: no LE edema  Derm: no rashes  Neuro: no numbness/tingling    No Known Allergies      Home Medications Reviewed  Hospital Medications:   MEDICATIONS  (STANDING):  carvedilol 3.125 milliGRAM(s) Oral every 12 hours  cefTRIAXone   IVPB 1000 milliGRAM(s) IV Intermittent every 24 hours  pantoprazole    Tablet 40 milliGRAM(s) Oral before breakfast  sodium chloride 0.9%. 1000 milliLiter(s) (100 mL/Hr) IV Continuous <Continuous>      PAST MEDICAL & SURGICAL HISTORY:  Essential hypertension      Breast cancer      At risk for infection due to chemotherapy      Carcinoma of breast treated with adjuvant chemotherapy          FAMILY HISTORY:      SOCIAL HISTORY:  Denies toxic substance use     VITALS:  T(F): 98.2 (03-19-24 @ 10:10), Max: 98.8 (03-18-24 @ 18:23)  HR: 84 (03-19-24 @ 10:10)  BP: 134/84 (03-19-24 @ 10:10)  RR: 20 (03-19-24 @ 10:10)  SpO2: 96% (03-19-24 @ 10:10)  Wt(kg): --        PHYSICAL EXAM:  Gen: NAD, calm  HEENT: MMM  Neck: no JVD  Cards: RRR, +S1/S2, no M/G/R  Resp: CTA B/L  GI: soft, NT/ND, NABS  : no CVA tenderness  Vascular: no LE edema B/L  Derm: no rashes  Neuro: non-focal    LABS:  03-19    134<L>  |  105  |  21  ----------------------------<  64<L>  2.9<LL>   |  16<L>  |  0.44<L>    Ca    6.8<L>      19 Mar 2024 06:00  Phos  3.5     03-18  Mg     1.20     03-19    TPro  4.6<L>  /  Alb  2.3<L>  /  TBili  1.1  /  DBili      /  AST  22  /  ALT  9   /  AlkPhos  51  03-19    Creatinine Trend: 0.44 <--, 0.72 <--                        7.4    1.28  )-----------( 112      ( 19 Mar 2024 06:00 )             21.9     Urine Studies:  Urinalysis Basic - ( 19 Mar 2024 06:00 )    Color:  / Appearance:  / SG:  / pH:   Gluc: 64 mg/dL / Ketone:   / Bili:  / Urobili:    Blood:  / Protein:  / Nitrite:    Leuk Esterase:  / RBC:  / WBC    Sq Epi:  / Non Sq Epi:  / Bacteria:           RADIOLOGY & ADDITIONAL STUDIES:    < from: US Abdomen Upper Quadrant Right (02.13.24 @ 09:27) >  IMPRESSION:    1. The gallbladder is unremarkable without stones. No biliary dilatation.  2. Evaluation of the liver is technically limited on this examination..   The liver parenchyma demonstrates heterogeneous echotexture with   scattered ill-defined regions of increased echogenicity, possibly   reflecting hepatic steatosis. No discrete focal abnormality is   identified. Further assessment with abdominal CT or MRI is suggested in   this patient with history of breast cancer, as clinically warranted..  3. Limited visualization of the pancreas on this exam.          --- End of Report ---    < end of copied text >

## 2024-03-19 NOTE — CONSULT NOTE ADULT - ASSESSMENT
73y Female with history of breast CA presents with diarrhea. Nephrology consulted for hypokalemia.    1) Hypokalemia: Possibly due to hypomagnesemia and diarrhea. Repleted this morning. Will change IVF to LR with KCL 20 meQ/L. Check urine potassium/creatinine r/o renal potassium wasting. Monitor serum potassium.    2) Hypomagnesemia: Due to diarrhea? Repleted. Repeat in AM. Will need to consider discharging on slow Mg if recurrent. Monitor serum magnesium.    3) Hypocalcemia: Due to diluted sample? Corrected calcium appropriate. Repeat in AM with ionized calcium, iPTH and vitamin D 25-OH level. Monitor serum calcium.    4) Metabolic acidosis: Due to hemolyzed sample? versus NAGMA in setting of diarrhea. LR as above. Repeat with blood gas in AM. Monitor pH.    5) TATY: likely due to hypovolemia. Scr improving with IVF. Defer further work up.      [facility] NEPHROLOGY  Buddy Caldwell M.D.  Iban Navas D.O.  Shalonda Sosa M.D.  MD Leti Jain, MSN, ANP-C    Telephone: [phone]  Facsimile: [phone]    [facility] [place] [address], [address]  [place] [address]

## 2024-03-20 LAB
24R-OH-CALCIDIOL SERPL-MCNC: 30.6 NG/ML — SIGNIFICANT CHANGE UP (ref 30–80)
ANION GAP SERPL CALC-SCNC: 14 MMOL/L — SIGNIFICANT CHANGE UP (ref 7–14)
BASE EXCESS BLDV CALC-SCNC: -3 MMOL/L — LOW (ref -2–3)
BASOPHILS # BLD AUTO: 0.01 K/UL — SIGNIFICANT CHANGE UP (ref 0–0.2)
BASOPHILS NFR BLD AUTO: 0.5 % — SIGNIFICANT CHANGE UP (ref 0–2)
BUN SERPL-MCNC: 20 MG/DL — SIGNIFICANT CHANGE UP (ref 7–23)
CA-I BLD-SCNC: 1.14 MMOL/L — LOW (ref 1.15–1.29)
CALCIUM SERPL-MCNC: 8.1 MG/DL — LOW (ref 8.4–10.5)
CHLORIDE SERPL-SCNC: 99 MMOL/L — SIGNIFICANT CHANGE UP (ref 98–107)
CO2 BLDV-SCNC: 22.1 MMOL/L — SIGNIFICANT CHANGE UP (ref 22–26)
CO2 SERPL-SCNC: 21 MMOL/L — LOW (ref 22–31)
CREAT SERPL-MCNC: 0.6 MG/DL — SIGNIFICANT CHANGE UP (ref 0.5–1.3)
EGFR: 94 ML/MIN/1.73M2 — SIGNIFICANT CHANGE UP
EOSINOPHIL # BLD AUTO: 0.02 K/UL — SIGNIFICANT CHANGE UP (ref 0–0.5)
EOSINOPHIL NFR BLD AUTO: 1.1 % — SIGNIFICANT CHANGE UP (ref 0–6)
FERRITIN SERPL-MCNC: 3020 NG/ML — HIGH (ref 13–330)
FOLATE SERPL-MCNC: 16.4 NG/ML — SIGNIFICANT CHANGE UP (ref 3.1–17.5)
GAS PNL BLDV: SIGNIFICANT CHANGE UP
GLUCOSE SERPL-MCNC: 79 MG/DL — SIGNIFICANT CHANGE UP (ref 70–99)
HAPTOGLOB SERPL-MCNC: 326 MG/DL — HIGH (ref 34–200)
HCO3 BLDV-SCNC: 21 MMOL/L — LOW (ref 22–29)
HCT VFR BLD CALC: 25.4 % — LOW (ref 34.5–45)
HGB BLD-MCNC: 8.6 G/DL — LOW (ref 11.5–15.5)
IANC: 1.49 K/UL — LOW (ref 1.8–7.4)
IMM GRANULOCYTES NFR BLD AUTO: 1.1 % — HIGH (ref 0–0.9)
IRON SATN MFR SERPL: 14 % — SIGNIFICANT CHANGE UP (ref 14–50)
IRON SATN MFR SERPL: 24 UG/DL — LOW (ref 30–160)
LDH SERPL L TO P-CCNC: 104 U/L — LOW (ref 135–225)
LYMPHOCYTES # BLD AUTO: 0.17 K/UL — LOW (ref 1–3.3)
LYMPHOCYTES # BLD AUTO: 9.2 % — LOW (ref 13–44)
MAGNESIUM SERPL-MCNC: 1.6 MG/DL — SIGNIFICANT CHANGE UP (ref 1.6–2.6)
MCHC RBC-ENTMCNC: 30.6 PG — SIGNIFICANT CHANGE UP (ref 27–34)
MCHC RBC-ENTMCNC: 33.9 GM/DL — SIGNIFICANT CHANGE UP (ref 32–36)
MCV RBC AUTO: 90.4 FL — SIGNIFICANT CHANGE UP (ref 80–100)
MONOCYTES # BLD AUTO: 0.14 K/UL — SIGNIFICANT CHANGE UP (ref 0–0.9)
MONOCYTES NFR BLD AUTO: 7.6 % — SIGNIFICANT CHANGE UP (ref 2–14)
NEUTROPHILS # BLD AUTO: 1.49 K/UL — LOW (ref 1.8–7.4)
NEUTROPHILS NFR BLD AUTO: 80.5 % — HIGH (ref 43–77)
NRBC # BLD: 0 /100 WBCS — SIGNIFICANT CHANGE UP (ref 0–0)
NRBC # FLD: 0 K/UL — SIGNIFICANT CHANGE UP (ref 0–0)
PCO2 BLDV: 34 MMHG — LOW (ref 39–52)
PH BLDV: 7.4 — SIGNIFICANT CHANGE UP (ref 7.32–7.43)
PHOSPHATE SERPL-MCNC: 2.5 MG/DL — SIGNIFICANT CHANGE UP (ref 2.5–4.5)
PLATELET # BLD AUTO: 159 K/UL — SIGNIFICANT CHANGE UP (ref 150–400)
PO2 BLDV: 60 MMHG — HIGH (ref 25–45)
POTASSIUM SERPL-MCNC: 3.7 MMOL/L — SIGNIFICANT CHANGE UP (ref 3.5–5.3)
POTASSIUM SERPL-SCNC: 3.7 MMOL/L — SIGNIFICANT CHANGE UP (ref 3.5–5.3)
PTH-INTACT FLD-MCNC: 25 PG/ML — SIGNIFICANT CHANGE UP (ref 15–65)
RBC # BLD: 2.81 M/UL — LOW (ref 3.8–5.2)
RBC # FLD: 16.5 % — HIGH (ref 10.3–14.5)
SAO2 % BLDV: 91.2 % — HIGH (ref 67–88)
SODIUM SERPL-SCNC: 134 MMOL/L — LOW (ref 135–145)
TIBC SERPL-MCNC: 172 UG/DL — LOW (ref 220–430)
UIBC SERPL-MCNC: 148 UG/DL — SIGNIFICANT CHANGE UP (ref 110–370)
VIT B12 SERPL-MCNC: 487 PG/ML — SIGNIFICANT CHANGE UP (ref 200–900)
WBC # BLD: 1.85 K/UL — LOW (ref 3.8–10.5)
WBC # FLD AUTO: 1.85 K/UL — LOW (ref 3.8–10.5)

## 2024-03-20 RX ORDER — MAGNESIUM SULFATE 500 MG/ML
2 VIAL (ML) INJECTION ONCE
Refills: 0 | Status: COMPLETED | OUTPATIENT
Start: 2024-03-20 | End: 2024-03-20

## 2024-03-20 RX ORDER — PHENAZOPYRIDINE HCL 100 MG
100 TABLET ORAL EVERY 8 HOURS
Refills: 0 | Status: COMPLETED | OUTPATIENT
Start: 2024-03-20 | End: 2024-03-21

## 2024-03-20 RX ORDER — POTASSIUM CHLORIDE 20 MEQ
20 PACKET (EA) ORAL ONCE
Refills: 0 | Status: COMPLETED | OUTPATIENT
Start: 2024-03-20 | End: 2024-03-20

## 2024-03-20 RX ADMIN — Medication 100 MILLIGRAM(S): at 06:30

## 2024-03-20 RX ADMIN — SODIUM CHLORIDE 50 MILLILITER(S): 9 INJECTION, SOLUTION INTRAVENOUS at 23:13

## 2024-03-20 RX ADMIN — SODIUM CHLORIDE 50 MILLILITER(S): 9 INJECTION, SOLUTION INTRAVENOUS at 09:45

## 2024-03-20 RX ADMIN — Medication 100 MILLIGRAM(S): at 13:05

## 2024-03-20 RX ADMIN — Medication 20 MILLIEQUIVALENT(S): at 18:31

## 2024-03-20 RX ADMIN — CEFTRIAXONE 100 MILLIGRAM(S): 500 INJECTION, POWDER, FOR SOLUTION INTRAMUSCULAR; INTRAVENOUS at 06:27

## 2024-03-20 RX ADMIN — Medication 100 MILLIGRAM(S): at 23:13

## 2024-03-20 RX ADMIN — CARVEDILOL PHOSPHATE 3.12 MILLIGRAM(S): 80 CAPSULE, EXTENDED RELEASE ORAL at 06:30

## 2024-03-20 RX ADMIN — Medication 25 GRAM(S): at 18:30

## 2024-03-20 RX ADMIN — PANTOPRAZOLE SODIUM 40 MILLIGRAM(S): 20 TABLET, DELAYED RELEASE ORAL at 06:30

## 2024-03-20 RX ADMIN — CARVEDILOL PHOSPHATE 3.12 MILLIGRAM(S): 80 CAPSULE, EXTENDED RELEASE ORAL at 18:30

## 2024-03-20 NOTE — PROGRESS NOTE ADULT - SUBJECTIVE AND OBJECTIVE BOX
Patient is a 73y old  Female who presents with a chief complaint of anemia, generalized weakness, (19 Mar 2024 13:27)    Pt seen this morning, with her spouse at bedside. She reports feeling better today compared to yesterday, though still complains of dysuria. No diarrhea for the past several days.    MEDICATIONS  (STANDING):  carvedilol 3.125 milliGRAM(s) Oral every 12 hours  cefTRIAXone   IVPB 1000 milliGRAM(s) IV Intermittent every 24 hours  lactated ringers 1000 milliLiter(s) (50 mL/Hr) IV Continuous <Continuous>  pantoprazole    Tablet 40 milliGRAM(s) Oral before breakfast  phenazopyridine 100 milliGRAM(s) Oral every 8 hours    MEDICATIONS  (PRN):        Vital Signs Last 24 Hrs  T(C): 36.6 (20 Mar 2024 10:41), Max: 37.1 (19 Mar 2024 17:25)  T(F): 97.9 (20 Mar 2024 10:41), Max: 98.7 (19 Mar 2024 17:25)  HR: 94 (20 Mar 2024 10:41) (81 - 94)  BP: 119/79 (20 Mar 2024 10:41) (119/79 - 145/81)  BP(mean): --  RR: 18 (20 Mar 2024 10:41) (16 - 20)  SpO2: 99% (20 Mar 2024 10:41) (97% - 100%)    Parameters below as of 20 Mar 2024 10:41  Patient On (Oxygen Delivery Method): room air        PE  NAD  Awake, alert  Anicteric, MMM  No c/c/e  No rash grossly                            8.6    1.85  )-----------( 159      ( 20 Mar 2024 06:20 )             25.4       03-20    134<L>  |  99  |  20  ----------------------------<  79  3.7   |  21<L>  |  0.60    Ca    8.1<L>      20 Mar 2024 06:20  Phos  2.5     03-20  Mg     1.60     03-20    TPro  4.6<L>  /  Alb  2.3<L>  /  TBili  1.1  /  DBili  x   /  AST  22  /  ALT  9   /  AlkPhos  51  03-19

## 2024-03-20 NOTE — PROGRESS NOTE ADULT - SUBJECTIVE AND OBJECTIVE BOX
Name of Patient : YOSEF CHILD  MRN: [id number]  Date of visit: 03-20-24       Subjective: Patient seen and examined. No new events except as noted.   doing okay  hgb stable     REVIEW OF SYSTEMS:    CONSTITUTIONAL: No weakness, fevers or chills  EYES/ENT: No visual changes;  No vertigo or throat pain   NECK: No pain or stiffness  RESPIRATORY: No cough, wheezing, hemoptysis; No shortness of breath  CARDIOVASCULAR: No chest pain or palpitations  GASTROINTESTINAL: No abdominal or epigastric pain. No nausea, vomiting, or hematemesis; No diarrhea or constipation. No melena or hematochezia.  GENITOURINARY: No dysuria, frequency or hematuria  NEUROLOGICAL: No numbness or weakness  SKIN: No itching, burning, rashes, or lesions   All other review of systems is negative unless indicated above.    MEDICATIONS:  MEDICATIONS  (STANDING):  carvedilol 3.125 milliGRAM(s) Oral every 12 hours  cefTRIAXone   IVPB 1000 milliGRAM(s) IV Intermittent every 24 hours  lactated ringers 1000 milliLiter(s) (50 mL/Hr) IV Continuous <Continuous>  pantoprazole    Tablet 40 milliGRAM(s) Oral before breakfast  phenazopyridine 100 milliGRAM(s) Oral every 8 hours      PHYSICAL EXAM:  T(C): 36.6 (03-20-24 @ 18:01), Max: 36.7 (03-20-24 @ 06:24)  HR: 98 (03-20-24 @ 18:01) (88 - 98)  BP: 134/89 (03-20-24 @ 18:01) (119/79 - 143/85)  RR: 17 (03-20-24 @ 18:01) (17 - 18)  SpO2: 96% (03-20-24 @ 18:01) (96% - 99%)  Wt(kg): --  I&O's Summary        Appearance: Normal	  HEENT:  PERRLA   Lymphatic: No lymphadenopathy   Cardiovascular: Normal S1 S2, no JVD  Respiratory: normal effort , clear  Gastrointestinal:  Soft, Non-tender  Skin: No rashes,  warm to touch  Psychiatry:  Mood & affect appropriate  Musculuskeletal: No edema    recent labs, Imaging and EKGs personally reviewed                           8.6    1.85  )-----------( 159      ( 20 Mar 2024 06:20 )             25.4               03-20    134<L>  |  99  |  20  ----------------------------<  79  3.7   |  21<L>  |  0.60    Ca    8.1<L>      20 Mar 2024 06:20  Phos  2.5     03-20  Mg     1.60     03-20    TPro  4.6<L>  /  Alb  2.3<L>  /  TBili  1.1  /  DBili  x   /  AST  22  /  ALT  9   /  AlkPhos  51  03-19                       Urinalysis Basic - ( 20 Mar 2024 06:20 )    Color: x / Appearance: x / SG: x / pH: x  Gluc: 79 mg/dL / Ketone: x  / Bili: x / Urobili: x   Blood: x / Protein: x / Nitrite: x   Leuk Esterase: x / RBC: x / WBC x   Sq Epi: x / Non Sq Epi: x / Bacteria: x

## 2024-03-20 NOTE — PROGRESS NOTE ADULT - SUBJECTIVE AND OBJECTIVE BOX
[facility] NEPHROLOGY- PROGRESS NOTE    73y Female with history of breast CA presents with diarrhea. Nephrology consulted for hypokalemia.    REVIEW OF SYSTEMS:  Gen: no fevers  Cards: no chest pain  Resp: no dyspnea  GI: no nausea or vomiting or diarrhea, + ab pain  Vascular: no LE edema    No Known Allergies      Hospital Medications: Medications reviewed    VITALS:  T(F): 97.9 (03-20-24 @ 14:39), Max: 98.7 (03-19-24 @ 17:25)  HR: 96 (03-20-24 @ 14:39)  BP: 128/96 (03-20-24 @ 14:39)  RR: 17 (03-20-24 @ 14:39)  SpO2: 96% (03-20-24 @ 14:39)  Wt(kg): --  Height (cm): 160 (03-19 @ 18:20)  Weight (kg): 46.3 (03-19 @ 18:20)  BMI (kg/m2): 18.1 (03-19 @ 18:20)  BSA (m2): 1.45 (03-19 @ 18:20)      PHYSICAL EXAM:    Gen: NAD, calm  Cards: RRR, +S1/S2, no M/G/R  Resp: CTA B/L  GI: soft, NT/ND, NABS  Vascular: no LE edema B/L    LABS:  03-20    134<L>  |  99  |  20  ----------------------------<  79  3.7   |  21<L>  |  0.60    Ca    8.1<L>      20 Mar 2024 06:20  Phos  2.5     03-20  Mg     1.60     03-20    TPro  4.6<L>  /  Alb  2.3<L>  /  TBili  1.1  /  DBili      /  AST  22  /  ALT  9   /  AlkPhos  51  03-19    Creatinine Trend: 0.60 <--, 0.44 <--, 0.72 <--                        8.6    1.85  )-----------( 159      ( 20 Mar 2024 06:20 )             25.4     Urine Studies:  Urinalysis Basic - ( 20 Mar 2024 06:20 )    Color:  / Appearance:  / SG:  / pH:   Gluc: 79 mg/dL / Ketone:   / Bili:  / Urobili:    Blood:  / Protein:  / Nitrite:    Leuk Esterase:  / RBC:  / WBC    Sq Epi:  / Non Sq Epi:  / Bacteria:       Potassium, Random Urine: 27.6 mmol/L (03-19 @ 13:40)  Creatinine, Random Urine: 34 mg/dL (03-19 @ 13:40)      RADIOLOGY & ADDITIONAL STUDIES:

## 2024-03-21 LAB
ANION GAP SERPL CALC-SCNC: 11 MMOL/L — SIGNIFICANT CHANGE UP (ref 7–14)
ANION GAP SERPL CALC-SCNC: 20 MMOL/L — HIGH (ref 7–14)
BASOPHILS # BLD AUTO: 0.01 K/UL — SIGNIFICANT CHANGE UP (ref 0–0.2)
BASOPHILS NFR BLD AUTO: 0.7 % — SIGNIFICANT CHANGE UP (ref 0–2)
BUN SERPL-MCNC: 17 MG/DL — SIGNIFICANT CHANGE UP (ref 7–23)
BUN SERPL-MCNC: 18 MG/DL — SIGNIFICANT CHANGE UP (ref 7–23)
CALCIUM SERPL-MCNC: 6.5 MG/DL — CRITICAL LOW (ref 8.4–10.5)
CALCIUM SERPL-MCNC: 8.1 MG/DL — LOW (ref 8.4–10.5)
CHLORIDE SERPL-SCNC: 101 MMOL/L — SIGNIFICANT CHANGE UP (ref 98–107)
CHLORIDE SERPL-SCNC: 84 MMOL/L — LOW (ref 98–107)
CO2 SERPL-SCNC: 17 MMOL/L — LOW (ref 22–31)
CO2 SERPL-SCNC: 22 MMOL/L — SIGNIFICANT CHANGE UP (ref 22–31)
CREAT SERPL-MCNC: 0.45 MG/DL — LOW (ref 0.5–1.3)
CREAT SERPL-MCNC: 0.55 MG/DL — SIGNIFICANT CHANGE UP (ref 0.5–1.3)
EGFR: 100 ML/MIN/1.73M2 — SIGNIFICANT CHANGE UP
EGFR: 96 ML/MIN/1.73M2 — SIGNIFICANT CHANGE UP
EOSINOPHIL # BLD AUTO: 0.01 K/UL — SIGNIFICANT CHANGE UP (ref 0–0.5)
EOSINOPHIL NFR BLD AUTO: 0.7 % — SIGNIFICANT CHANGE UP (ref 0–6)
GLUCOSE SERPL-MCNC: 109 MG/DL — HIGH (ref 70–99)
GLUCOSE SERPL-MCNC: 591 MG/DL — CRITICAL HIGH (ref 70–99)
HCT VFR BLD CALC: 19.4 % — CRITICAL LOW (ref 34.5–45)
HCT VFR BLD CALC: 25.5 % — LOW (ref 34.5–45)
HGB BLD-MCNC: 6.5 G/DL — CRITICAL LOW (ref 11.5–15.5)
HGB BLD-MCNC: 8.7 G/DL — LOW (ref 11.5–15.5)
IANC: 1.05 K/UL — LOW (ref 1.8–7.4)
IMM GRANULOCYTES NFR BLD AUTO: 1.4 % — HIGH (ref 0–0.9)
LYMPHOCYTES # BLD AUTO: 0.2 K/UL — LOW (ref 1–3.3)
LYMPHOCYTES # BLD AUTO: 14.1 % — SIGNIFICANT CHANGE UP (ref 13–44)
MAGNESIUM SERPL-MCNC: 1.5 MG/DL — LOW (ref 1.6–2.6)
MCHC RBC-ENTMCNC: 31.1 PG — SIGNIFICANT CHANGE UP (ref 27–34)
MCHC RBC-ENTMCNC: 31.5 PG — SIGNIFICANT CHANGE UP (ref 27–34)
MCHC RBC-ENTMCNC: 33.5 GM/DL — SIGNIFICANT CHANGE UP (ref 32–36)
MCHC RBC-ENTMCNC: 34.1 GM/DL — SIGNIFICANT CHANGE UP (ref 32–36)
MCV RBC AUTO: 92.4 FL — SIGNIFICANT CHANGE UP (ref 80–100)
MCV RBC AUTO: 92.8 FL — SIGNIFICANT CHANGE UP (ref 80–100)
MONOCYTES # BLD AUTO: 0.13 K/UL — SIGNIFICANT CHANGE UP (ref 0–0.9)
MONOCYTES NFR BLD AUTO: 9.2 % — SIGNIFICANT CHANGE UP (ref 2–14)
NEUTROPHILS # BLD AUTO: 1.05 K/UL — LOW (ref 1.8–7.4)
NEUTROPHILS NFR BLD AUTO: 73.9 % — SIGNIFICANT CHANGE UP (ref 43–77)
NRBC # BLD: 0 /100 WBCS — SIGNIFICANT CHANGE UP (ref 0–0)
NRBC # BLD: 0 /100 WBCS — SIGNIFICANT CHANGE UP (ref 0–0)
NRBC # FLD: 0 K/UL — SIGNIFICANT CHANGE UP (ref 0–0)
NRBC # FLD: 0 K/UL — SIGNIFICANT CHANGE UP (ref 0–0)
PHOSPHATE SERPL-MCNC: 3.5 MG/DL — SIGNIFICANT CHANGE UP (ref 2.5–4.5)
PLATELET # BLD AUTO: 135 K/UL — LOW (ref 150–400)
PLATELET # BLD AUTO: 166 K/UL — SIGNIFICANT CHANGE UP (ref 150–400)
POTASSIUM SERPL-MCNC: 3.6 MMOL/L — SIGNIFICANT CHANGE UP (ref 3.5–5.3)
POTASSIUM SERPL-MCNC: 4.1 MMOL/L — SIGNIFICANT CHANGE UP (ref 3.5–5.3)
POTASSIUM SERPL-SCNC: 3.6 MMOL/L — SIGNIFICANT CHANGE UP (ref 3.5–5.3)
POTASSIUM SERPL-SCNC: 4.1 MMOL/L — SIGNIFICANT CHANGE UP (ref 3.5–5.3)
RBC # BLD: 2.09 M/UL — LOW (ref 3.8–5.2)
RBC # BLD: 2.76 M/UL — LOW (ref 3.8–5.2)
RBC # FLD: 16.9 % — HIGH (ref 10.3–14.5)
RBC # FLD: 17 % — HIGH (ref 10.3–14.5)
SODIUM SERPL-SCNC: 121 MMOL/L — LOW (ref 135–145)
SODIUM SERPL-SCNC: 134 MMOL/L — LOW (ref 135–145)
WBC # BLD: 1.42 K/UL — LOW (ref 3.8–10.5)
WBC # BLD: 1.9 K/UL — LOW (ref 3.8–10.5)
WBC # FLD AUTO: 1.42 K/UL — LOW (ref 3.8–10.5)
WBC # FLD AUTO: 1.9 K/UL — LOW (ref 3.8–10.5)

## 2024-03-21 RX ORDER — SODIUM CHLORIDE 9 MG/ML
1000 INJECTION INTRAMUSCULAR; INTRAVENOUS; SUBCUTANEOUS
Refills: 0 | Status: DISCONTINUED | OUTPATIENT
Start: 2024-03-21 | End: 2024-03-22

## 2024-03-21 RX ADMIN — Medication 100 MILLIGRAM(S): at 22:29

## 2024-03-21 RX ADMIN — CARVEDILOL PHOSPHATE 3.12 MILLIGRAM(S): 80 CAPSULE, EXTENDED RELEASE ORAL at 05:29

## 2024-03-21 RX ADMIN — PANTOPRAZOLE SODIUM 40 MILLIGRAM(S): 20 TABLET, DELAYED RELEASE ORAL at 05:27

## 2024-03-21 RX ADMIN — Medication 100 MILLIGRAM(S): at 13:30

## 2024-03-21 RX ADMIN — CARVEDILOL PHOSPHATE 3.12 MILLIGRAM(S): 80 CAPSULE, EXTENDED RELEASE ORAL at 17:47

## 2024-03-21 RX ADMIN — SODIUM CHLORIDE 75 MILLILITER(S): 9 INJECTION INTRAMUSCULAR; INTRAVENOUS; SUBCUTANEOUS at 12:51

## 2024-03-21 RX ADMIN — CEFTRIAXONE 100 MILLIGRAM(S): 500 INJECTION, POWDER, FOR SOLUTION INTRAMUSCULAR; INTRAVENOUS at 05:26

## 2024-03-21 RX ADMIN — Medication 100 MILLIGRAM(S): at 05:27

## 2024-03-21 RX ADMIN — SODIUM CHLORIDE 75 MILLILITER(S): 9 INJECTION INTRAMUSCULAR; INTRAVENOUS; SUBCUTANEOUS at 23:37

## 2024-03-21 NOTE — PROGRESS NOTE ADULT - SUBJECTIVE AND OBJECTIVE BOX
Name of Patient : YOSEF CHILD  MRN: [id number]  Date of visit: 03-21-24 @ 15:40      Subjective: Patient seen and examined. No new events except as noted.   doing okay   hgb stable       REVIEW OF SYSTEMS:    CONSTITUTIONAL: + weakness  EYES/ENT: No visual changes;  No vertigo or throat pain   NECK: No pain or stiffness  RESPIRATORY: No cough, wheezing, hemoptysis; No shortness of breath  CARDIOVASCULAR: No chest pain or palpitations  GASTROINTESTINAL: No abdominal or epigastric pain. No nausea, vomiting, or hematemesis; No diarrhea or constipation. No melena or hematochezia.  GENITOURINARY: No dysuria, frequency or hematuria  NEUROLOGICAL: No numbness or weakness  SKIN: No itching, burning, rashes, or lesions   All other review of systems is negative unless indicated above.    MEDICATIONS:  MEDICATIONS  (STANDING):  carvedilol 3.125 milliGRAM(s) Oral every 12 hours  pantoprazole    Tablet 40 milliGRAM(s) Oral before breakfast  phenazopyridine 100 milliGRAM(s) Oral every 8 hours  sodium chloride 0.9%. 1000 milliLiter(s) (75 mL/Hr) IV Continuous <Continuous>      PHYSICAL EXAM:  T(C): 36.8 (03-21-24 @ 13:46), Max: 36.9 (03-20-24 @ 22:56)  HR: 93 (03-21-24 @ 13:46) (88 - 98)  BP: 139/85 (03-21-24 @ 13:46) (126/90 - 139/85)  RR: 17 (03-21-24 @ 13:46) (17 - 18)  SpO2: 95% (03-21-24 @ 13:46) (93% - 97%)  Wt(kg): --  I&O's Summary        Appearance: Normal	  HEENT:  PERRLA   Lymphatic: No lymphadenopathy   Cardiovascular: Normal S1 S2, no JVD  Respiratory: normal effort , clear  Gastrointestinal:  Soft, Non-tender  Skin: No rashes,  warm to touch  Psychiatry:  Mood & affect appropriate  Musculuskeletal: No edema    recent labs, Imaging and EKGs personally reviewed                           8.7    1.90  )-----------( 166      ( 21 Mar 2024 10:07 )             25.5               03-21    134<L>  |  101  |  18  ----------------------------<  109<H>  4.1   |  22  |  0.55    Ca    8.1<L>      21 Mar 2024 10:01  Phos  3.5     03-21  Mg     1.50     03-21                         Urinalysis Basic - ( 21 Mar 2024 10:01 )    Color: x / Appearance: x / SG: x / pH: x  Gluc: 109 mg/dL / Ketone: x  / Bili: x / Urobili: x   Blood: x / Protein: x / Nitrite: x   Leuk Esterase: x / RBC: x / WBC x   Sq Epi: x / Non Sq Epi: x / Bacteria: x

## 2024-03-21 NOTE — DIETITIAN INITIAL EVALUATION ADULT - NS FNS WEIGHT CHANGE REASON
Pt reported UBW 130lbs ~6 months ago. Pt's current adm weight 102.1lbs. Weight trend reflects significant weight loss of 18lbs/13.84% for 6 months./unintentional

## 2024-03-21 NOTE — PROGRESS NOTE ADULT - SUBJECTIVE AND OBJECTIVE BOX
[facility] NEPHROLOGY- PROGRESS NOTE    73y Female with history of breast CA presents with diarrhea. Nephrology consulted for hypokalemia.    REVIEW OF SYSTEMS:  Gen: no fevers  Cards: no chest pain  Resp: no dyspnea  GI: no nausea or vomiting or diarrhea, + ab pain  Vascular: no LE edema    No Known Allergies      Hospital Medications: Medications reviewed      VITALS:  T(F): 98.2 (03-21-24 @ 05:36), Max: 98.5 (03-20-24 @ 22:56)  HR: 91 (03-21-24 @ 05:36)  BP: 136/87 (03-21-24 @ 05:36)  RR: 17 (03-21-24 @ 05:36)  SpO2: 96% (03-21-24 @ 05:36)  Wt(kg): --        PHYSICAL EXAM:    Gen: NAD, calm  Cards: RRR, +S1/S2, no M/G/R  Resp: CTA B/L  GI: soft, NT/ND, NABS  Vascular: no LE edema B/L        LABS:  03-21    121<L>  |  84<L>  |  17  ----------------------------<  591<HH>  3.6   |  17<L>  |  0.45<L>    Ca    6.5<LL>      21 Mar 2024 06:00  Phos  3.5     03-21  Mg     1.50     03-21      Creatinine Trend: 0.45 <--, 0.60 <--, 0.44 <--, 0.72 <--                        6.5    1.42  )-----------( 135      ( 21 Mar 2024 06:00 )             19.4     Urine Studies:  Urinalysis Basic - ( 21 Mar 2024 06:00 )    Color:  / Appearance:  / SG:  / pH:   Gluc: 591 mg/dL / Ketone:   / Bili:  / Urobili:    Blood:  / Protein:  / Nitrite:    Leuk Esterase:  / RBC:  / WBC    Sq Epi:  / Non Sq Epi:  / Bacteria:       Potassium, Random Urine: 27.6 mmol/L (03-19 @ 13:40)  Creatinine, Random Urine: 34 mg/dL (03-19 @ 13:40)

## 2024-03-21 NOTE — DIETITIAN INITIAL EVALUATION ADULT - PROBLEM SELECTOR PLAN 2
- isolation  -cdiff /gi pcr studies ordered here. Alternatively, can contact outpt  [facility] to follow results of stool specimens that were sent there

## 2024-03-21 NOTE — DIETITIAN INITIAL EVALUATION ADULT - ORAL INTAKE PTA/DIET HISTORY
Patient reports her appetite has slightly decreased due to her infection recently, however pt didn't elaborate more. Pt states her appetite always good in general, able to consume 3 meals daily. Pt endorses significant weight loss over the past 1 yr. Reported UBW 130lbs. Pt endorses drinking Ensure Plus HP 1-2 x daily.  Patient reports her appetite has slightly decreased due to her infection recently, however pt didn't elaborate more. Pt states her appetite always good in general, able to consume 3 meals daily. Pt endorses significant weight loss over the past 1 yr. Reported her previous weight was 130lbs appropriately 6 months ago. Pt endorses drinking Ensure Plus HP 1-2 x daily.

## 2024-03-21 NOTE — DIETITIAN INITIAL EVALUATION ADULT - PERTINENT LABORATORY DATA
03-21    134<L>  |  101  |  18  ----------------------------<  109<H>  4.1   |  22  |  0.55    Ca    8.1<L>      21 Mar 2024 10:01  Phos  3.5     03-21  Mg     1.50     03-21    A1C with Estimated Average Glucose Result: 5.6 % (02-13-24 @ 06:35)

## 2024-03-21 NOTE — DIETITIAN INITIAL EVALUATION ADULT - PERTINENT MEDS FT
MEDICATIONS  (STANDING):  carvedilol 3.125 milliGRAM(s) Oral every 12 hours  pantoprazole    Tablet 40 milliGRAM(s) Oral before breakfast  phenazopyridine 100 milliGRAM(s) Oral every 8 hours  sodium chloride 0.9%. 1000 milliLiter(s) (75 mL/Hr) IV Continuous <Continuous>    MEDICATIONS  (PRN):

## 2024-03-21 NOTE — DIETITIAN INITIAL EVALUATION ADULT - OTHER INFO
Per chart review, 73y Female with history of breast CA presents with diarrhea. Nephrology consulted for hypokalemia.    Patient seen at bedside, appears lethargic. Reports her appetite has improved in hospital, able to consume % of her meals now. Pt's diet supplemented with Ensure Plus HP 1x daily (350cal, 20gm pro each), pt reported 100% completion. Pt is also amenable to receive Mighty Shake 1x daily (220kcal, 6gm pro per each serving). Denies chewing and swallowing difficulties. Denies any GI distress (nausea/vomiting/diarrhea/constipation.) LBM today. Pt adm with diarrhea, now improving. Labs significant for Na 121L, Mg 1.5L,Ca 6.5. ordered mg and potassium chloride for repletion.  Per chart review, 73y Female with history of breast CA presents with diarrhea. Nephrology consulted for hypokalemia.    Patient seen at bedside, appears lethargic. Reports her appetite has improved in hospital, able to consume % of her meals now. Pt's diet supplemented with Ensure Plus HP 1x daily (350cal, 20gm pro each), pt reported 100% completion. Pt is also amenable to receive Mighty Shake 1x daily (220kcal, 6gm pro per each serving). Denies chewing and swallowing difficulties. Denies any GI distress (nausea/vomiting/diarrhea/constipation.) LBM today. Pt adm with diarrhea, now improving. Labs significant for Na 121L, Mg 1.5L,Ca 6.5. ordered mg sulfate and potassium chloride for repletion.   Discussed importance of increasing calorie and protein rich food choices at each meal. Encouraged patient to consume small frequent meals to increase calorie malnutrition. Provided patient with High Calorie, High Protein Nutrition Therapy handout. RD to remain available for further nutritional interventions as indicated.

## 2024-03-21 NOTE — PROGRESS NOTE ADULT - SUBJECTIVE AND OBJECTIVE BOX
Patient is a 73y old  Female who presents with a chief complaint of anemia, generalized weakness, (21 Mar 2024 09:42)    Pt seen this morning, with her spouse at bedside. She feels overall better, still has some dysuria but states it is improving.     MEDICATIONS  (STANDING):  carvedilol 3.125 milliGRAM(s) Oral every 12 hours  pantoprazole    Tablet 40 milliGRAM(s) Oral before breakfast  phenazopyridine 100 milliGRAM(s) Oral every 8 hours  sodium chloride 0.9%. 1000 milliLiter(s) (75 mL/Hr) IV Continuous <Continuous>    MEDICATIONS  (PRN):        Vital Signs Last 24 Hrs  T(C): 36.8 (21 Mar 2024 10:48), Max: 36.9 (20 Mar 2024 22:56)  T(F): 98.2 (21 Mar 2024 10:48), Max: 98.5 (20 Mar 2024 22:56)  HR: 88 (21 Mar 2024 10:48) (88 - 98)  BP: 126/90 (21 Mar 2024 10:48) (126/90 - 137/91)  BP(mean): --  RR: 18 (21 Mar 2024 10:48) (17 - 18)  SpO2: 93% (21 Mar 2024 10:48) (93% - 97%)    Parameters below as of 21 Mar 2024 10:48  Patient On (Oxygen Delivery Method): room air        PE  NAD  Awake, alert  Anicteric, MMM  abd soft, mildly ttp  No c/c/e  No rash grossly                            8.7    1.90  )-----------( 166      ( 21 Mar 2024 10:07 )             25.5       03-21    134<L>  |  101  |  18  ----------------------------<  109<H>  4.1   |  22  |  0.55    Ca    8.1<L>      21 Mar 2024 10:01  Phos  3.5     03-21  Mg     1.50     03-21

## 2024-03-21 NOTE — DIETITIAN INITIAL EVALUATION ADULT - ORAL NUTRITION SUPPLEMENTS
Recommend cont. to provide Ensure Plus HP 1x daily (350cal, 20gm pro each) and will provide Mighty Shake x 1 daily (220kcal, 6gm pro per each serving).

## 2024-03-21 NOTE — DIETITIAN INITIAL EVALUATION ADULT - NS FNS DIET ORDER
Diet, Regular:   Liquid Protein Supplement     Qty per Day:  1  Supplement Feeding Modality:  Oral  Ensure Plus High Protein Cans or Servings Per Day:  1       Frequency:  Daily (03-19-24 @ 10:32) [Active]

## 2024-03-22 ENCOUNTER — TRANSCRIPTION ENCOUNTER (OUTPATIENT)
Age: 74
End: 2024-03-22

## 2024-03-22 LAB
ANION GAP SERPL CALC-SCNC: 10 MMOL/L — SIGNIFICANT CHANGE UP (ref 7–14)
ANION GAP SERPL CALC-SCNC: 13 MMOL/L — SIGNIFICANT CHANGE UP (ref 7–14)
APPEARANCE UR: CLEAR — SIGNIFICANT CHANGE UP
BACTERIA # UR AUTO: NEGATIVE /HPF — SIGNIFICANT CHANGE UP
BILIRUB UR-MCNC: NEGATIVE — SIGNIFICANT CHANGE UP
BLD GP AB SCN SERPL QL: NEGATIVE — SIGNIFICANT CHANGE UP
BUN SERPL-MCNC: 12 MG/DL — SIGNIFICANT CHANGE UP (ref 7–23)
BUN SERPL-MCNC: 16 MG/DL — SIGNIFICANT CHANGE UP (ref 7–23)
CALCIUM SERPL-MCNC: 6 MG/DL — CRITICAL LOW (ref 8.4–10.5)
CALCIUM SERPL-MCNC: 8.3 MG/DL — LOW (ref 8.4–10.5)
CAST: 0 /LPF — SIGNIFICANT CHANGE UP (ref 0–4)
CHLORIDE SERPL-SCNC: 100 MMOL/L — SIGNIFICANT CHANGE UP (ref 98–107)
CHLORIDE SERPL-SCNC: 110 MMOL/L — HIGH (ref 98–107)
CO2 SERPL-SCNC: 18 MMOL/L — LOW (ref 22–31)
CO2 SERPL-SCNC: 22 MMOL/L — SIGNIFICANT CHANGE UP (ref 22–31)
COLOR SPEC: YELLOW — SIGNIFICANT CHANGE UP
CREAT SERPL-MCNC: 0.36 MG/DL — LOW (ref 0.5–1.3)
CREAT SERPL-MCNC: 0.51 MG/DL — SIGNIFICANT CHANGE UP (ref 0.5–1.3)
DIFF PNL FLD: ABNORMAL
EGFR: 106 ML/MIN/1.73M2 — SIGNIFICANT CHANGE UP
EGFR: 97 ML/MIN/1.73M2 — SIGNIFICANT CHANGE UP
GLUCOSE SERPL-MCNC: 110 MG/DL — HIGH (ref 70–99)
GLUCOSE SERPL-MCNC: 76 MG/DL — SIGNIFICANT CHANGE UP (ref 70–99)
GLUCOSE UR QL: NEGATIVE MG/DL — SIGNIFICANT CHANGE UP
HCT VFR BLD CALC: 21.7 % — LOW (ref 34.5–45)
HCT VFR BLD CALC: 33.2 % — LOW (ref 34.5–45)
HGB BLD-MCNC: 11.1 G/DL — LOW (ref 11.5–15.5)
HGB BLD-MCNC: 7.2 G/DL — LOW (ref 11.5–15.5)
KETONES UR-MCNC: NEGATIVE MG/DL — SIGNIFICANT CHANGE UP
LEUKOCYTE ESTERASE UR-ACNC: ABNORMAL
MAGNESIUM SERPL-MCNC: 1.2 MG/DL — LOW (ref 1.6–2.6)
MCHC RBC-ENTMCNC: 30.4 PG — SIGNIFICANT CHANGE UP (ref 27–34)
MCHC RBC-ENTMCNC: 31.6 PG — SIGNIFICANT CHANGE UP (ref 27–34)
MCHC RBC-ENTMCNC: 33.2 GM/DL — SIGNIFICANT CHANGE UP (ref 32–36)
MCHC RBC-ENTMCNC: 33.4 GM/DL — SIGNIFICANT CHANGE UP (ref 32–36)
MCV RBC AUTO: 91 FL — SIGNIFICANT CHANGE UP (ref 80–100)
MCV RBC AUTO: 95.2 FL — SIGNIFICANT CHANGE UP (ref 80–100)
NITRITE UR-MCNC: NEGATIVE — SIGNIFICANT CHANGE UP
NRBC # BLD: 0 /100 WBCS — SIGNIFICANT CHANGE UP (ref 0–0)
NRBC # BLD: 0 /100 WBCS — SIGNIFICANT CHANGE UP (ref 0–0)
NRBC # FLD: 0 K/UL — SIGNIFICANT CHANGE UP (ref 0–0)
NRBC # FLD: 0 K/UL — SIGNIFICANT CHANGE UP (ref 0–0)
PH UR: 7 — SIGNIFICANT CHANGE UP (ref 5–8)
PHOSPHATE SERPL-MCNC: 1.7 MG/DL — LOW (ref 2.5–4.5)
PLATELET # BLD AUTO: 150 K/UL — SIGNIFICANT CHANGE UP (ref 150–400)
PLATELET # BLD AUTO: 186 K/UL — SIGNIFICANT CHANGE UP (ref 150–400)
POTASSIUM SERPL-MCNC: 3.2 MMOL/L — LOW (ref 3.5–5.3)
POTASSIUM SERPL-MCNC: 4 MMOL/L — SIGNIFICANT CHANGE UP (ref 3.5–5.3)
POTASSIUM SERPL-SCNC: 3.2 MMOL/L — LOW (ref 3.5–5.3)
POTASSIUM SERPL-SCNC: 4 MMOL/L — SIGNIFICANT CHANGE UP (ref 3.5–5.3)
PROT UR-MCNC: SIGNIFICANT CHANGE UP MG/DL
RBC # BLD: 2.28 M/UL — LOW (ref 3.8–5.2)
RBC # BLD: 3.65 M/UL — LOW (ref 3.8–5.2)
RBC # FLD: 17 % — HIGH (ref 10.3–14.5)
RBC # FLD: 17.6 % — HIGH (ref 10.3–14.5)
RBC CASTS # UR COMP ASSIST: 518 /HPF — HIGH (ref 0–4)
RH IG SCN BLD-IMP: POSITIVE — SIGNIFICANT CHANGE UP
SODIUM SERPL-SCNC: 135 MMOL/L — SIGNIFICANT CHANGE UP (ref 135–145)
SODIUM SERPL-SCNC: 138 MMOL/L — SIGNIFICANT CHANGE UP (ref 135–145)
SP GR SPEC: 1.02 — SIGNIFICANT CHANGE UP (ref 1–1.03)
SQUAMOUS # UR AUTO: 1 /HPF — SIGNIFICANT CHANGE UP (ref 0–5)
UROBILINOGEN FLD QL: 0.2 MG/DL — SIGNIFICANT CHANGE UP (ref 0.2–1)
WBC # BLD: 1.59 K/UL — LOW (ref 3.8–10.5)
WBC # BLD: 2.11 K/UL — LOW (ref 3.8–10.5)
WBC # FLD AUTO: 1.59 K/UL — LOW (ref 3.8–10.5)
WBC # FLD AUTO: 2.11 K/UL — LOW (ref 3.8–10.5)
WBC UR QL: 12 /HPF — HIGH (ref 0–5)

## 2024-03-22 PROCEDURE — 74178 CT ABD&PLV WO CNTR FLWD CNTR: CPT | Mod: 26

## 2024-03-22 RX ORDER — MAGNESIUM SULFATE 500 MG/ML
2 VIAL (ML) INJECTION ONCE
Refills: 0 | Status: COMPLETED | OUTPATIENT
Start: 2024-03-22 | End: 2024-03-22

## 2024-03-22 RX ORDER — SODIUM CHLORIDE 9 MG/ML
1000 INJECTION, SOLUTION INTRAVENOUS
Refills: 0 | Status: DISCONTINUED | OUTPATIENT
Start: 2024-03-22 | End: 2024-03-25

## 2024-03-22 RX ORDER — POTASSIUM CHLORIDE 20 MEQ
40 PACKET (EA) ORAL ONCE
Refills: 0 | Status: COMPLETED | OUTPATIENT
Start: 2024-03-22 | End: 2024-03-22

## 2024-03-22 RX ADMIN — CARVEDILOL PHOSPHATE 3.12 MILLIGRAM(S): 80 CAPSULE, EXTENDED RELEASE ORAL at 05:41

## 2024-03-22 RX ADMIN — Medication 25 GRAM(S): at 15:07

## 2024-03-22 RX ADMIN — PANTOPRAZOLE SODIUM 40 MILLIGRAM(S): 20 TABLET, DELAYED RELEASE ORAL at 05:41

## 2024-03-22 RX ADMIN — CARVEDILOL PHOSPHATE 3.12 MILLIGRAM(S): 80 CAPSULE, EXTENDED RELEASE ORAL at 18:36

## 2024-03-22 RX ADMIN — Medication 40 MILLIEQUIVALENT(S): at 15:08

## 2024-03-22 RX ADMIN — SODIUM CHLORIDE 70 MILLILITER(S): 9 INJECTION, SOLUTION INTRAVENOUS at 15:07

## 2024-03-22 NOTE — PROGRESS NOTE ADULT - SUBJECTIVE AND OBJECTIVE BOX
Patient is a 73y old  Female who presents with a chief complaint of anemia, generalized weakness, (21 Mar 2024 15:40)    Patient seen this morning,  at bedside. No new issues.     MEDICATIONS  (STANDING):  carvedilol 3.125 milliGRAM(s) Oral every 12 hours  pantoprazole    Tablet 40 milliGRAM(s) Oral before breakfast  potassium chloride   Powder 40 milliEquivalent(s) Oral once  sodium chloride 0.9%. 1000 milliLiter(s) (75 mL/Hr) IV Continuous <Continuous>    MEDICATIONS  (PRN):        Vital Signs Last 24 Hrs  T(C): 36.5 (22 Mar 2024 11:20), Max: 37.5 (21 Mar 2024 18:00)  T(F): 97.7 (22 Mar 2024 11:20), Max: 99.5 (21 Mar 2024 18:00)  HR: 90 (22 Mar 2024 11:20) (90 - 98)  BP: 129/96 (22 Mar 2024 11:20) (115/80 - 160/87)  BP(mean): --  RR: 18 (22 Mar 2024 11:20) (17 - 18)  SpO2: 98% (22 Mar 2024 11:20) (94% - 99%)    Parameters below as of 22 Mar 2024 11:20  Patient On (Oxygen Delivery Method): room air        PE  NAD  Awake, alert  Anicteric, MMM  No c/c/e  No rash grossly                            7.2    1.59  )-----------( 150      ( 22 Mar 2024 06:12 )             21.7       03-22    138  |  110<H>  |  12  ----------------------------<  76  3.2<L>   |  18<L>  |  0.36<L>    Ca    6.0<LL>      22 Mar 2024 06:12  Phos  1.7     03-22  Mg     1.20     03-22

## 2024-03-22 NOTE — DISCHARGE NOTE PROVIDER - DETAILS OF MALNUTRITION DIAGNOSIS/DIAGNOSES
This patient has been assessed with a concern for Malnutrition and was treated during this hospitalization for the following Nutrition diagnosis/diagnoses:     -  03/21/2024: Moderate protein-calorie malnutrition   -  03/21/2024: Underweight (BMI < 19)

## 2024-03-22 NOTE — DISCHARGE NOTE PROVIDER - HOSPITAL COURSE
72 yo f with weakness in setting of recurrent anemia suspected UTI and diarrhea      Problem/Plan - 1:  ·  Problem: Drop in hemoglobin.   ·  Plan: -given recurrent pancytopenia, suspect  anemia again stemming from chemo or disease effect. Pt denies dark or bloody stool  -likely due to chemo   - Serial CBC, stable hgb level   - s/p 3u prbc     Problem/Plan - 2:  ·  Problem: Diarrhea.   ·  Plan: -cdiff /gi pcr studies ordered here. , diarrhea resolved   - monitor.     Problem/Plan - 3:  ·  Problem: Dysuria.   ·  Plan: -sterile pyuria may suggest prior abx use although pt denies.   - C/w Zosyn to cover for possible infection  - CTAP: no bleeding, cystitis w/ ascending UTI (R>L), mod R hydroureteronephrosis & mild L hydroureteronephrosis   - [ ] Consider repeat RBUS in 72hrs (3/25) to monitor hydro -      IMPRESSION:  Moderate right-sided hydroureteronephrosis, similar to findings present   on prior CT.  Bilateral renal parenchymal disease.  Thickened bladder wall with irregular posterior wall thickening. Consider   further evaluation with cystoscopy.    per uro- out pt f/u     Problem/Plan - 4:  ·  Problem: HTN (hypertension).   ·  Plan: c/w coreg 3.125 q 12  - adjust meds as tolerated.     Problem/Plan - 5:  ·  Problem: Encounter for deep vein thrombosis (DVT) prophylaxis.   ·  Plan: DVT and GI PPX.     Problem/Plan - 6:  ·  Problem: Medication management.     On 3/25/24 this case was reviewed with Dr. Garcia, the patient is medically stable and optimized for discharge. All medications were reviewed and prescriptions were sent to mutually agreed upon pharmacy. The patient agrees to follow up with providers as recommended.

## 2024-03-22 NOTE — DISCHARGE NOTE PROVIDER - CARE PROVIDERS DIRECT ADDRESSES
,DirectAddress_Unknown,DirectAddress_Unknown ,DirectAddress_Unknown,DirectAddress_Unknown,[email or web address]

## 2024-03-22 NOTE — DISCHARGE NOTE PROVIDER - NSRESEARCHGRANT_PROPHYLAXISRECOMFT_GEN_A_CORE
.                                                                       Patient Name: [facility]   YOB: 1957 [address] [place] [address]   MRN: [id number]    [place] [address]       Date of Service: 5/10/2019    Subjective       Chief Complaint   Patient presents with   • Follow-up     med check refill      63 yo F w pmhx of fibromyalgia, HTN, OA, RA here for follow up. She complains of worsening joint pain isolated to bilateral wrists and bilateral knees. Denies f /chills/ CP/ SOB/ N/V/ diarrhea. She has an appointment with Rheum in 2 weeks to assess worsening pain. She has been swimming more and thinks it has been helping pain.     Patient's medications, allergies, past medical, surgical, social and family histories were reviewed and updated as appropriate.    .  Past Medical History:   Diagnosis Date   • Cerebral aneurysm    • Diabetes mellitus (CMS/HCC)    • Fibromyalgia    • HTN (hypertension)    • Obesity    • Osteoarthritis    • Seropositive rheumatoid arthritis (CMS/HCC)    • Vitamin D deficiency      .  Current Outpatient Medications   Medication Sig Dispense Refill   • HYDROcodone-acetaminophen (NORCO)  MG per tablet Take 1 tablet by mouth every 8 hours as needed for Pain (fibromyalgia, RA). 110 tablet 0   • omeprazole (PRILOSEC) 20 MG capsule TAKE ONE CAPSULE BY MOUTH ONCE DAILY 30 capsule 3   • fluticasone (FLONASE) 50 MCG/ACT nasal spray SPRAY TWICE INTO EACH NOSTRIL ONCE A DAY 16 g 1   • LANTUS SOLOSTAR 100 UNIT/ML pen-injector INJECT 30 UNITS UNDER THE SKIN ONCE DAILY AT BEDTIME 45 mL 0   • VENTOLIN  (90 Base) MCG/ACT inhaler INHALE 2 PUFFS EVERY 6 HOURS AS NEEDED 18 g 3   • albuterol (VENTOLIN) (2.5 MG/3ML) 0.083% nebulizer solution INHALE 1 VIAL VAI NEBULIZER EVERY 4-6 HOURS AS NEEDED 180 mL 3   • predniSONE (DELTASONE) 5 MG tablet Take 5 mg by mouth daily.     • levothyroxine (SYNTHROID, LEVOTHROID) 125 MCG tablet TAKE 1 TABLET BY MOUTH EVERY MORNING  90 tablet 3   • gabapentin (NEURONTIN) 800 MG tablet Take 1 tablet by mouth 3 times daily. 270 tablet 1   • melatonin 5 MG Take 1 tablet by mouth nightly. 30 tablet 3   • aspirin (ASPIRIN LOW DOSE) 81 MG EC tablet Take 81 mg by mouth daily.     • brimonidine (ALPHAGAN P) 0.1 % ophthalmic solution      • brinzolamide-brimonidine (SIMBRINZA) 1-0.2 % ophthalmic suspension      • latanoprost (XALATAN) 0.005 % ophthalmic solution      • liraglutide (VICTOZA) 18 MG/3ML pen-injector      • Respiratory Therapy Supplies (NEBULIZER) Device Use q4 hours as needed 1 Device 0   • Cholecalciferol (VITAMIN D3) 10186 units Cap Take one capsule once a week for 3 months     • alendronate (FOSAMAX) 35 MG tablet TAKE 1 TABLET BY MOUTH ONCE EVERY WEEK 60 MINUTES BEFORE BREAKFAST ON EMPTY STOMACH WITH 8 OUNCES OF WATER DO NOT LIE DOWN AFTER TAKING THE     • docusate sodium (DOK) 100 MG capsule TK ONE C PO BID HOLD IF MORE THAN 1 BOWEL MOVEMENT PER DAY OR LOOS STO     • enalapril-hydrochlorothiazide (VASERETIC) 10-25 MG per tablet Take 1 tablet by mouth daily.     • Lancets (FREESTYLE) Misc Please check blood sugar three times a day.     • Insulin Syringe 31G X 5/16\" 0.5 ML Misc      • insulin lispro (HUMALOG) 100 UNIT/ML injectable solution INJECT 15 UNIT 3 TIMES DAILY     • oxybutynin (DITROPAN) 5 MG tablet TAKE 1 TABLET BY MOUTH EVERY DAY     • olopatadine (PAZEO) 0.7 % ophthalmic solution      • Sennosides (SENNA) 8.6 MG Tab TK 1 T PO D PRN HOLD IF GRATER THAN 1 BOWEL MOVEMENT PER DAY OR LOOSE     • sennosides-docusate sodium (SENOKOT-S) 8.6-50 MG tablet Take 1 tablet by mouth daily.     • silver sulfADIAZINE (THERMAZENE) 1 % cream APPLY TO AFFECTED AREA TWICE DAILY AS DIRECTED.     • liraglutide (VICTOZA) 18 MG/3ML pen-injector      • Alcohol Swabs (ALCOHOL PREP) 70 % Pads Please use to clean finger before testing blood sugar 3 times per day       No current facility-administered medications for this visit.      .  Past Surgical  History:   Procedure Laterality Date   • Brain aneurysm surgery  10/2014    [facility]   • Brain aneurysm surgery     • Cholecystectomy     • Hysterectomy       .No family history on file.  .  Social History     Socioeconomic History   • Marital status:      Spouse name: Not on file   • Number of children: Not on file   • Years of education: Not on file   • Highest education level: Not on file   Occupational History   • Not on file   Social Needs   • Financial resource strain: Not on file   • Food insecurity:     Worry: Not on file     Inability: Not on file   • Transportation needs:     Medical: Not on file     Non-medical: Not on file   Tobacco Use   • Smoking status: Never Smoker   • Smokeless tobacco: Never Used   Substance and Sexual Activity   • Alcohol use: No     Frequency: Never   • Drug use: Not on file   • Sexual activity: Not on file   Lifestyle   • Physical activity:     Days per week: Not on file     Minutes per session: Not on file   • Stress: Not on file   Relationships   • Social connections:     Talks on phone: Not on file     Gets together: Not on file     Attends [employer] service: Not on file     Active member of club or organization: Not on file     Attends meetings of clubs or organizations: Not on file     Relationship status: Not on file   • Intimate partner violence:     Fear of current or ex partner: Not on file     Emotionally abused: Not on file     Physically abused: Not on file     Forced sexual activity: Not on file   Other Topics Concern   • Not on file   Social History Narrative   • Not on file       Review of Systems   Constitutional: Negative for activity change, appetite change, chills, diaphoresis and fever.   HENT: Negative for postnasal drip and rhinorrhea.    Respiratory: Negative for chest tightness and shortness of breath.    Cardiovascular: Negative for chest pain and leg swelling.   Gastrointestinal: Negative for diarrhea, nausea and vomiting.    Genitourinary: Negative for dysuria and hematuria.   Musculoskeletal: Positive for arthralgias, gait problem, joint swelling and myalgias.   Skin: Negative for rash.   Neurological: Negative for dizziness and light-headedness.       Objective     Vitals:    05/10/19 1057   BP: 108/60   Pulse: 80       Physical Exam   Constitutional: She is oriented to person, place, and time. She appears well-nourished. No distress.   HENT:   Head: Normocephalic and atraumatic.   Eyes: Conjunctivae and EOM are normal. Right eye exhibits no discharge. Left eye exhibits no discharge.   Neck: Neck supple. No thyromegaly present.   Cardiovascular: Normal rate, regular rhythm, normal heart sounds and intact distal pulses. Exam reveals no gallop and no friction rub.   No murmur heard.  Pulmonary/Chest: Effort normal and breath sounds normal. No stridor. No respiratory distress. She has no wheezes. She has no rales.   Abdominal: Soft. Bowel sounds are normal. She exhibits no distension. There is no tenderness. There is no guarding.   Musculoskeletal: She exhibits tenderness. She exhibits no edema or deformity.   Lymphadenopathy:     She has no cervical adenopathy.   Neurological: She is alert and oriented to person, place, and time.   Skin: Skin is warm and dry. She is not diaphoretic.   Psychiatric: She has a normal mood and affect. Her behavior is normal. Thought content normal.       Assessment and Plan       Problem List Items Addressed This Visit        Musculoskeletal    Osteoarthritis of knee     - has rheum f/u in 2 weeks  - pt not interested in following up with pain specialist  - encouraged importance of physical therapy, low impact activity         Relevant Medications    HYDROcodone-acetaminophen (NORCO)  MG per tablet    Arthralgia of both hands    Relevant Medications    HYDROcodone-acetaminophen (NORCO)  MG per tablet          Discussed with attending  Sushma Chinni, DO  5/10/2019     IMPROVE-DD Application Not Available

## 2024-03-22 NOTE — DISCHARGE NOTE PROVIDER - CARE PROVIDER_API CALL
Easton Harris  Phone: (   )    -  Fax: (   )    -  Follow Up Time: 1 week    Your PCP,   Phone: (   )    -  Fax: (   )    -  Follow Up Time: 1 week   Easton Harris  Phone: (   )    -  Fax: (   )    -  Follow Up Time: 1 week    Your PCP,   Phone: (   )    -  Fax: (   )    -  Follow Up Time: 1 week    Kristian Murray  Urology  [address] [place] [id number]  Phone: [phone]  Fax: [phone]  Follow Up Time: 2 weeks

## 2024-03-22 NOTE — PROVIDER CONTACT NOTE (CRITICAL VALUE NOTIFICATION) - ASSESSMENT
hemoglobin 6.5
Patient resting comfortable in bed with no signs of distress.
glucose 591 calcium 6.5

## 2024-03-22 NOTE — DISCHARGE NOTE PROVIDER - NSDCCPCAREPLAN_GEN_ALL_CORE_FT
PRINCIPAL DISCHARGE DIAGNOSIS  Diagnosis: Anemia  Assessment and Plan of Treatment: You were given blood transfusions and your levels improved. Discuss your cancer treatment regimen with your oncologist. Eat and hydrate well.      SECONDARY DISCHARGE DIAGNOSES  Diagnosis: Dysuria  Assessment and Plan of Treatment: Follow up with the urologist to further assist in managing your painful urination. You were treated with antibiotics prophylactically but likely your issues are not related to bacteria as your cultures did not grow any bacteria.   If you are experiencing vaginal dryness, speak to your doctor for treatment options as the dryness may be contributing to burning sypmtoms. Speak to your local pharmacist about over the counter options. You may also use barrier cream on the external skin of the vulva if it is irritated.    Diagnosis: Abnormal ultrasound of bladder  Assessment and Plan of Treatment: follow up with the urologist. They may consider a cystoscopy for further evaluation of your findings    Diagnosis: Breast cancer  Assessment and Plan of Treatment:     Diagnosis: Adult failure to thrive  Assessment and Plan of Treatment:     Diagnosis: Diarrhea  Assessment and Plan of Treatment: resolved     PRINCIPAL DISCHARGE DIAGNOSIS  Diagnosis: Anemia  Assessment and Plan of Treatment: You were given blood transfusions and your levels improved. Discuss your cancer treatment regimen with your oncologist. Eat and hydrate well. Follow up with your PCP on friday, you can get a repeat blood test just to check up on it.      SECONDARY DISCHARGE DIAGNOSES  Diagnosis: Dysuria  Assessment and Plan of Treatment: Follow up with the urologist to further assist in managing your painful urination. You were treated with antibiotics prophylactically but likely your issues are not related to bacteria as your cultures did not grow any bacteria.   If you are experiencing vaginal dryness, speak to your doctor for treatment options as the dryness may be contributing to burning sypmtoms. Speak to your local pharmacist about over the counter options. You may also use barrier cream on the external skin of the vulva if it is irritated.  Follow up with your doctor if you experience fever.    Diagnosis: Abnormal ultrasound of bladder  Assessment and Plan of Treatment: follow up with the urologist. They may consider a cystoscopy for further evaluation of your findings. You should also show these results to your Oncologist.    Diagnosis: Low magnesium level  Assessment and Plan of Treatment: You can take the over the counter supplement called Slow Mag. Check your magnesium level when you go to see your PCP on Friday.    Diagnosis: Breast cancer  Assessment and Plan of Treatment: Follow up with your Oncologist    Diagnosis: Adult failure to thrive  Assessment and Plan of Treatment:     Diagnosis: Diarrhea  Assessment and Plan of Treatment: resolved

## 2024-03-22 NOTE — DISCHARGE NOTE PROVIDER - NSDCCPTREATMENT_GEN_ALL_CORE_FT
PRINCIPAL PROCEDURE  Procedure: Ultrasound of kidney and bladder  Findings and Treatment: FINDINGS:  Mild increased echogenicity of the kidneys.  Right kidney: 11.8 cm. Moderate right-sided hydroureteronephrosis.  Left kidney: 10.0 cm. No renal mass, hydronephrosis or calculi.  Urinary bladder: Diffusely thickened with irregular thickening along the   posterior wall.  IMPRESSION:  Moderate right-sided hydroureteronephrosis, similar to findings present   on prior CT.  Bilateral renal parenchymal disease.  Thickened bladder wall with irregular posterior wall thickening. Consider   further evaluation with cystoscopy.      SECONDARY PROCEDURE  Procedure: CT abdomen pelvis  Findings and Treatment: IMPRESSION:  No contrast extravasation to suggest site of active bleed.  Cystitis with probable ascending urinary tract infection, right greater   than left.  Moderate right hydroureteronephrosis and mild left hydroureteronephrosis   to the level of the urinary bladder.

## 2024-03-22 NOTE — PHYSICAL THERAPY INITIAL EVALUATION ADULT - GENERAL OBSERVATIONS, REHAB EVAL
DISPLAY PLAN FREE TEXT Pt encountered in semi-supine position in NAD, all lines intact, a&ox4, SPO2 98%,  at bedside, and RN Melani aware.

## 2024-03-22 NOTE — PROGRESS NOTE ADULT - SUBJECTIVE AND OBJECTIVE BOX
Name of Patient : YOSEF CHILD  MRN: [id number]  Date of visit: 24       Subjective: Patient seen and examined. No new events except as noted.   Drop in hgb again  + weakness    REVIEW OF SYSTEMS:    CONSTITUTIONAL: No weakness, fevers or chills  EYES/ENT: No visual changes;  No vertigo or throat pain   NECK: No pain or stiffness  RESPIRATORY: No cough, wheezing, hemoptysis; No shortness of breath  CARDIOVASCULAR: No chest pain or palpitations  GASTROINTESTINAL: No abdominal or epigastric pain. No nausea, vomiting, or hematemesis; No diarrhea or constipation. No melena or hematochezia.  GENITOURINARY: No dysuria, frequency or hematuria  NEUROLOGICAL: No numbness or weakness  SKIN: No itching, burning, rashes, or lesions   All other review of systems is negative unless indicated above.    MEDICATIONS:  MEDICATIONS  (STANDING):  carvedilol 3.125 milliGRAM(s) Oral every 12 hours  lactated ringers. 1000 milliLiter(s) (70 mL/Hr) IV Continuous <Continuous>  pantoprazole    Tablet 40 milliGRAM(s) Oral before breakfast      PHYSICAL EXAM:  T(C): 36.9 (24 @ 21:00), Max: 37.1 (24 @ 22:44)  HR: 95 (24 @ 21:00) (90 - 100)  BP: 136/96 (24 @ 21:00) (115/80 - 160/87)  RR: 18 (24 @ 21:00) (18 - 18)  SpO2: 96% (24 @ 21:00) (94% - 99%)  Wt(kg): --  I&O's Summary    21 Mar 2024 07:01  -  22 Mar 2024 07:00  --------------------------------------------------------  IN: 0 mL / OUT: 600 mL / NET: -600 mL          Appearance: Normal	  HEENT:  PERRLA   Lymphatic: No lymphadenopathy   Cardiovascular: Normal S1 S2, no JVD  Respiratory: normal effort , clear  Gastrointestinal:  Soft, Non-tender  Skin: No rashes,  warm to touch  Psychiatry:  Mood & affect appropriate  Musculuskeletal: No edema    recent labs, Imaging and EKGs personally reviewed     24 @ 07:01  -  24 @ 07:00  --------------------------------------------------------  IN: 0 mL / OUT: 600 mL / NET: -600 mL                            11.1   2.11  )-----------( 186      ( 22 Mar 2024 17:40 )             33.2                   135  |  100  |  16  ----------------------------<  110<H>  4.0   |  22  |  0.51    Ca    8.3<L>      22 Mar 2024 17:40  Phos  1.7       Mg     1.20                              Urinalysis Basic - ( 22 Mar 2024 17:45 )    Color: Yellow / Appearance: Clear / S.021 / pH: x  Gluc: x / Ketone: Negative mg/dL  / Bili: Negative / Urobili: 0.2 mg/dL   Blood: x / Protein: Trace mg/dL / Nitrite: Negative   Leuk Esterase: Small / RBC: 518 /HPF / WBC 12 /HPF   Sq Epi: x / Non Sq Epi: 1 /HPF / Bacteria: Negative /HPF

## 2024-03-22 NOTE — PHYSICAL THERAPY INITIAL EVALUATION ADULT - PERTINENT HX OF CURRENT PROBLEM, REHAB EVAL
Patient is a 75 year old Female, PMH stated below, presents with anemia, generalized weakness; suspected UTI and diarrhea.

## 2024-03-22 NOTE — PHYSICAL THERAPY INITIAL EVALUATION ADULT - ACTIVE RANGE OF MOTION EXAMINATION, REHAB EVAL
romelia. upper extremity Active ROM was WNL (within normal limits)/bilateral lower extremity Active ROM was WNL (within normal limits)

## 2024-03-22 NOTE — DISCHARGE NOTE PROVIDER - PROVIDER TOKENS
FREE:[LAST:[Safonov],FIRST:[[place]],PHONE:[(   )    -],FAX:[(   )    -],FOLLOWUP:[1 week]],FREE:[LAST:[Your PCP],PHONE:[(   )    -],FAX:[(   )    -],FOLLOWUP:[1 week]] FREE:[LAST:[Safonov],FIRST:[[place]],PHONE:[(   )    -],FAX:[(   )    -],FOLLOWUP:[1 week]],FREE:[LAST:[Your PCP],PHONE:[(   )    -],FAX:[(   )    -],FOLLOWUP:[1 week]],PROVIDER:[TOKEN:[[id number]],FOLLOWUP:[2 weeks]]

## 2024-03-22 NOTE — DISCHARGE NOTE PROVIDER - NSFOLLOWUPCLINICS_GEN_ALL_ED_FT
[facility]  [address] [place] [address]  Phone: [phone]  Fax:   Follow Up Time: 2 weeks

## 2024-03-22 NOTE — PHYSICAL THERAPY INITIAL EVALUATION ADULT - ADDITIONAL COMMENTS
Pt left semisupine in bed in NAD, all lines intact, call bell in reach, SPO2 98%, bed alarm on,  at bedside, and RN Melani aware.

## 2024-03-22 NOTE — PROGRESS NOTE ADULT - SUBJECTIVE AND OBJECTIVE BOX
[facility] NEPHROLOGY- PROGRESS NOTE    73y Female with history of breast CA presents with diarrhea. Nephrology consulted for hypokalemia.    REVIEW OF SYSTEMS:  Gen: no fevers  Cards: no chest pain  Resp: no dyspnea  GI: no nausea or vomiting or diarrhea, + ab pain  : + gross hematuria  Vascular: no LE edema    No Known Allergies      Hospital Medications: Medications reviewed      VITALS:  T(F): 97.7 (03-22-24 @ 11:20), Max: 99.5 (03-21-24 @ 18:00)  HR: 90 (03-22-24 @ 11:20)  BP: 129/96 (03-22-24 @ 11:20)  RR: 18 (03-22-24 @ 11:20)  SpO2: 98% (03-22-24 @ 11:20)  Wt(kg): --    03-21 @ 07:01  -  03-22 @ 07:00  --------------------------------------------------------  IN: 0 mL / OUT: 600 mL / NET: -600 mL        PHYSICAL EXAM:    Gen: NAD, calm  Cards: RRR, +S1/S2, no M/G/R  Resp: CTA B/L  GI: soft, NT/ND, NABS  Vascular: no LE edema B/L      LABS:  03-22    138  |  110<H>  |  12  ----------------------------<  76  3.2<L>   |  18<L>  |  0.36<L>    Ca    6.0<LL>      22 Mar 2024 06:12  Phos  1.7     03-22  Mg     1.20     03-22      Creatinine Trend: 0.36 <--, 0.55 <--, 0.45 <--, 0.60 <--, 0.44 <--, 0.72 <--                        7.2    1.59  )-----------( 150      ( 22 Mar 2024 06:12 )             21.7     Urine Studies:  Urinalysis Basic - ( 22 Mar 2024 06:12 )    Color:  / Appearance:  / SG:  / pH:   Gluc: 76 mg/dL / Ketone:   / Bili:  / Urobili:    Blood:  / Protein:  / Nitrite:    Leuk Esterase:  / RBC:  / WBC    Sq Epi:  / Non Sq Epi:  / Bacteria:       Potassium, Random Urine: 27.6 mmol/L (03-19 @ 13:40)  Creatinine, Random Urine: 34 mg/dL (03-19 @ 13:40)

## 2024-03-23 LAB
ANION GAP SERPL CALC-SCNC: 10 MMOL/L — SIGNIFICANT CHANGE UP (ref 7–14)
BUN SERPL-MCNC: 21 MG/DL — SIGNIFICANT CHANGE UP (ref 7–23)
CALCIUM SERPL-MCNC: 8.1 MG/DL — LOW (ref 8.4–10.5)
CHLORIDE SERPL-SCNC: 103 MMOL/L — SIGNIFICANT CHANGE UP (ref 98–107)
CO2 SERPL-SCNC: 21 MMOL/L — LOW (ref 22–31)
CREAT SERPL-MCNC: 0.49 MG/DL — LOW (ref 0.5–1.3)
CULTURE RESULTS: NO GROWTH — SIGNIFICANT CHANGE UP
EGFR: 98 ML/MIN/1.73M2 — SIGNIFICANT CHANGE UP
GLUCOSE SERPL-MCNC: 91 MG/DL — SIGNIFICANT CHANGE UP (ref 70–99)
HCT VFR BLD CALC: 29.9 % — LOW (ref 34.5–45)
HGB BLD-MCNC: 10.1 G/DL — LOW (ref 11.5–15.5)
MAGNESIUM SERPL-MCNC: 1.7 MG/DL — SIGNIFICANT CHANGE UP (ref 1.6–2.6)
MCHC RBC-ENTMCNC: 30.6 PG — SIGNIFICANT CHANGE UP (ref 27–34)
MCHC RBC-ENTMCNC: 33.8 GM/DL — SIGNIFICANT CHANGE UP (ref 32–36)
MCV RBC AUTO: 90.6 FL — SIGNIFICANT CHANGE UP (ref 80–100)
NRBC # BLD: 0 /100 WBCS — SIGNIFICANT CHANGE UP (ref 0–0)
NRBC # FLD: 0 K/UL — SIGNIFICANT CHANGE UP (ref 0–0)
PHOSPHATE SERPL-MCNC: 2.8 MG/DL — SIGNIFICANT CHANGE UP (ref 2.5–4.5)
PLATELET # BLD AUTO: 180 K/UL — SIGNIFICANT CHANGE UP (ref 150–400)
POTASSIUM SERPL-MCNC: 4 MMOL/L — SIGNIFICANT CHANGE UP (ref 3.5–5.3)
POTASSIUM SERPL-SCNC: 4 MMOL/L — SIGNIFICANT CHANGE UP (ref 3.5–5.3)
RBC # BLD: 3.3 M/UL — LOW (ref 3.8–5.2)
RBC # FLD: 18.1 % — HIGH (ref 10.3–14.5)
SODIUM SERPL-SCNC: 134 MMOL/L — LOW (ref 135–145)
SPECIMEN SOURCE: SIGNIFICANT CHANGE UP
WBC # BLD: 2.23 K/UL — LOW (ref 3.8–10.5)
WBC # FLD AUTO: 2.23 K/UL — LOW (ref 3.8–10.5)

## 2024-03-23 PROCEDURE — 99232 SBSQ HOSP IP/OBS MODERATE 35: CPT

## 2024-03-23 RX ORDER — PIPERACILLIN AND TAZOBACTAM 4; .5 G/20ML; G/20ML
3.38 INJECTION, POWDER, LYOPHILIZED, FOR SOLUTION INTRAVENOUS ONCE
Refills: 0 | Status: COMPLETED | OUTPATIENT
Start: 2024-03-23 | End: 2024-03-23

## 2024-03-23 RX ORDER — PIPERACILLIN AND TAZOBACTAM 4; .5 G/20ML; G/20ML
3.38 INJECTION, POWDER, LYOPHILIZED, FOR SOLUTION INTRAVENOUS EVERY 8 HOURS
Refills: 0 | Status: DISCONTINUED | OUTPATIENT
Start: 2024-03-23 | End: 2024-03-25

## 2024-03-23 RX ADMIN — CARVEDILOL PHOSPHATE 3.12 MILLIGRAM(S): 80 CAPSULE, EXTENDED RELEASE ORAL at 06:30

## 2024-03-23 RX ADMIN — PIPERACILLIN AND TAZOBACTAM 200 GRAM(S): 4; .5 INJECTION, POWDER, LYOPHILIZED, FOR SOLUTION INTRAVENOUS at 11:48

## 2024-03-23 RX ADMIN — PIPERACILLIN AND TAZOBACTAM 25 GRAM(S): 4; .5 INJECTION, POWDER, LYOPHILIZED, FOR SOLUTION INTRAVENOUS at 16:32

## 2024-03-23 RX ADMIN — CARVEDILOL PHOSPHATE 3.12 MILLIGRAM(S): 80 CAPSULE, EXTENDED RELEASE ORAL at 18:45

## 2024-03-23 RX ADMIN — PANTOPRAZOLE SODIUM 40 MILLIGRAM(S): 20 TABLET, DELAYED RELEASE ORAL at 06:28

## 2024-03-23 RX ADMIN — PIPERACILLIN AND TAZOBACTAM 25 GRAM(S): 4; .5 INJECTION, POWDER, LYOPHILIZED, FOR SOLUTION INTRAVENOUS at 22:54

## 2024-03-23 NOTE — PROGRESS NOTE ADULT - SUBJECTIVE AND OBJECTIVE BOX
INTERVAL HPI/OVERNIGHT EVENTS:  Patient S&E at bedside. No o/n events,     VITAL SIGNS:  T(F): 99.1 (24 @ 05:08)  HR: 95 (24 @ 05:08)  BP: 135/83 (24 @ 05:08)  RR: 18 (24 @ 05:08)  SpO2: 96% (24 @ 05:08)  Wt(kg): --    PHYSICAL EXAM:    PE  NAD  Awake, alert  Anicteric, MMM  abd soft, mildly ttp  No c/c/e  No rash grossly    MEDICATIONS  (STANDING):  carvedilol 3.125 milliGRAM(s) Oral every 12 hours  lactated ringers. 1000 milliLiter(s) (70 mL/Hr) IV Continuous <Continuous>  pantoprazole    Tablet 40 milliGRAM(s) Oral before breakfast    MEDICATIONS  (PRN):      Allergies    No Known Allergies    Intolerances        LABS:                        11.1   2.11  )-----------( 186      ( 22 Mar 2024 17:40 )             33.2         135  |  100  |  16  ----------------------------<  110<H>  4.0   |  22  |  0.51    Ca    8.3<L>      22 Mar 2024 17:40  Phos  1.7       Mg     1.20             Urinalysis Basic - ( 22 Mar 2024 17:45 )    Color: Yellow / Appearance: Clear / S.021 / pH: x  Gluc: x / Ketone: Negative mg/dL  / Bili: Negative / Urobili: 0.2 mg/dL   Blood: x / Protein: Trace mg/dL / Nitrite: Negative   Leuk Esterase: Small / RBC: 518 /HPF / WBC 12 /HPF   Sq Epi: x / Non Sq Epi: 1 /HPF / Bacteria: Negative /HPF        RADIOLOGY & ADDITIONAL TESTS:  Studies reviewed.

## 2024-03-23 NOTE — PROGRESS NOTE ADULT - SUBJECTIVE AND OBJECTIVE BOX
[facility] NEPHROLOGY- PROGRESS NOTE    73y Female with history of breast CA presents with diarrhea. Nephrology consulted for hypokalemia.    REVIEW OF SYSTEMS:  Gen: no fevers  Cards: no chest pain  Resp: no dyspnea  GI: no nausea or vomiting or diarrhea, + ab pain  : + gross hematuria  Vascular: no LE edema    No Known Allergies      Hospital Medications: Medications reviewed      VITALS:  T(F): 99.1 (03-23-24 @ 05:08), Max: 99.1 (03-23-24 @ 05:08)  HR: 91 (03-23-24 @ 06:25)  BP: 133/94 (03-23-24 @ 06:25)  RR: 18 (03-23-24 @ 05:08)  SpO2: 96% (03-23-24 @ 05:08)  Wt(kg): --      PHYSICAL EXAM:    Gen: NAD, calm  Cards: RRR, +S1/S2, no M/G/R  Resp: CTA B/L  GI: soft, NT/ND, NABS  Gu: Gross hematuria  Vascular: no LE edema B/L      LABS:  03-23    134<L>  |  103  |  21  ----------------------------<  91  4.0   |  21<L>  |  0.49<L>    Ca    8.1<L>      23 Mar 2024 06:24  Phos  2.8     03-23  Mg     1.70     03-23      Creatinine Trend: 0.49 <--, 0.51 <--, 0.36 <--, 0.55 <--, 0.45 <--, 0.60 <--, 0.44 <--, 0.72 <--                        10.1   2.23  )-----------( 180      ( 23 Mar 2024 06:24 )             29.9     Urine Studies:  Urinalysis Basic - ( 23 Mar 2024 06:24 )    Color:  / Appearance:  / SG:  / pH:   Gluc: 91 mg/dL / Ketone:   / Bili:  / Urobili:    Blood:  / Protein:  / Nitrite:    Leuk Esterase:  / RBC:  / WBC    Sq Epi:  / Non Sq Epi:  / Bacteria:       Potassium, Random Urine: 27.6 mmol/L (03-19 @ 13:40)  Creatinine, Random Urine: 34 mg/dL (03-19 @ 13:40)    < from: CT Abdomen and Pelvis w/wo IV Cont (03.22.24 @ 16:40) >    ACC: [id number] EXAM:  CT ABDOMEN AND PELVIS WAW IC   ORDERED BY: REJI YI     PROCEDURE DATE:  03/22/2024        < end of copied text >  < from: CT Abdomen and Pelvis w/wo IV Cont (03.22.24 @ 16:40) >  KIDNEYS/URETERS: Moderate right hydroureteronephrosis and mild left   hydroureteronephrosis to the level of the urinary bladder. Bilateral   urothelial thickening, right greater than left.    < end of copied text >  < from: CT Abdomen and Pelvis w/wo IV Cont (03.22.24 @ 16:40) >  IMPRESSION:  No contrast extravasation to suggest site of active bleed.    Cystitis with probable ascending urinary tract infection, right greater   than left.    Moderate right hydroureteronephrosis and mild left hydroureteronephrosis   to the level of the urinary bladder.    --- End of Report ---      < end of copied text >

## 2024-03-23 NOTE — CONSULT NOTE ADULT - SUBJECTIVE AND OBJECTIVE BOX
HPI  74 yo f  h/o breast cancer s/p Taxol 3/14 was evaluated earlier today at outpt [facility] in  setting of weakness, dysuria, and diarrhea. Sent to [facility] in setting of hypotension and low Hb. Here in ed Hb 6.9 compared to 9 in feb. at that time pt also found  to be  anemic requiring  prbc x 2; anemia then attributed to malignancy and treatment in setting of pancytopenia. Pt also  with leukopenia and relative thrombocytopenia currently. Denies dark or bloody stools. Reports undergoing endoscopy many years ago.   UA c/w sterile pyuria, denies recent abx use     Patient is a 72yo F w/ history of breast cancer on taxol presenting with weakness, dysuria, and difficulty with urination. She denies any fevers, chills, nausea, vomiting, and flank pain. Urology consulted as patient found to have dark urine with concern for hematuria. On 3/22 patients H/H found to be 7.2/21.7 since been given 1u pRBC and now H/H is 10.1/29.9 (more than adequate response). Since being admitted patients has been afebrile with vital signs stable. Labs notable for a WBC of 2.2, H/H of 10.1/29.9, and a Cr of .49. Patients UA had WBC 12 and RBC of 518 with urine culture being negative. Patients CT scan (CT urogram) shows mod/severe R hydronephrosis with delayed imaging showing that contrast did not go completely to bladder on R side and R ureter appears to be kinked in the mid-ureter. Of note, patient had a RUQ US a couple of months ago in our system which showed no hydronephrosis in the R kidney.     PAST MEDICAL & SURGICAL HISTORY:  Essential hypertension      Breast cancer      At risk for infection due to chemotherapy      Carcinoma of breast treated with adjuvant chemotherapy          MEDICATIONS  (STANDING):  carvedilol 3.125 milliGRAM(s) Oral every 12 hours  lactated ringers. 1000 milliLiter(s) (70 mL/Hr) IV Continuous <Continuous>  pantoprazole    Tablet 40 milliGRAM(s) Oral before breakfast  piperacillin/tazobactam IVPB.- 3.375 Gram(s) IV Intermittent once  piperacillin/tazobactam IVPB.. 3.375 Gram(s) IV Intermittent every 8 hours    MEDICATIONS  (PRN):      FAMILY HISTORY:      Allergies    No Known Allergies    Intolerances        SOCIAL HISTORY:    REVIEW OF SYSTEMS: Otherwise negative as stated in HPI    Physical Exam  Vital signs  T(C): 37.3 (03-23-24 @ 05:08), Max: 37.3 (03-23-24 @ 05:08)  HR: 91 (03-23-24 @ 06:25)  BP: 133/94 (03-23-24 @ 06:25)  SpO2: 96% (03-23-24 @ 05:08)  Wt(kg): --    Output      Gen:  NAD    Pulm:  No respiratory distress  	  CV:  RRR    GI:  S/ND/NT    :  No CVAT bilaterally    MSK:      LABS:      03-23 @ 06:24    WBC 2.23  / Hct 29.9  / SCr 0.49     03-22 @ 17:40    WBC 2.11  / Hct 33.2  / SCr 0.51     03-23    134<L>  |  103  |  21  ----------------------------<  91  4.0   |  21<L>  |  0.49<L>    Ca    8.1<L>      23 Mar 2024 06:24  Phos  2.8     03-23  Mg     1.70     03-23        Urinalysis Basic - ( 23 Mar 2024 06:24 )    Color: x / Appearance: x / SG: x / pH: x  Gluc: 91 mg/dL / Ketone: x  / Bili: x / Urobili: x   Blood: x / Protein: x / Nitrite: x   Leuk Esterase: x / RBC: x / WBC x   Sq Epi: x / Non Sq Epi: x / Bacteria: x        Urine Cx:  Blood Cx:    RADIOLOGY:     HPI  72 yo f  h/o breast cancer s/p Taxol 3/14 was evaluated earlier today at outpt [facility] in  setting of weakness, dysuria, and diarrhea. Sent to [facility] in setting of hypotension and low Hb. Here in ed Hb 6.9 compared to 9 in feb. at that time pt also found  to be  anemic requiring  prbc x 2; anemia then attributed to malignancy and treatment in setting of pancytopenia. Pt also  with leukopenia and relative thrombocytopenia currently. Denies dark or bloody stools. Reports undergoing endoscopy many years ago.   UA c/w sterile pyuria, denies recent abx use     Patient is a 74yo F w/ history of breast cancer on taxol presenting with weakness, dysuria, and difficulty with urination. She denies any fevers, chills, nausea, vomiting, and flank pain. Urology consulted as patient found to have dark urine with concern for hematuria. On 3/22 patients H/H found to be 7.2/21.7 since been given 1u pRBC and now H/H is 10.1/29.9 (more than adequate response). Since being admitted patients has been afebrile with vital signs stable. Labs notable for a WBC of 2.2, H/H of 10.1/29.9, and a Cr of .49. Patients UA had WBC 12 and RBC of 518 with urine culture being negative. Patients CT scan (CT urogram) shows mod/severe R hydronephrosis with delayed imaging showing that contrast did not go completely to bladder on R side and R ureter appears to be kinked in the mid-ureter. Of note, patient had a RUQ US a couple of months ago in our system which showed no hydronephrosis in the R kidney.     PAST MEDICAL & SURGICAL HISTORY:  Essential hypertension      Breast cancer      At risk for infection due to chemotherapy      Carcinoma of breast treated with adjuvant chemotherapy          MEDICATIONS  (STANDING):  carvedilol 3.125 milliGRAM(s) Oral every 12 hours  lactated ringers. 1000 milliLiter(s) (70 mL/Hr) IV Continuous <Continuous>  pantoprazole    Tablet 40 milliGRAM(s) Oral before breakfast  piperacillin/tazobactam IVPB.- 3.375 Gram(s) IV Intermittent once  piperacillin/tazobactam IVPB.. 3.375 Gram(s) IV Intermittent every 8 hours    MEDICATIONS  (PRN):      FAMILY HISTORY:      Allergies    No Known Allergies    Intolerances        SOCIAL HISTORY:    REVIEW OF SYSTEMS: Otherwise negative as stated in HPI    Physical Exam  Vital signs  T(C): 37.3 (03-23-24 @ 05:08), Max: 37.3 (03-23-24 @ 05:08)  HR: 91 (03-23-24 @ 06:25)  BP: 133/94 (03-23-24 @ 06:25)  SpO2: 96% (03-23-24 @ 05:08)  Wt(kg): --    Output      Gen:  NAD    Pulm:  No respiratory distress  	  CV:  RRR    GI:  S/ND/NT    :  No CVAT bilaterally. Urine color dark orange but difficult to appreciate in cannister.    MSK:      LABS:      03-23 @ 06:24    WBC 2.23  / Hct 29.9  / SCr 0.49     03-22 @ 17:40    WBC 2.11  / Hct 33.2  / SCr 0.51     03-23    134<L>  |  103  |  21  ----------------------------<  91  4.0   |  21<L>  |  0.49<L>    Ca    8.1<L>      23 Mar 2024 06:24  Phos  2.8     03-23  Mg     1.70     03-23        Urinalysis Basic - ( 23 Mar 2024 06:24 )    Color: x / Appearance: x / SG: x / pH: x  Gluc: 91 mg/dL / Ketone: x  / Bili: x / Urobili: x   Blood: x / Protein: x / Nitrite: x   Leuk Esterase: x / RBC: x / WBC x   Sq Epi: x / Non Sq Epi: x / Bacteria: x        Urine Cx:  Blood Cx:    RADIOLOGY:

## 2024-03-23 NOTE — CONSULT NOTE ADULT - ASSESSMENT
72yo F presenting with dysuria, weakness with urology being consulted for mod/severe hydronephrosis in the R kidney but is otherwise asymptomatic with no flank pain, no signs of overt infection (flank pain, fevers, chills), and kidney function wnl. Delayed imaging shows that contrast does not make it to bladder through the R ureter although there are no signs of over obstruction.     Plan  - Continue to monitor for overt signs of infection,   - At this time patient is asymptomatic so no indication for intervention but is patient were to develop signs of infection, have worsening flank pain, or TATY would re-evaluate and consider stent placement  - Can consider repeat RBUS in 72 hours to monitor hydronephrosis   - Continue broad spectrum abx  - Discussed with Dr. Murray 74yo F presenting with dysuria, weakness with urology being consulted for mod/severe hydronephrosis in the R kidney but is otherwise asymptomatic with no flank pain, no signs of overt infection (flank pain, fevers, chills), and kidney function wnl. Delayed imaging shows that contrast does not make it to bladder through the R ureter although there are no signs of over obstruction.     Plan  - Continue to monitor for overt signs of infection,   - At this time patient is asymptomatic so no indication for intervention but is patient were to develop signs of infection, have worsening flank pain, or TATY would re-evaluate and consider stent placement  - Can consider repeat RBUS in 72 hours to monitor hydronephrosis   - Continue broad spectrum abx  - patients urine color appears to be dark orange which may be 2/2 pyridium that patient was given. Do not believe urine color explains patients drop in H/H and given that she had more than adequate response to 1u pRBC do not think she has any acute ongoing bleed. Please get PVR to ensure patient is emptying and formal bladder US to assess for any clots.   - Discussed with Dr. Murray

## 2024-03-23 NOTE — PROGRESS NOTE ADULT - SUBJECTIVE AND OBJECTIVE BOX
Name of Patient : YOSEF CHILD  MRN: [id number]  Date of visit: 03-23-24       Subjective: Patient seen and examined. No new events except as noted.   doing better today  mild improvement in hematuria     REVIEW OF SYSTEMS:    CONSTITUTIONAL: No weakness, fevers or chills  EYES/ENT: No visual changes;  No vertigo or throat pain   NECK: No pain or stiffness  RESPIRATORY: No cough, wheezing, hemoptysis; No shortness of breath  CARDIOVASCULAR: No chest pain or palpitations  GASTROINTESTINAL: No abdominal or epigastric pain. No nausea, vomiting, or hematemesis; No diarrhea or constipation. No melena or hematochezia.  GENITOURINARY: No dysuria, frequency or hematuria  NEUROLOGICAL: No numbness or weakness  SKIN: No itching, burning, rashes, or lesions   All other review of systems is negative unless indicated above.    MEDICATIONS:  MEDICATIONS  (STANDING):  carvedilol 3.125 milliGRAM(s) Oral every 12 hours  lactated ringers. 1000 milliLiter(s) (70 mL/Hr) IV Continuous <Continuous>  pantoprazole    Tablet 40 milliGRAM(s) Oral before breakfast  piperacillin/tazobactam IVPB.. 3.375 Gram(s) IV Intermittent every 8 hours      PHYSICAL EXAM:  T(C): 36.8 (03-23-24 @ 13:41), Max: 37.3 (03-23-24 @ 05:08)  HR: 97 (03-23-24 @ 18:40) (91 - 97)  BP: 130/82 (03-23-24 @ 18:40) (111/78 - 136/96)  RR: 18 (03-23-24 @ 13:41) (18 - 18)  SpO2: 100% (03-23-24 @ 13:41) (96% - 100%)  Wt(kg): --  I&O's Summary        Appearance: Normal	  HEENT:  PERRLA   Lymphatic: No lymphadenopathy   Cardiovascular: Normal S1 S2, no JVD  Respiratory: normal effort , clear  Gastrointestinal:  Soft, Non-tender  Skin: No rashes,  warm to touch  Psychiatry:  Mood & affect appropriate  Musculuskeletal: No edema    recent labs, Imaging and EKGs personally reviewed                           10.1   2.23  )-----------( 180      ( 23 Mar 2024 06:24 )             29.9               03-23    134<L>  |  103  |  21  ----------------------------<  91  4.0   |  21<L>  |  0.49<L>    Ca    8.1<L>      23 Mar 2024 06:24  Phos  2.8     03-23  Mg     1.70     03-23                         Urinalysis Basic - ( 23 Mar 2024 06:24 )    Color: x / Appearance: x / SG: x / pH: x  Gluc: 91 mg/dL / Ketone: x  / Bili: x / Urobili: x   Blood: x / Protein: x / Nitrite: x   Leuk Esterase: x / RBC: x / WBC x   Sq Epi: x / Non Sq Epi: x / Bacteria: x

## 2024-03-24 LAB
ANION GAP SERPL CALC-SCNC: 11 MMOL/L — SIGNIFICANT CHANGE UP (ref 7–14)
BUN SERPL-MCNC: 22 MG/DL — SIGNIFICANT CHANGE UP (ref 7–23)
CALCIUM SERPL-MCNC: 8.2 MG/DL — LOW (ref 8.4–10.5)
CHLORIDE SERPL-SCNC: 98 MMOL/L — SIGNIFICANT CHANGE UP (ref 98–107)
CO2 SERPL-SCNC: 25 MMOL/L — SIGNIFICANT CHANGE UP (ref 22–31)
CREAT SERPL-MCNC: 0.49 MG/DL — LOW (ref 0.5–1.3)
EGFR: 98 ML/MIN/1.73M2 — SIGNIFICANT CHANGE UP
GLUCOSE SERPL-MCNC: 134 MG/DL — HIGH (ref 70–99)
HCT VFR BLD CALC: 28.9 % — LOW (ref 34.5–45)
HGB BLD-MCNC: 9.6 G/DL — LOW (ref 11.5–15.5)
MAGNESIUM SERPL-MCNC: 1.5 MG/DL — LOW (ref 1.6–2.6)
MCHC RBC-ENTMCNC: 30.6 PG — SIGNIFICANT CHANGE UP (ref 27–34)
MCHC RBC-ENTMCNC: 33.2 GM/DL — SIGNIFICANT CHANGE UP (ref 32–36)
MCV RBC AUTO: 92 FL — SIGNIFICANT CHANGE UP (ref 80–100)
NRBC # BLD: 0 /100 WBCS — SIGNIFICANT CHANGE UP (ref 0–0)
NRBC # FLD: 0 K/UL — SIGNIFICANT CHANGE UP (ref 0–0)
PHOSPHATE SERPL-MCNC: 3.2 MG/DL — SIGNIFICANT CHANGE UP (ref 2.5–4.5)
PLATELET # BLD AUTO: 173 K/UL — SIGNIFICANT CHANGE UP (ref 150–400)
POTASSIUM SERPL-MCNC: 3.9 MMOL/L — SIGNIFICANT CHANGE UP (ref 3.5–5.3)
POTASSIUM SERPL-SCNC: 3.9 MMOL/L — SIGNIFICANT CHANGE UP (ref 3.5–5.3)
RBC # BLD: 3.14 M/UL — LOW (ref 3.8–5.2)
RBC # FLD: 17.3 % — HIGH (ref 10.3–14.5)
SODIUM SERPL-SCNC: 134 MMOL/L — LOW (ref 135–145)
WBC # BLD: 2.21 K/UL — LOW (ref 3.8–10.5)
WBC # FLD AUTO: 2.21 K/UL — LOW (ref 3.8–10.5)

## 2024-03-24 RX ORDER — MAGNESIUM OXIDE 400 MG ORAL TABLET 241.3 MG
400 TABLET ORAL ONCE
Refills: 0 | Status: COMPLETED | OUTPATIENT
Start: 2024-03-24 | End: 2024-03-24

## 2024-03-24 RX ADMIN — PIPERACILLIN AND TAZOBACTAM 25 GRAM(S): 4; .5 INJECTION, POWDER, LYOPHILIZED, FOR SOLUTION INTRAVENOUS at 05:51

## 2024-03-24 RX ADMIN — CARVEDILOL PHOSPHATE 3.12 MILLIGRAM(S): 80 CAPSULE, EXTENDED RELEASE ORAL at 18:16

## 2024-03-24 RX ADMIN — PANTOPRAZOLE SODIUM 40 MILLIGRAM(S): 20 TABLET, DELAYED RELEASE ORAL at 08:01

## 2024-03-24 RX ADMIN — MAGNESIUM OXIDE 400 MG ORAL TABLET 400 MILLIGRAM(S): 241.3 TABLET ORAL at 13:22

## 2024-03-24 RX ADMIN — CARVEDILOL PHOSPHATE 3.12 MILLIGRAM(S): 80 CAPSULE, EXTENDED RELEASE ORAL at 05:51

## 2024-03-24 RX ADMIN — PIPERACILLIN AND TAZOBACTAM 25 GRAM(S): 4; .5 INJECTION, POWDER, LYOPHILIZED, FOR SOLUTION INTRAVENOUS at 22:29

## 2024-03-24 RX ADMIN — PIPERACILLIN AND TAZOBACTAM 25 GRAM(S): 4; .5 INJECTION, POWDER, LYOPHILIZED, FOR SOLUTION INTRAVENOUS at 13:36

## 2024-03-24 NOTE — PROGRESS NOTE ADULT - SUBJECTIVE AND OBJECTIVE BOX
Name of Patient : YOSEF CHILD  MRN: [id number]  Date of visit: 03-24-24       Subjective: Patient seen and examined. No new events except as noted.     REVIEW OF SYSTEMS:    CONSTITUTIONAL: No weakness, fevers or chills  EYES/ENT: No visual changes;  No vertigo or throat pain   NECK: No pain or stiffness  RESPIRATORY: No cough, wheezing, hemoptysis; No shortness of breath  CARDIOVASCULAR: No chest pain or palpitations  GASTROINTESTINAL: No abdominal or epigastric pain. No nausea, vomiting, or hematemesis; No diarrhea or constipation. No melena or hematochezia.  GENITOURINARY: No dysuria, frequency or hematuria  NEUROLOGICAL: No numbness or weakness  SKIN: No itching, burning, rashes, or lesions   All other review of systems is negative unless indicated above.    MEDICATIONS:  MEDICATIONS  (STANDING):  carvedilol 3.125 milliGRAM(s) Oral every 12 hours  lactated ringers. 1000 milliLiter(s) (70 mL/Hr) IV Continuous <Continuous>  pantoprazole    Tablet 40 milliGRAM(s) Oral before breakfast  piperacillin/tazobactam IVPB.. 3.375 Gram(s) IV Intermittent every 8 hours      PHYSICAL EXAM:  T(C): 36.6 (03-24-24 @ 18:29), Max: 37 (03-24-24 @ 05:45)  HR: 100 (03-24-24 @ 18:29) (91 - 104)  BP: 134/89 (03-24-24 @ 18:29) (129/90 - 142/86)  RR: 18 (03-24-24 @ 18:29) (18 - 18)  SpO2: 97% (03-24-24 @ 18:29) (97% - 99%)  Wt(kg): --  I&O's Summary        Appearance: Normal	  HEENT:  PERRLA   Lymphatic: No lymphadenopathy   Cardiovascular: Normal S1 S2, no JVD  Respiratory: normal effort , clear  Gastrointestinal:  Soft, Non-tender  Skin: No rashes,  warm to touch  Psychiatry:  Mood & affect appropriate  Musculuskeletal: No edema    recent labs, Imaging and EKGs personally reviewed                           9.6    2.21  )-----------( 173      ( 24 Mar 2024 06:53 )             28.9               03-24    134<L>  |  98  |  22  ----------------------------<  134<H>  3.9   |  25  |  0.49<L>    Ca    8.2<L>      24 Mar 2024 06:53  Phos  3.2     03-24  Mg     1.50     03-24                         Urinalysis Basic - ( 24 Mar 2024 06:53 )    Color: x / Appearance: x / SG: x / pH: x  Gluc: 134 mg/dL / Ketone: x  / Bili: x / Urobili: x   Blood: x / Protein: x / Nitrite: x   Leuk Esterase: x / RBC: x / WBC x   Sq Epi: x / Non Sq Epi: x / Bacteria: x

## 2024-03-24 NOTE — PROGRESS NOTE ADULT - SUBJECTIVE AND OBJECTIVE BOX
[facility] NEPHROLOGY- PROGRESS NOTE    73y Female with history of breast CA presents with diarrhea. Nephrology consulted for hypokalemia.    REVIEW OF SYSTEMS:  Gen: no fevers  Cards: no chest pain  Resp: no dyspnea  GI: no nausea or vomiting or diarrhea, + ab pain  : + gross hematuria  Vascular: no LE edema    No Known Allergies      Hospital Medications: Medications reviewed      VITALS:  T(F): 98.6 (03-24-24 @ 05:45), Max: 98.6 (03-24-24 @ 05:45)  HR: 91 (03-24-24 @ 05:45)  BP: 139/92 (03-24-24 @ 05:45)  RR: 18 (03-24-24 @ 05:45)  SpO2: 99% (03-24-24 @ 05:45)  Wt(kg): --        PHYSICAL EXAM:    Gen: NAD, calm  Cards: RRR, +S1/S2, no M/G/R  Resp: CTA B/L  GI: soft, NT/ND, NABS  Vascular: no LE edema B/L      LABS:  03-24    134<L>  |  98  |  22  ----------------------------<  134<H>  3.9   |  25  |  0.49<L>    Ca    8.2<L>      24 Mar 2024 06:53  Phos  3.2     03-24  Mg     1.50     03-24      Creatinine Trend: 0.49 <--, 0.49 <--, 0.51 <--, 0.36 <--, 0.55 <--, 0.45 <--, 0.60 <--, 0.44 <--, 0.72 <--                        9.6    2.21  )-----------( 173      ( 24 Mar 2024 06:53 )             28.9     Urine Studies:  Urinalysis Basic - ( 24 Mar 2024 06:53 )    Color:  / Appearance:  / SG:  / pH:   Gluc: 134 mg/dL / Ketone:   / Bili:  / Urobili:    Blood:  / Protein:  / Nitrite:    Leuk Esterase:  / RBC:  / WBC    Sq Epi:  / Non Sq Epi:  / Bacteria:       Potassium, Random Urine: 27.6 mmol/L (03-19 @ 13:40)  Creatinine, Random Urine: 34 mg/dL (03-19 @ 13:40)         < from: CT Abdomen and Pelvis w/wo IV Cont (03.22.24 @ 16:40) >  KIDNEYS/URETERS: Moderate right hydroureteronephrosis and mild left   hydroureteronephrosis to the level of the urinary bladder. Bilateral   urothelial thickening, right greater than left.    < end of copied text >  < from: CT Abdomen and Pelvis w/wo IV Cont (03.22.24 @ 16:40) >  IMPRESSION:  No contrast extravasation to suggest site of active bleed.    Cystitis with probable ascending urinary tract infection, right greater   than left.    Moderate right hydroureteronephrosis and mild left hydroureteronephrosis   to the level of the urinary bladder.    --- End of Report ---      < end of copied text >

## 2024-03-24 NOTE — PROGRESS NOTE ADULT - SUBJECTIVE AND OBJECTIVE BOX
INTERVAL HPI/OVERNIGHT EVENTS:  Patient S&E at bedside. No o/n events. Urology c/s appreciated- plan to repeat RBUS in 72 hours- no complaints this morning/ no flank pain reported     VITAL SIGNS:  T(F): 98.6 (03-24-24 @ 05:45)  HR: 91 (03-24-24 @ 05:45)  BP: 139/92 (03-24-24 @ 05:45)  RR: 18 (03-24-24 @ 05:45)  SpO2: 99% (03-24-24 @ 05:45)  Wt(kg): --    PHYSICAL EXAM:    Constitutional: NAD  Eyes: EOMI, sclera non-icteric  Neck: supple  Respiratory: CTAB, no wheezes or crackles   Cardiovascular: RRR  Gastrointestinal: soft, NTND, + BS  Extremities: no cyanosis, clubbing or edema   Neurological: awake and alert      MEDICATIONS  (STANDING):  carvedilol 3.125 milliGRAM(s) Oral every 12 hours  lactated ringers. 1000 milliLiter(s) (70 mL/Hr) IV Continuous <Continuous>  pantoprazole    Tablet 40 milliGRAM(s) Oral before breakfast  piperacillin/tazobactam IVPB.. 3.375 Gram(s) IV Intermittent every 8 hours    MEDICATIONS  (PRN):      Allergies    No Known Allergies    Intolerances        LABS:                        10.1   2.23  )-----------( 180      ( 23 Mar 2024 06:24 )             29.9     03-23    134<L>  |  103  |  21  ----------------------------<  91  4.0   |  21<L>  |  0.49<L>    Ca    8.1<L>      23 Mar 2024 06:24  Phos  2.8     03-23  Mg     1.70     03-23        Urinalysis Basic - ( 23 Mar 2024 06:24 )    Color: x / Appearance: x / SG: x / pH: x  Gluc: 91 mg/dL / Ketone: x  / Bili: x / Urobili: x   Blood: x / Protein: x / Nitrite: x   Leuk Esterase: x / RBC: x / WBC x   Sq Epi: x / Non Sq Epi: x / Bacteria: x        RADIOLOGY & ADDITIONAL TESTS:  Studies reviewed.   INTERVAL HPI/OVERNIGHT EVENTS:  Patient S&E at bedside. No o/n events. Urology c/s appreciated- plan to repeat RBUS in 72 hours- no complaints this morning/ no flank pain reported.  at bedside. Discussed care, asymptomatic.     VITAL SIGNS:  T(F): 98.6 (03-24-24 @ 05:45)  HR: 91 (03-24-24 @ 05:45)  BP: 139/92 (03-24-24 @ 05:45)  RR: 18 (03-24-24 @ 05:45)  SpO2: 99% (03-24-24 @ 05:45)  Wt(kg): --    PHYSICAL EXAM:    Constitutional: NAD  Eyes: EOMI, sclera non-icteric  Neck: supple  Respiratory: CTAB, no wheezes or crackles   Cardiovascular: RRR  Gastrointestinal: soft, NTND, + BS  Extremities: no cyanosis, clubbing or edema   Neurological: awake and alert      MEDICATIONS  (STANDING):  carvedilol 3.125 milliGRAM(s) Oral every 12 hours  lactated ringers. 1000 milliLiter(s) (70 mL/Hr) IV Continuous <Continuous>  pantoprazole    Tablet 40 milliGRAM(s) Oral before breakfast  piperacillin/tazobactam IVPB.. 3.375 Gram(s) IV Intermittent every 8 hours    MEDICATIONS  (PRN):      Allergies    No Known Allergies    Intolerances        LABS:                        10.1   2.23  )-----------( 180      ( 23 Mar 2024 06:24 )             29.9     03-23    134<L>  |  103  |  21  ----------------------------<  91  4.0   |  21<L>  |  0.49<L>    Ca    8.1<L>      23 Mar 2024 06:24  Phos  2.8     03-23  Mg     1.70     03-23        Urinalysis Basic - ( 23 Mar 2024 06:24 )    Color: x / Appearance: x / SG: x / pH: x  Gluc: 91 mg/dL / Ketone: x  / Bili: x / Urobili: x   Blood: x / Protein: x / Nitrite: x   Leuk Esterase: x / RBC: x / WBC x   Sq Epi: x / Non Sq Epi: x / Bacteria: x        RADIOLOGY & ADDITIONAL TESTS:  Studies reviewed.

## 2024-03-25 ENCOUNTER — TRANSCRIPTION ENCOUNTER (OUTPATIENT)
Age: 74
End: 2024-03-25

## 2024-03-25 VITALS
SYSTOLIC BLOOD PRESSURE: 113 MMHG | DIASTOLIC BLOOD PRESSURE: 75 MMHG | TEMPERATURE: 98 F | OXYGEN SATURATION: 97 % | HEART RATE: 85 BPM | RESPIRATION RATE: 18 BRPM

## 2024-03-25 LAB
ANION GAP SERPL CALC-SCNC: 11 MMOL/L — SIGNIFICANT CHANGE UP (ref 7–14)
BUN SERPL-MCNC: 23 MG/DL — SIGNIFICANT CHANGE UP (ref 7–23)
CALCIUM SERPL-MCNC: 8.4 MG/DL — SIGNIFICANT CHANGE UP (ref 8.4–10.5)
CHLORIDE SERPL-SCNC: 100 MMOL/L — SIGNIFICANT CHANGE UP (ref 98–107)
CO2 SERPL-SCNC: 24 MMOL/L — SIGNIFICANT CHANGE UP (ref 22–31)
CREAT SERPL-MCNC: 0.5 MG/DL — SIGNIFICANT CHANGE UP (ref 0.5–1.3)
EGFR: 98 ML/MIN/1.73M2 — SIGNIFICANT CHANGE UP
GLUCOSE SERPL-MCNC: 86 MG/DL — SIGNIFICANT CHANGE UP (ref 70–99)
HCT VFR BLD CALC: 29.3 % — LOW (ref 34.5–45)
HGB BLD-MCNC: 9.9 G/DL — LOW (ref 11.5–15.5)
MAGNESIUM SERPL-MCNC: 1.5 MG/DL — LOW (ref 1.6–2.6)
MCHC RBC-ENTMCNC: 31.2 PG — SIGNIFICANT CHANGE UP (ref 27–34)
MCHC RBC-ENTMCNC: 33.8 GM/DL — SIGNIFICANT CHANGE UP (ref 32–36)
MCV RBC AUTO: 92.4 FL — SIGNIFICANT CHANGE UP (ref 80–100)
NRBC # BLD: 0 /100 WBCS — SIGNIFICANT CHANGE UP (ref 0–0)
NRBC # FLD: 0 K/UL — SIGNIFICANT CHANGE UP (ref 0–0)
PHOSPHATE SERPL-MCNC: 3 MG/DL — SIGNIFICANT CHANGE UP (ref 2.5–4.5)
PLATELET # BLD AUTO: 185 K/UL — SIGNIFICANT CHANGE UP (ref 150–400)
POTASSIUM SERPL-MCNC: 3.7 MMOL/L — SIGNIFICANT CHANGE UP (ref 3.5–5.3)
POTASSIUM SERPL-SCNC: 3.7 MMOL/L — SIGNIFICANT CHANGE UP (ref 3.5–5.3)
RBC # BLD: 3.17 M/UL — LOW (ref 3.8–5.2)
RBC # FLD: 16.7 % — HIGH (ref 10.3–14.5)
SODIUM SERPL-SCNC: 135 MMOL/L — SIGNIFICANT CHANGE UP (ref 135–145)
WBC # BLD: 2.27 K/UL — LOW (ref 3.8–10.5)
WBC # FLD AUTO: 2.27 K/UL — LOW (ref 3.8–10.5)

## 2024-03-25 PROCEDURE — 76770 US EXAM ABDO BACK WALL COMP: CPT | Mod: 26

## 2024-03-25 RX ORDER — THIAMINE MONONITRATE (VIT B1) 100 MG
1 TABLET ORAL
Qty: 30 | Refills: 0
Start: 2024-03-25 | End: 2024-04-23

## 2024-03-25 RX ORDER — MAGNESIUM SULFATE 500 MG/ML
1 VIAL (ML) INJECTION ONCE
Refills: 0 | Status: COMPLETED | OUTPATIENT
Start: 2024-03-25 | End: 2024-03-25

## 2024-03-25 RX ORDER — ASCORBIC ACID 60 MG
1 TABLET,CHEWABLE ORAL
Qty: 30 | Refills: 0
Start: 2024-03-25 | End: 2024-04-23

## 2024-03-25 RX ORDER — CALCIUM CARBONATE 500(1250)
1 TABLET ORAL
Qty: 30 | Refills: 0
Start: 2024-03-25 | End: 2024-04-23

## 2024-03-25 RX ORDER — MAGNESIUM SULFATE 500 MG/ML
2 VIAL (ML) INJECTION ONCE
Refills: 0 | Status: COMPLETED | OUTPATIENT
Start: 2024-03-25 | End: 2024-03-25

## 2024-03-25 RX ADMIN — PANTOPRAZOLE SODIUM 40 MILLIGRAM(S): 20 TABLET, DELAYED RELEASE ORAL at 05:04

## 2024-03-25 RX ADMIN — PIPERACILLIN AND TAZOBACTAM 25 GRAM(S): 4; .5 INJECTION, POWDER, LYOPHILIZED, FOR SOLUTION INTRAVENOUS at 05:04

## 2024-03-25 RX ADMIN — CARVEDILOL PHOSPHATE 3.12 MILLIGRAM(S): 80 CAPSULE, EXTENDED RELEASE ORAL at 05:04

## 2024-03-25 RX ADMIN — Medication 100 GRAM(S): at 13:19

## 2024-03-25 RX ADMIN — Medication 25 GRAM(S): at 11:27

## 2024-03-25 NOTE — PROGRESS NOTE ADULT - NS ATTEND AMEND GEN_ALL_CORE FT
Agree with above assessment and plan
has burning with urination. on abx. no diarrhea . will cont to monitor symptoms
Agree with above, pt feeling better, dysuria improving
As above.    73F with triple negative breast cancer, on neoadjuvant chemotherapy at [facility], admitted with dysuria and diarrhea. [place] [facility] [place]. Follow-up urology recommendations. Continue to monitor cytopenia, which is likely due to chemotherapy.    No systemic chemotherapy inpatient or during rehabilitation.

## 2024-03-25 NOTE — DISCHARGE NOTE NURSING/CASE MANAGEMENT/SOCIAL WORK - PATIENT PORTAL LINK FT
You can access the FollowMyHealth Patient Portal offered by [facility] by registering at the following website: [email or web address]. By joining [facility]’s FollowMyHealth portal, you will also be able to view your health information using other applications (apps) compatible with our system.

## 2024-03-25 NOTE — PROGRESS NOTE ADULT - PROBLEM SELECTOR PROBLEM 1
Drop in hemoglobin

## 2024-03-25 NOTE — PROGRESS NOTE ADULT - SUBJECTIVE AND OBJECTIVE BOX
Patient is a 73y old  Female who presents with a chief complaint of anemia, generalized weakness, (24 Mar 2024 12:23)    Pt seen this morning, with her spouse at bedside. She feels well overall, states she is getting a cream that helps w the external burning when she urinates.     MEDICATIONS  (STANDING):  carvedilol 3.125 milliGRAM(s) Oral every 12 hours  lactated ringers. 1000 milliLiter(s) (70 mL/Hr) IV Continuous <Continuous>  magnesium sulfate  IVPB 1 Gram(s) IV Intermittent once  pantoprazole    Tablet 40 milliGRAM(s) Oral before breakfast  piperacillin/tazobactam IVPB.. 3.375 Gram(s) IV Intermittent every 8 hours    MEDICATIONS  (PRN):      Vital Signs Last 24 Hrs  T(C): 36.6 (25 Mar 2024 10:21), Max: 36.7 (24 Mar 2024 22:48)  T(F): 97.9 (25 Mar 2024 10:21), Max: 98.1 (24 Mar 2024 22:48)  HR: 85 (25 Mar 2024 10:21) (85 - 104)  BP: 113/75 (25 Mar 2024 10:21) (113/75 - 142/86)  BP(mean): --  RR: 18 (25 Mar 2024 10:21) (18 - 18)  SpO2: 97% (25 Mar 2024 10:21) (95% - 97%)    Parameters below as of 25 Mar 2024 05:00  Patient On (Oxygen Delivery Method): room air        PE  NAD  Awake, alert  Anicteric, MMM  Abd soft, NT, ND  No c/c/e  No rash grossly                            9.9    2.27  )-----------( 185      ( 25 Mar 2024 05:15 )             29.3       03-25    135  |  100  |  23  ----------------------------<  86  3.7   |  24  |  0.50    Ca    8.4      25 Mar 2024 05:15  Phos  3.0     03-25  Mg     1.50     03-25        ACC: [id number] EXAM:  US KIDNEYS AND BLADDER   ORDERED BY: ANALIA YEPEZ     PROCEDURE DATE:  03/25/2024          INTERPRETATION:  CLINICAL INFORMATION: Hydronephrosis follow-up    COMPARISON: Ultrasound abdomen 2/13/2024, CT abdomen and pelvis 3/22/2024    TECHNIQUE: Sonography of the kidneys and bladder.    FINDINGS:  Mild increased echogenicity of the kidneys.    Right kidney: 11.8 cm. Moderate right-sided hydroureteronephrosis.    Left kidney: 10.0 cm. No renal mass, hydronephrosis or calculi.    Urinary bladder: Diffusely thickened with irregular thickening along the   posterior wall.    IMPRESSION:  Moderate right-sided hydroureteronephrosis, similar to findings present   on prior CT.  Bilateral renal parenchymal disease.  Thickened bladder wall with irregular posterior wall thickening. Consider   further evaluation with cystoscopy.    --- End of Report ---

## 2024-03-25 NOTE — PROGRESS NOTE ADULT - REASON FOR ADMISSION
anemia, generalized weakness,

## 2024-03-25 NOTE — PROGRESS NOTE ADULT - PROBLEM SELECTOR PLAN 4
c/w coreg 3.125 q 12  - adjust meds as tolerated

## 2024-03-25 NOTE — PROGRESS NOTE ADULT - PROBLEM SELECTOR PLAN 2
- isolation  -cdiff /gi pcr studies ordered here.   - monitor
-cdiff /gi pcr studies ordered here. , diarrhea resolved   - monitor

## 2024-03-25 NOTE — PROGRESS NOTE ADULT - PROBLEM SELECTOR PLAN 1
-given recurrent pancytopenia, suspect  anemia again stemming from chemo or disease effect. Pt denies dark or bloody stool  -likely due to chemo   - Serial CBC, stable hgb level   - transfuse to keep hgb above 7   - hematology eval appreciated   - Anemia W/U, noted  - hematuria worsening  - check CT ABD/Pelv urogram   - Urology eval called   - 1 unit PRBC
-given recurrent pancytopenia, suspect  anemia again stemming from chemo or disease effect. Pt denies dark or bloody stool  -likely due to chemo   - DSerial CBC  - trasnfuse to keep hgb above 7   - hematologye sarah called   - Anemai W/U
-given recurrent pancytopenia, suspect  anemia again stemming from chemo or disease effect. Pt denies dark or bloody stool  -likely due to chemo   - Serial CBC, stable hgb level   - transfuse to keep hgb above 7   - hematology eval appreciated   - Anemia W/U, noted  - hematuria worsening  - check CT ABD/Pelv urogram , noted, no site of bleeding, cystitis with hydrouteronephrosis   - Urology eval appreciated   - Resume ABx and awaite cx  - 1 unit PRBC given, responded well
-given recurrent pancytopenia, suspect  anemia again stemming from chemo or disease effect. Pt denies dark or bloody stool  -likely due to chemo   - Serial CBC, stable hgb level   - transfuse to keep hgb above 7   - hematology eval appreciated   - Anemia W/U, noted  - hematuria worsening  - check CT ABD/Pelv urogram , noted, no site of bleeding, cystitis with hydrouteronephrosis   - Urology eval called   - Resume ABx and awaite cx  - 1 unit PRBC given, responded well
-given recurrent pancytopenia, suspect  anemia again stemming from chemo or disease effect. Pt denies dark or bloody stool  -likely due to chemo   - Serial CBC, stable hgb level   - transfuse to keep hgb above 7   - hematology eval appreciated   - Anemia W/U, noted
-given recurrent pancytopenia, suspect  anemia again stemming from chemo or disease effect. Pt denies dark or bloody stool  -likely due to chemo   - Serial CBC, stable hgb level   - transfuse to keep hgb above 7   - hematology eval appreciated   - Anemia W/U, noted  - hematuria worsening  - check CT ABD/Pelv urogram , noted, no site of bleeding, cystitis with hydroperinephrosis   - Urology eval appreciated   - Resume ABx zosyn x 3 days   - 1 unit PRBC given, responded well
-given recurrent pancytopenia, suspect  anemia again stemming from chemo or disease effect. Pt denies dark or bloody stool  -likely due to chemo   - DSerial CBC, stable hgb level   - trasnfuse to keep hgb above 7   - hematology eval appreciated   - Anemai W/U, noted

## 2024-03-25 NOTE — PROGRESS NOTE ADULT - PROBLEM SELECTOR PLAN 3
-sterile pyuria may suggest prior abx use although pt denies.   - follow up cx   - cont rocpehin, 3 days total   - Oral and IV hydraiton
-sterile pyuria may suggest prior abx use although pt denies.   - follow up cx   - cont rocpehin, 3 days total   - Oral and IV hydraiton  - gentle hydration
-sterile pyuria may suggest prior abx use although pt denies.   - follow up cx   - cont rocpehin  - Oral and IV hydraiton
-sterile pyuria may suggest prior abx use although pt denies.   - follow up cx   - cont Rocephin, 3 days total   - Oral and IV hydration  - gentle hydration

## 2024-03-25 NOTE — PROGRESS NOTE ADULT - SUBJECTIVE AND OBJECTIVE BOX
[facility] NEPHROLOGY- PROGRESS NOTE    73y Female with history of breast CA presents with diarrhea. Nephrology consulted for hypokalemia.    REVIEW OF SYSTEMS:  Gen: no fevers  Cards: no chest pain  Resp: no dyspnea  GI: no nausea or vomiting or diarrhea  : + gross hematuria  Vascular: no LE edema    No Known Allergies      Hospital Medications: Medications reviewed      VITALS:  T(F): 97.9 (03-25-24 @ 10:21), Max: 98.1 (03-24-24 @ 22:48)  HR: 85 (03-25-24 @ 10:21)  BP: 113/75 (03-25-24 @ 10:21)  RR: 18 (03-25-24 @ 10:21)  SpO2: 97% (03-25-24 @ 10:21)  Wt(kg): --        PHYSICAL EXAM:    Gen: NAD, calm  Cards: RRR, +S1/S2, no M/G/R  Resp: CTA B/L  GI: soft, NT/ND, NABS  Vascular: no LE edema B/L      LABS:  03-25    135  |  100  |  23  ----------------------------<  86  3.7   |  24  |  0.50    Ca    8.4      25 Mar 2024 05:15  Phos  3.0     03-25  Mg     1.50     03-25      Creatinine Trend: 0.50 <--, 0.49 <--, 0.49 <--, 0.51 <--, 0.36 <--, 0.55 <--, 0.45 <--, 0.60 <--, 0.44 <--, 0.72 <--                        9.9    2.27  )-----------( 185      ( 25 Mar 2024 05:15 )             29.3     Urine Studies:  Urinalysis Basic - ( 25 Mar 2024 05:15 )    Color:  / Appearance:  / SG:  / pH:   Gluc: 86 mg/dL / Ketone:   / Bili:  / Urobili:    Blood:  / Protein:  / Nitrite:    Leuk Esterase:  / RBC:  / WBC    Sq Epi:  / Non Sq Epi:  / Bacteria:       Potassium, Random Urine: 27.6 mmol/L (03-19 @ 13:40)  Creatinine, Random Urine: 34 mg/dL (03-19 @ 13:40)

## 2024-03-25 NOTE — PROGRESS NOTE ADULT - SUBJECTIVE AND OBJECTIVE BOX
Name of Patient : YOSEF CHILD  MRN: [id number]  Date of visit: 03-25-24       Subjective: Patient seen and examined. No new events except as noted.   Doing okay     REVIEW OF SYSTEMS:    CONSTITUTIONAL: No weakness, fevers or chills  EYES/ENT: No visual changes;  No vertigo or throat pain   NECK: No pain or stiffness  RESPIRATORY: No cough, wheezing, hemoptysis; No shortness of breath  CARDIOVASCULAR: No chest pain or palpitations  GASTROINTESTINAL: No abdominal or epigastric pain. No nausea, vomiting, or hematemesis; No diarrhea or constipation. No melena or hematochezia.  GENITOURINARY: No dysuria, frequency or hematuria  NEUROLOGICAL: No numbness or weakness  SKIN: No itching, burning, rashes, or lesions   All other review of systems is negative unless indicated above.    MEDICATIONS:  MEDICATIONS  (STANDING):  carvedilol 3.125 milliGRAM(s) Oral every 12 hours  lactated ringers. 1000 milliLiter(s) (70 mL/Hr) IV Continuous <Continuous>  piperacillin/tazobactam IVPB.. 3.375 Gram(s) IV Intermittent every 8 hours      PHYSICAL EXAM:  T(C): 36.6 (03-25-24 @ 10:21), Max: 36.7 (03-24-24 @ 22:48)  HR: 85 (03-25-24 @ 10:21) (85 - 104)  BP: 113/75 (03-25-24 @ 10:21) (113/75 - 142/86)  RR: 18 (03-25-24 @ 10:21) (18 - 18)  SpO2: 97% (03-25-24 @ 10:21) (95% - 97%)  Wt(kg): --  I&O's Summary        Appearance: Normal	  HEENT:  PERRLA   Lymphatic: No lymphadenopathy   Cardiovascular: Normal S1 S2, no JVD  Respiratory: normal effort , clear  Gastrointestinal:  Soft, Non-tender  Skin: No rashes,  warm to touch  Psychiatry:  Mood & affect appropriate  Musculuskeletal: No edema    recent labs, Imaging and EKGs personally reviewed                           9.9 2.27  )-----------( 185      ( 25 Mar 2024 05:15 )             29.3               03-25    135  |  100  |  23  ----------------------------<  86  3.7   |  24  |  0.50    Ca    8.4      25 Mar 2024 05:15  Phos  3.0     03-25  Mg     1.50     03-25                         Urinalysis Basic - ( 25 Mar 2024 05:15 )    Color: x / Appearance: x / SG: x / pH: x  Gluc: 86 mg/dL / Ketone: x  / Bili: x / Urobili: x   Blood: x / Protein: x / Nitrite: x   Leuk Esterase: x / RBC: x / WBC x   Sq Epi: x / Non Sq Epi: x / Bacteria: x

## 2024-03-25 NOTE — PROGRESS NOTE ADULT - PROBLEM SELECTOR PLAN 5
DVT and GI PPX

## 2024-03-25 NOTE — PROGRESS NOTE ADULT - NUTRITIONAL ASSESSMENT
This patient has been assessed with a concern for Malnutrition and has been determined to have a diagnosis/diagnoses of Moderate protein-calorie malnutrition and Underweight (BMI < 19).    This patient is being managed with:   Diet Regular-  Liquid Protein Supplement     Qty per Day:  1  Supplement Feeding Modality:  Oral  Ensure Plus High Protein Cans or Servings Per Day:  1       Frequency:  Daily  Entered: Mar 19 2024 10:32AM  
This patient has been assessed with a concern for Malnutrition and has been determined to have a diagnosis/diagnoses of Moderate protein-calorie malnutrition and Underweight (BMI < 19).    This patient is being managed with:   Diet Regular-  Liquid Protein Supplement     Qty per Day:  1  Supplement Feeding Modality:  Oral  Ensure Plus High Protein Cans or Servings Per Day:  1       Frequency:  Daily  Entered: Mar 19 2024 10:32AM  
Oriented - self; Oriented - place; Oriented - time
This patient has been assessed with a concern for Malnutrition and has been determined to have a diagnosis/diagnoses of Moderate protein-calorie malnutrition and Underweight (BMI < 19).    This patient is being managed with:   Diet Regular-  Liquid Protein Supplement     Qty per Day:  1  Supplement Feeding Modality:  Oral  Ensure Plus High Protein Cans or Servings Per Day:  1       Frequency:  Daily  Entered: Mar 19 2024 10:32AM  

## 2024-03-25 NOTE — PROGRESS NOTE ADULT - PROVIDER SPECIALTY LIST ADULT
Heme/Onc
Heme/Onc
Nephrology
Nephrology
Heme/Onc
Heme/Onc
Nephrology
Heme/Onc
Heme/Onc
Internal Medicine
Nephrology
Internal Medicine

## 2024-03-25 NOTE — CHART NOTE - NSCHARTNOTEFT_GEN_A_CORE
Patient was noted to have orange-red possibly hematuria looking urine collecting in the canister (pt is currently on primafit). Given pt's history of cancer and anemia, CTAP was done to rule out any source of GI/ bleeding at this time. CT AP w/ contrast showed: no contrast extravasation to suggest site of active bleed. Cystitis with probable ascending urinary tract infection, right greater than left. Moderate right hydroureteronephrosis and mild left hydroureteronephrosis to the level of the urinary bladder. Finding was discussed with Dr. Garcai and Urology was called for further recommendation. It was also noted that pt received 2 days of Pyridium which can change the urine color.     Plan:   - [ ] F/u bladder scan - voiding well but will check PVR   - [ ] F/u urology rec   - [ ] F/u repeat UA/UCx - will likely be negative given recent treatment for urinary tract infection but will monitor
Discussed repeat US KB results and patient symptoms with urology. Recommend outpatient follow up. Discussed with patient and  at bedside.    Stephany Ken PA-C  Department of Internal Medicine  pager [phone], available on [employer] TEAMS

## 2024-03-25 NOTE — PROGRESS NOTE ADULT - ASSESSMENT
This is a 73 year old female with right breast cancer who presents with generalized weakness, dysuria, and diarrhea.    1. 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 Easton Harris of [facility] after discharge     2. Pancytopenia   -- Suspect due to underlying malignancy and recent chemotherapy   -- Labs w/o evidence of nutritional deficiencies and hemolysis   -- Monitor CBC and transfuse to maintain hg >7. Counts overall improving.    3. Dysuria   -- Urine culture negative though pt still c/o dysuria  -- Cont ceftriaxone for now     4. Diarrhea   -- From [facility]- C diff and GI PCR neg   -- Per pt no diarrhea for the past several days     Will continue to follow.    MINERVA WisemanC  Hematology/Oncology  [facility]  [phone] (office)  [phone] (alt office)  Evenings and weekends please call MD on call or office  
This is a 73 year old female with right breast cancer who presents with generalized weakness, dysuria, and diarrhea.    1. 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 Easton Harris of [facility] after discharge     2. Pancytopenia   -- Suspect due to underlying malignancy and recent chemotherapy   -- Labs w/o evidence of nutritional deficiencies and hemolysis   -- Monitor CBC and transfuse to maintain hg >7. Counts overall improving though hemoglobin lower today. Plan for 1 unit pRBC transfusion prior to discharge.     3. Dysuria   -- Urine culture negative though pt still c/o dysuria  -- s/p empiric antibiotics   -- Cont pyridium     4. Diarrhea   -- From [facility]- C diff and GI PCR neg   -- Per pt diarrhea resolved as of a few days ago    Will continue to follow.    Ofe Herrera PA-C  Hematology/Oncology  [facility]  [phone] (office)  [phone] (alt office)  Evenings and weekends please call MD on call or office  
73y Female with history of breast CA presents with diarrhea. Nephrology consulted for hypokalemia.    1) Hypokalemia: likely due to hypomagnesemia in setting of PPI use and exacerbated by diarrhea. Hypokalemia resolved. Monitor serum potassium.    2) Hypomagnesemia: Due to PPI? Will consider discontinuation of PPI versus standing Mg supplementation if persistent. Monitor serum magnesium.    3) Hypocalcemia: likely due to diluted sample. No further work up needed. Monitor serum calcium.    4) Metabolic acidosis: Due to diarrhea? Improving on LR. Monitor pH.    5) TATY: likely due to hypovolemia? Continue with IVF for another 24 hours.      [facility] NEPHROLOGY  Buddy Caldwell M.D.  Iban Navas D.O.  Shaolnda Sosa M.D.  MD Leti Jain, MSN, ANP-C    Telephone: [phone]  Facsimile: [phone]    [address] [place] [address], [address]  [place] [address]  
73y Female with history of breast CA presents with diarrhea. Nephrology consulted for hypokalemia.    1) Hypokalemia: likely due to hypomagnesemia in setting of PPI use and exacerbated by diarrhea? Hypokalemia resolved.  Monitor serum potassium.    2) Hypomagnesemia: Due to PPI? Pt given PO MgOx x1 today. Will consider discontinuation of PPI versus standing Mg supplementation if persistent. Monitor serum magnesium.    3) Hypocalcemia: corrected Ca wnl.  Monitor ionized calcium.    4) Metabolic acidosis: Due to diarrhea improved with LR IVF. Monitor pH.    5) Gross hematuria: CT abd/pelvis with IV contrast 3/22 with mod rt and mild left hydroureteronephrosis; Urology following; re-image as per Urology to monitor for resolution      [facility] NEPHROLOGY  Buddy Caldwell M.D.  Iban Navas D.O.  Shalonda Sosa M.D.  MD Leti Jain, MSN, ANP-C    Telephone: [phone]  Facsimile: [phone] [id number] [place] [address], [address]  [place] [address]  
This is a 73 year old female with right breast cancer who presents with generalized weakness, dysuria, and diarrhea.    1. 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 Easton Harris of [facility] after discharge     2. Pancytopenia   -- Suspect due to underlying malignancy and recent chemotherapy   -- Labs w/o evidence of nutritional deficiencies and hemolysis   -- Monitor CBC and transfuse to maintain hg >7. Counts overall improving.    3. Dysuria   -- Urine culture negative though pt still c/o dysuria  -- s/p empiric antibiotics   -- Cont pyridium     4. Diarrhea   -- From [facility]- C diff and GI PCR neg   -- Per pt diarrhea resolved as of a few days ago    Will continue to follow.    Ofe Herrera PA-C  Hematology/Oncology  [facility]  [phone] (office)  [phone] (alt office)  Evenings and weekends please call MD on call or office  
This is a 73 year old female with right breast cancer who presents with generalized weakness, dysuria, and diarrhea.    1. 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 Easton Harris of [facility] after discharge     2. Pancytopenia   -- Suspect due to underlying malignancy and recent chemotherapy   -- Labs w/o evidence of nutritional deficiencies and hemolysis   -- Monitor CBC and transfuse to maintain hg >7. Counts overall improving although had a decline in her hemoglobin on 3/22 with some dark urine (likely pyridium). s/p 1 unit PRBC on 3/22 with improvement to 11.1.   -- CTA A/P done without bleed however cystitis seen with ascending UTI and b/l hydronephrosis- urology consult appreciated- observe and RBUS in 72 hours- if any sign of infection/flank pain/TATY will consider stent     3. Dysuria   -- Urine culture negative though pt still c/o dysuria  -- s/p empiric antibiotics   -- Cont pyridium   -- CTA A/P as above- appreciate urology recs as above     4. Diarrhea   -- From [facility]- C diff and GI PCR neg   -- Per pt diarrhea resolved     Will continue to follow.    Rony Cavazos MD   Hematology/Oncology  [facility]  [phone] (office)  [phone] (alt office)  
72 yo f with weakness in setting of recurrent anemia suspected UTI and diarrhea 
73y Female with history of breast CA presents with diarrhea. Nephrology consulted for hypokalemia.    1) Hypokalemia: likely due to hypomagnesemia in setting of PPI use and exacerbated by diarrhea. Labs this morning erroneous. Follow up repeat renal panel. Monitor serum potassium.    2) Hypomagnesemia: Due to PPI? Will consider discontinuation of PPI versus standing Mg supplementation if persistent. Monitor serum magnesium.    3) Hypocalcemia: likely due to diluted sample. Follow up repeat renal panel this morning. Monitor serum calcium.    4) Metabolic acidosis: Due to diarrhea? Improving on LR. Can discontinue IVF. Monitor pH.    5) TATY: likely due to hypovolemia? Follow up renal panel this morning. Can discontinue IVF.      [facility] NEPHROLOGY  Buddy Caldwell M.D.  Iban Navas D.O.  Shalonda Sosa M.D.  MD Leti Jain, MSN, ANP-C    Telephone: [phone]  Facsimile: [phone]    [facility] [place] [address], [address]  [place] [address]  
73y Female with history of breast CA presents with diarrhea. Nephrology consulted for hypokalemia.    1) Hypokalemia: likely due to hypomagnesemia in setting of PPI use. Hypokalemia resolved. Monitor serum potassium.    2) Hypomagnesemia: Due to PPI. D/C PPI and start standing slow magnesium 2 tablets daily. Monitor serum magnesium.    3) Hypocalcemia: Resolved. Monitor ionized calcium.    4) Metabolic acidosis: Due to diarrhea resolved with LR IVF. Monitor pH.    5) Gross hematuria: Renal US with R hydronephrosis and bladder wall thickening concern for CA? Outpatient Urology follow up.      [facility] NEPHROLOGY  Buddy Caldwell M.D.  Iban Navas D.O.  Shalonda Sosa M.D.  MD Leti Jain, MSN, ANP-C    Telephone: [phone]  Facsimile: [phone]    [address] [place] [address], [address]  [place] [address]  
74 yo f with weakness in setting of recurrent anemia suspected UTI and diarrhea 
This is a 73 year old female with right breast cancer who presents with generalized weakness, dysuria, and diarrhea.    1. 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 Easton Harris of [facility] [place] [facility] [place]     2. Pancytopenia   -- Suspect due to underlying malignancy and recent chemotherapy   -- Labs w/o evidence of nutritional deficiencies and hemolysis   -- Monitor CBC and transfuse to maintain hg >7. s/p 2 units pRBC total this admission with improvement in counts, hemoglobin currently stable.   -- CTA A/P done without bleed however cystitis seen with ascending UTI and b/l hydronephrosis- urology consult    3. Dysuria   -- Urine culture negative though pt still c/o external burning  -- s/p empiric antibiotics   -- Cont pyridium   -- CTA A/P as above- appreciate urology recs: cont to monitor symptoms, and if pt develop signs of infection, worsening flank pain, or TATY would re-evaluate and consider stent placement  -- US shows mod R sided hydronephrosis, similar to prior CT; thickened bladder wall, consider cystoscopy- f/u further urology recs     4. Diarrhea   -- From [facility]- C diff and GI PCR neg   -- Per pt diarrhea resolved     Will continue to follow.    Ofe Herrera PA-C  Hematology/Oncology  [facility]  [phone] (office)  [phone] (alt office)  Evenings and weekends please call MD on call or office    
73y Female with history of breast CA presents with diarrhea. Nephrology consulted for hypokalemia.    1) Hypokalemia: likely due to hypomagnesemia in setting of PPI use and exacerbated by diarrhea? Hypokalemia resolved.  Monitor serum potassium.    2) Hypomagnesemia: Due to PPI? Will consider discontinuation of PPI versus standing Mg supplementation if persistent. Monitor serum magnesium.    3) Hypocalcemia: corrected Ca wnl.  Monitor ionized calcium.    4) Metabolic acidosis: Due to diarrhea improved with LR IVFonitor pH.    5) Gross hematuria: CT abd/pelvis with IV contrast 3/22 with mod rt and mild left hydroureteronephrosis; Urology following; reimaging in 72 hrs      [facility] NEPHROLOGY  Buddy Caldwell M.D.  Iban Navas D.O.  Shalonda Sosa M.D.  MD Leti Jain, MSN, ANP-C    Telephone: [phone]  Facsimile: [phone]    [address] [place] [address], [address]  [place] [address]  
73y Female with history of breast CA presents with diarrhea. Nephrology consulted for hypokalemia.    1) Hypokalemia: likely due to hypomagnesemia in setting of PPI use and exacerbated by diarrhea? Labs this morning erroneous. Follow up repeat renal panel. Monitor serum potassium.    2) Hypomagnesemia: Due to PPI? Will consider discontinuation of PPI versus standing Mg supplementation if persistent. Monitor serum magnesium.    3) Hypocalcemia: likely due to diluted sample. Follow up repeat renal panel this morning. Monitor serum calcium.    4) Metabolic acidosis: Due to diarrhea improved s/p LR. Change IVF to LR given ongoing gross hematuria. Monitor pH.    5) Gross hematuria: Pending CT. Consider Urology consult.      [facility] NEPHROLOGY  Buddy Caldwell M.D.  Iban Navas D.O.  Shalonda Sosa M.D.  MD Leti Jain, MSN, ANP-C    Telephone: [phone]  Facsimile: [phone]    [facility] [place] [address], [address]  [place] [address]  
This is a 73 year old female with right breast cancer who presents with generalized weakness, dysuria, and diarrhea.    1. 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 Easton Harris of [facility] after discharge     2. Pancytopenia   -- Suspect due to underlying malignancy and recent chemotherapy   -- Labs w/o evidence of nutritional deficiencies and hemolysis   -- Monitor CBC and transfuse to maintain hg >7. Counts overall improving although had a decline in her hemoglobin on 3/22 with some dark urine (likely pyridium). s/p 1 unit PRBC on 3/22 with improvement to 11.1.   -- CTA A/P done without bleed however cystitis seen with ascending UTI and b/l hydronephrosis- urology consulted     3. Dysuria   -- Urine culture negative though pt still c/o dysuria  -- s/p empiric antibiotics   -- Cont pyridium   -- CTA A/P as above- follow up urology recs and repeat UA/UCx     4. Diarrhea   -- From [facility]- C diff and GI PCR neg   -- Per pt diarrhea resolved     Will continue to follow.    Rony Cavazos MD   Hematology/Oncology  [facility]  [phone] (office)  [phone] (alt office)  
74 yo f with weakness in setting of recurrent anemia suspected UTI and diarrhea 

## 2024-03-25 NOTE — DISCHARGE NOTE NURSING/CASE MANAGEMENT/SOCIAL WORK - NSDCPEFALRISK_GEN_ALL_CORE
For information on Fall & Injury Prevention, visit: [email or web address] OR  [email or web address] OR  https://www.cdc.gov/steadi/patient.html

## 2024-03-26 ENCOUNTER — TRANSCRIPTION ENCOUNTER (OUTPATIENT)
Age: 74
End: 2024-03-26

## 2024-03-28 ENCOUNTER — TRANSCRIPTION ENCOUNTER (OUTPATIENT)
Age: 74
End: 2024-03-28

## 2024-03-30 ENCOUNTER — APPOINTMENT (OUTPATIENT)
Dept: HOME HEALTH SERVICES | Facility: HOME HEALTH | Age: 74
End: 2024-03-30
Payer: MEDICARE

## 2024-03-30 VITALS
HEIGHT: 64 IN | BODY MASS INDEX: 15.19 KG/M2 | SYSTOLIC BLOOD PRESSURE: 120 MMHG | WEIGHT: 89 LBS | HEART RATE: 90 BPM | DIASTOLIC BLOOD PRESSURE: 88 MMHG | OXYGEN SATURATION: 97 % | RESPIRATION RATE: 16 BRPM | TEMPERATURE: 97.4 F

## 2024-03-30 DIAGNOSIS — Z83.3 FAMILY HISTORY OF DIABETES MELLITUS: ICD-10-CM

## 2024-03-30 DIAGNOSIS — C50.911 MALIGNANT NEOPLASM OF UNSPECIFIED SITE OF RIGHT FEMALE BREAST: ICD-10-CM

## 2024-03-30 DIAGNOSIS — Z71.89 OTHER SPECIFIED COUNSELING: ICD-10-CM

## 2024-03-30 DIAGNOSIS — I10 ESSENTIAL (PRIMARY) HYPERTENSION: ICD-10-CM

## 2024-03-30 DIAGNOSIS — Z80.3 FAMILY HISTORY OF MALIGNANT NEOPLASM OF BREAST: ICD-10-CM

## 2024-03-30 DIAGNOSIS — R64 CACHEXIA: ICD-10-CM

## 2024-03-30 DIAGNOSIS — D64.9 ANEMIA, UNSPECIFIED: ICD-10-CM

## 2024-03-30 PROCEDURE — G0506: CPT

## 2024-03-30 PROCEDURE — 99497 ADVNCD CARE PLAN 30 MIN: CPT

## 2024-03-30 PROCEDURE — 99344 HOME/RES VST NEW MOD MDM 60: CPT | Mod: 25

## 2024-03-30 RX ORDER — MULTIVITAMIN
TABLET ORAL
Qty: 30 | Refills: 0 | Status: ACTIVE | COMMUNITY
Start: 1900-01-01 | End: 1900-01-01

## 2024-03-30 RX ORDER — CARVEDILOL 3.12 MG/1
3.12 TABLET, FILM COATED ORAL
Qty: 180 | Refills: 2 | Status: ACTIVE | COMMUNITY
Start: 2024-03-30

## 2024-03-30 RX ORDER — NUT.TX.GLUC.INTOLER,LAC-FR,SOY
LIQUID (ML) ORAL
Qty: 60 | Refills: 3 | Status: ACTIVE | COMMUNITY
Start: 2024-03-30 | End: 1900-01-01

## 2024-03-30 RX ORDER — VIT A/VIT C/VIT E/ZINC/COPPER 4296-226
CAPSULE ORAL
Refills: 0 | Status: COMPLETED | COMMUNITY
End: 2024-03-30

## 2024-03-30 RX ORDER — DILTIAZEM HYDROCHLORIDE 240 MG/1
240 CAPSULE, COATED, EXTENDED RELEASE ORAL
Refills: 0 | Status: COMPLETED | COMMUNITY
End: 2024-03-30

## 2024-03-30 RX ORDER — RAMIPRIL 2.5 MG/1
2.5 CAPSULE ORAL DAILY
Qty: 30 | Refills: 0 | Status: ACTIVE | COMMUNITY
Start: 2024-03-30

## 2024-03-30 RX ORDER — CARVEDILOL 6.25 MG/1
6.25 TABLET, FILM COATED ORAL TWICE DAILY
Qty: 180 | Refills: 3 | Status: COMPLETED | COMMUNITY
Start: 2024-03-30 | End: 2024-03-30

## 2024-03-30 NOTE — PHYSICAL EXAM
[No Acute Distress] : no acute distress [EOMI] : extra ocular movement intact [Normal Sclera/Conjunctiva] : normal sclera/conjunctiva [Normal Outer Ear/Nose] : the ears and nose were normal in appearance [Normal Oropharynx] : the oropharynx was normal [No Respiratory Distress] : no respiratory distress [Clear to Auscultation] : lungs were clear to auscultation bilaterally [No Accessory Muscle Use] : no accessory muscle use [Normal Rate] : heart rate was normal  [Normal S1, S2] : normal S1 and S2 [Regular Rhythm] : with a regular rhythm [No Edema] : there was no peripheral edema [No Murmurs] : no murmurs heard [Non Tender] : non-tender [Normal Bowel Sounds] : normal bowel sounds [Soft] : abdomen soft [Not Distended] : not distended [Normal Anterior Cervical Nodes] : no anterior cervical lymphadenopathy [Normal Post Cervical Nodes] : no posterior cervical lymphadenopathy [No Spinal Tenderness] : no spinal tenderness [No CVA Tenderness] : no ~M costovertebral angle tenderness [Oriented x3] : oriented to person, place, and time [No Rash] : no rash [Normal Affect] : the affect was normal [de-identified] : unsteady gait

## 2024-03-30 NOTE — REVIEW OF SYSTEMS
[Fatigue] : fatigue [Recent Change In Weight] : ~T recent weight change [FreeTextEntry2] : General weakness. Cachectic. [Negative] : Heme/Lymph

## 2024-03-30 NOTE — HEALTH RISK ASSESSMENT
[HRA Reviewed] : Health risk assessment reviewed [Independent] : using telephone [Some assistance needed] : managing medications [Full assistance needed] : using transportation [One fall no injury in past year] : Patient reported one fall in the past year without injury [No] : The patient does not have visual impairment [TimeGetUpGo] : 8

## 2024-03-30 NOTE — HISTORY OF PRESENT ILLNESS
[House Calls Co-Management Patient] : [unfilled] is a House Calls co-management patient [Education provided] : education provided [Patient is stable] : patient is stable [Medications adjusted] : medication adjusted [Patient] : patient [Spouse] : spouse [FreeTextEntry2] : PMH: HTN. R breast cancer. S/p chemotherapy, canceled. Anemia. S/p blood transfusion. Adult failure to thrive. Cachectic. S/p UTI.  The patient lives in neat private house with very supportive spouse. She has two children, daughter and son. Third child son  in 16 y old.  Interval Events: - The patient with R breast cancer started chemo on 2024. After 3 sessions failed: Anemia, UTI, Generalized weakness. Chemo was canceled. The patient is follow up with her oncologist Dr. Harris.   Medical Issues:  - R breast cancer. S/p chemotherapy, canceled. Follow up with oncologist. - HTN: Carvedilol 3.125 mg 1 tab BID, Ramipril 2.5 1 cap daily. - Cachectic/FTT: Multivitamins 1 tab daily. Diet was discussed. Ensure high protein 2 bottles a day.  - Anemia/S/p blood transfusion: Will monitor.      Specialists:  PCP Bernardo Lindquist [phone]. Oncology Dr. Easton Harris. Breast surgeon Dr. Grant Urologist Kristian Almeida.      Social hx: non smoker, non drinker.  Caregivers:  Spouse Sam Gaffney.  Subjective: -Appetite/weight: appetite is better. FTT. -Gait/falls: Gait is unstable. No falls. -Pain: no.  -Sleep: sleeps wells. -BMs: regular no straining. -Urine: no issue. -Skin: intact -DME: rollator. -Mood/memory: mood is good. Short memory is failed. -Communication: conversational, pleasant. minimal medical literacy -Health Maintenance: vaccinated up to date. Colonoscopy no. -Hospitalizations in the past year: 2.  MOLST:  Discussion of advance care planning. Total time spent: 35 min. Participants: patient, RN Molly Syed, NP Aneta Lynch, Discussion: Goals of care, Advanced directives, Health care agency, and Disease trajectory. Completed MOLST form: DNR, DNI, DNH, artificial feeding, determine use of antibiotics, no Dialysis. Please see Advanced Care Planning order. Goal of care: primary focus for patient and family is comfort, patient would like to stay home for the days that remain for pt, also understanding some of the limitations to what we can supply at home in order for pt to meet the need to be comfortable. HCA: Spouse Sam Gaffney is a Proxy. Disease Trajectory: Discussed and reviewed that patient will not likely functionally improve and will likely become more dependent on ADLs over time. Hospice Benefit also discussed as a potential benefit to patient in future once meeting criteria. Prognosis: not fully discussed at this time with patient and family, will discuss at our next visit a time line of prognosis.

## 2024-03-30 NOTE — COUNSELING
[Underweight - ( BMI  <18.5 )] : underweight - ( BMI  <18.5 ) [Mediterranean diet recommended] : Mediterranean diet recommended [Non - Smoker] : non-smoker [Smoke/CO Detectors] : smoke/CO detectors [Use grab bars] : use grab bars [Use assistive device to avoid falls] : use assistive device to avoid falls [Date: ___] : mammogram completed on [unfilled] [] : foot exam [Patient not on disease-modifying anti-rheumatic drug due to overall prognosis] : Patient not on disease-modifying anti-rheumatic drug due to overall prognosis [Improve exercise tolerance] : improve exercise tolerance [FreeTextEntry3] : High protein diet [Improve mobility] : improve mobility [Improve weight] : improve weight [Decrease stress] : decrease stress [Improve pain control] : improve pain control [Minimize unnecessary interventions] : minimize unnecessary interventions [Decrease hospital use] : decrease hospital use [Comfort Care] : comfort care [Maintain functional ability] : maintain functional ability [Discussed disease trajectory with patient/caregiver] : discussed disease trajectory with patient/caregiver [Likely to achieve goals/desired outcomes] : likely to achieve goals/desired outcomes [Patient/Caregiver has ___ understanding of disease process] : patient/caregiver has [unfilled] understanding of disease process [Advanced Directives discussed: ____] : Advanced directives discussed: [unfilled] [Completed Healthcare Proxy] : completed healthcare proxy [No Limitations] : Treatment Guidelines: No limitations [Full Code] : Code Status: Full Code [[facility] Date: _____] : [facility]last verification date: [unfilled] [Long Term Intubation] : Intubation: Long term intubation [_____] : HCP: [unfilled] [ - New patient with 2 or more chronic conditions; CCM discussed and patient-centered care plan established] : New patient with 2 or more chronic conditions; CCM discussed and patient-centered care plan established

## 2024-03-30 NOTE — REASON FOR VISIT
[Spouse] : spouse [Initial Evaluation] : an initial evaluation [Pre-Visit Preparation] : pre-visit preparation was done

## 2024-04-04 ENCOUNTER — APPOINTMENT (OUTPATIENT)
Dept: HOME HEALTH SERVICES | Facility: HOME HEALTH | Age: 74
End: 2024-04-04

## 2024-04-08 ENCOUNTER — APPOINTMENT (OUTPATIENT)
Dept: HOME HEALTH SERVICES | Facility: HOME HEALTH | Age: 74
End: 2024-04-08

## 2024-07-29 ENCOUNTER — APPOINTMENT (OUTPATIENT)
Dept: HOME HEALTH SERVICES | Facility: HOME HEALTH | Age: 74
End: 2024-07-29

## 2024-07-29 VITALS
RESPIRATION RATE: 16 BRPM | OXYGEN SATURATION: 99 % | SYSTOLIC BLOOD PRESSURE: 118 MMHG | HEART RATE: 82 BPM | DIASTOLIC BLOOD PRESSURE: 78 MMHG | TEMPERATURE: 97.1 F

## 2024-07-29 RX ORDER — CARVEDILOL 3.12 MG/1
3.12 TABLET, FILM COATED ORAL
Qty: 180 | Refills: 2 | Status: ACTIVE | COMMUNITY

## 2024-07-29 RX ORDER — MULTIVITAMIN
TABLET ORAL
Refills: 0 | Status: ACTIVE | COMMUNITY

## 2024-12-02 ENCOUNTER — NON-APPOINTMENT (OUTPATIENT)
Age: 74
End: 2024-12-02

## 2025-01-13 ENCOUNTER — APPOINTMENT (OUTPATIENT)
Dept: HOME HEALTH SERVICES | Facility: HOME HEALTH | Age: 75
End: 2025-01-13
Payer: MEDICARE

## 2025-01-13 VITALS — WEIGHT: 115 LBS | BODY MASS INDEX: 19.63 KG/M2 | HEIGHT: 64 IN

## 2025-01-13 DIAGNOSIS — I10 ESSENTIAL (PRIMARY) HYPERTENSION: ICD-10-CM

## 2025-01-13 DIAGNOSIS — C50.911 MALIGNANT NEOPLASM OF UNSPECIFIED SITE OF RIGHT FEMALE BREAST: ICD-10-CM

## 2025-01-13 DIAGNOSIS — Z71.89 OTHER SPECIFIED COUNSELING: ICD-10-CM

## 2025-01-13 DIAGNOSIS — R64 CACHEXIA: ICD-10-CM

## 2025-01-13 PROCEDURE — 99348 HOME/RES VST EST LOW MDM 30: CPT

## 2025-08-05 ENCOUNTER — NON-APPOINTMENT (OUTPATIENT)
Age: 75
End: 2025-08-05

## 2025-08-27 ENCOUNTER — NON-APPOINTMENT (OUTPATIENT)
Age: 75
End: 2025-08-27

## 2025-09-08 ENCOUNTER — NON-APPOINTMENT (OUTPATIENT)
Age: 75
End: 2025-09-08